# Patient Record
Sex: FEMALE | Race: WHITE | NOT HISPANIC OR LATINO | URBAN - METROPOLITAN AREA
[De-identification: names, ages, dates, MRNs, and addresses within clinical notes are randomized per-mention and may not be internally consistent; named-entity substitution may affect disease eponyms.]

---

## 2024-09-04 ENCOUNTER — INPATIENT (INPATIENT)
Facility: HOSPITAL | Age: 79
LOS: 13 days | Discharge: HOME | DRG: 511 | End: 2024-09-18
Attending: OBSTETRICS & GYNECOLOGY
Payer: MEDICAID

## 2024-09-04 VITALS
TEMPERATURE: 97 F | SYSTOLIC BLOOD PRESSURE: 174 MMHG | RESPIRATION RATE: 16 BRPM | OXYGEN SATURATION: 99 % | DIASTOLIC BLOOD PRESSURE: 83 MMHG | HEART RATE: 68 BPM | WEIGHT: 138.89 LBS

## 2024-09-04 DIAGNOSIS — R09.89 OTHER SPECIFIED SYMPTOMS AND SIGNS INVOLVING THE CIRCULATORY AND RESPIRATORY SYSTEMS: ICD-10-CM

## 2024-09-04 DIAGNOSIS — R19.00 INTRA-ABDOMINAL AND PELVIC SWELLING, MASS AND LUMP, UNSPECIFIED SITE: ICD-10-CM

## 2024-09-04 LAB
ALBUMIN SERPL ELPH-MCNC: 3.3 G/DL — LOW (ref 3.5–5.2)
ALP SERPL-CCNC: 71 U/L — SIGNIFICANT CHANGE UP (ref 30–115)
ALT FLD-CCNC: 7 U/L — SIGNIFICANT CHANGE UP (ref 0–41)
ANION GAP SERPL CALC-SCNC: 12 MMOL/L — SIGNIFICANT CHANGE UP (ref 7–14)
ANISOCYTOSIS BLD QL: SLIGHT — SIGNIFICANT CHANGE UP
APTT BLD: 33.1 SEC — SIGNIFICANT CHANGE UP (ref 27–39.2)
AST SERPL-CCNC: 23 U/L — SIGNIFICANT CHANGE UP (ref 0–41)
BASOPHILS # BLD AUTO: 0 K/UL — SIGNIFICANT CHANGE UP (ref 0–0.2)
BASOPHILS NFR BLD AUTO: 0 % — SIGNIFICANT CHANGE UP (ref 0–1)
BILIRUB SERPL-MCNC: 0.4 MG/DL — SIGNIFICANT CHANGE UP (ref 0.2–1.2)
BLD GP AB SCN SERPL QL: SIGNIFICANT CHANGE UP
BUN SERPL-MCNC: 15 MG/DL — SIGNIFICANT CHANGE UP (ref 10–20)
CALCIUM SERPL-MCNC: 8.8 MG/DL — SIGNIFICANT CHANGE UP (ref 8.4–10.5)
CHLORIDE SERPL-SCNC: 92 MMOL/L — LOW (ref 98–110)
CO2 SERPL-SCNC: 26 MMOL/L — SIGNIFICANT CHANGE UP (ref 17–32)
CREAT SERPL-MCNC: 1.1 MG/DL — SIGNIFICANT CHANGE UP (ref 0.7–1.5)
EGFR: 52 ML/MIN/1.73M2 — LOW
EOSINOPHIL # BLD AUTO: 0 K/UL — SIGNIFICANT CHANGE UP (ref 0–0.7)
EOSINOPHIL NFR BLD AUTO: 0 % — SIGNIFICANT CHANGE UP (ref 0–8)
GLUCOSE SERPL-MCNC: 127 MG/DL — HIGH (ref 70–99)
HCT VFR BLD CALC: 31.2 % — LOW (ref 37–47)
HGB BLD-MCNC: 10.2 G/DL — LOW (ref 12–16)
INR BLD: 1.13 RATIO — SIGNIFICANT CHANGE UP (ref 0.65–1.3)
LYMPHOCYTES # BLD AUTO: 0.09 K/UL — LOW (ref 1.2–3.4)
LYMPHOCYTES # BLD AUTO: 1.9 % — LOW (ref 20.5–51.1)
MANUAL SMEAR VERIFICATION: SIGNIFICANT CHANGE UP
MCHC RBC-ENTMCNC: 28.1 PG — SIGNIFICANT CHANGE UP (ref 27–31)
MCHC RBC-ENTMCNC: 32.7 G/DL — SIGNIFICANT CHANGE UP (ref 32–37)
MCV RBC AUTO: 86 FL — SIGNIFICANT CHANGE UP (ref 81–99)
MONOCYTES # BLD AUTO: 0.31 K/UL — SIGNIFICANT CHANGE UP (ref 0.1–0.6)
MONOCYTES NFR BLD AUTO: 6.8 % — SIGNIFICANT CHANGE UP (ref 1.7–9.3)
MYELOCYTES NFR BLD: 1 % — HIGH (ref 0–0)
NEUTROPHILS # BLD AUTO: 4.13 K/UL — SIGNIFICANT CHANGE UP (ref 1.4–6.5)
NEUTROPHILS NFR BLD AUTO: 90.3 % — HIGH (ref 42.2–75.2)
OVALOCYTES BLD QL SMEAR: SLIGHT — SIGNIFICANT CHANGE UP
PLAT MORPH BLD: NORMAL — SIGNIFICANT CHANGE UP
PLATELET # BLD AUTO: 241 K/UL — SIGNIFICANT CHANGE UP (ref 130–400)
PMV BLD: 10.4 FL — SIGNIFICANT CHANGE UP (ref 7.4–10.4)
POIKILOCYTOSIS BLD QL AUTO: SLIGHT — SIGNIFICANT CHANGE UP
POLYCHROMASIA BLD QL SMEAR: SIGNIFICANT CHANGE UP
POTASSIUM SERPL-MCNC: 5.2 MMOL/L — HIGH (ref 3.5–5)
POTASSIUM SERPL-SCNC: 5.2 MMOL/L — HIGH (ref 3.5–5)
PROT SERPL-MCNC: 6.5 G/DL — SIGNIFICANT CHANGE UP (ref 6–8)
PROTHROM AB SERPL-ACNC: 12.9 SEC — HIGH (ref 9.95–12.87)
RBC # BLD: 3.63 M/UL — LOW (ref 4.2–5.4)
RBC # FLD: 17.8 % — HIGH (ref 11.5–14.5)
RBC BLD AUTO: ABNORMAL
SMUDGE CELLS # BLD: PRESENT — SIGNIFICANT CHANGE UP
SODIUM SERPL-SCNC: 130 MMOL/L — LOW (ref 135–146)
SPHEROCYTES BLD QL SMEAR: SLIGHT — SIGNIFICANT CHANGE UP
WBC # BLD: 4.57 K/UL — LOW (ref 4.8–10.8)
WBC # FLD AUTO: 4.57 K/UL — LOW (ref 4.8–10.8)

## 2024-09-04 PROCEDURE — 82670 ASSAY OF TOTAL ESTRADIOL: CPT

## 2024-09-04 PROCEDURE — P9047: CPT

## 2024-09-04 PROCEDURE — C1889: CPT

## 2024-09-04 PROCEDURE — P9016: CPT

## 2024-09-04 PROCEDURE — 74018 RADEX ABDOMEN 1 VIEW: CPT

## 2024-09-04 PROCEDURE — 85379 FIBRIN DEGRADATION QUANT: CPT

## 2024-09-04 PROCEDURE — 97110 THERAPEUTIC EXERCISES: CPT | Mod: GP

## 2024-09-04 PROCEDURE — 85014 HEMATOCRIT: CPT

## 2024-09-04 PROCEDURE — 80053 COMPREHEN METABOLIC PANEL: CPT

## 2024-09-04 PROCEDURE — 83735 ASSAY OF MAGNESIUM: CPT

## 2024-09-04 PROCEDURE — 82746 ASSAY OF FOLIC ACID SERUM: CPT

## 2024-09-04 PROCEDURE — 86901 BLOOD TYPING SEROLOGIC RH(D): CPT

## 2024-09-04 PROCEDURE — 80048 BASIC METABOLIC PNL TOTAL CA: CPT

## 2024-09-04 PROCEDURE — 82330 ASSAY OF CALCIUM: CPT

## 2024-09-04 PROCEDURE — 82728 ASSAY OF FERRITIN: CPT

## 2024-09-04 PROCEDURE — 85018 HEMOGLOBIN: CPT

## 2024-09-04 PROCEDURE — 94003 VENT MGMT INPAT SUBQ DAY: CPT

## 2024-09-04 PROCEDURE — 94760 N-INVAS EAR/PLS OXIMETRY 1: CPT

## 2024-09-04 PROCEDURE — 93970 EXTREMITY STUDY: CPT

## 2024-09-04 PROCEDURE — 83036 HEMOGLOBIN GLYCOSYLATED A1C: CPT

## 2024-09-04 PROCEDURE — 76830 TRANSVAGINAL US NON-OB: CPT

## 2024-09-04 PROCEDURE — 85025 COMPLETE CBC W/AUTO DIFF WBC: CPT

## 2024-09-04 PROCEDURE — 88360 TUMOR IMMUNOHISTOCHEM/MANUAL: CPT

## 2024-09-04 PROCEDURE — 84295 ASSAY OF SERUM SODIUM: CPT

## 2024-09-04 PROCEDURE — 86336 INHIBIN A: CPT

## 2024-09-04 PROCEDURE — 85730 THROMBOPLASTIN TIME PARTIAL: CPT

## 2024-09-04 PROCEDURE — 97162 PT EVAL MOD COMPLEX 30 MIN: CPT | Mod: GP

## 2024-09-04 PROCEDURE — 97116 GAIT TRAINING THERAPY: CPT | Mod: GP

## 2024-09-04 PROCEDURE — C1769: CPT

## 2024-09-04 PROCEDURE — 83615 LACTATE (LD) (LDH) ENZYME: CPT

## 2024-09-04 PROCEDURE — 83540 ASSAY OF IRON: CPT

## 2024-09-04 PROCEDURE — 36415 COLL VENOUS BLD VENIPUNCTURE: CPT

## 2024-09-04 PROCEDURE — 71045 X-RAY EXAM CHEST 1 VIEW: CPT

## 2024-09-04 PROCEDURE — 86923 COMPATIBILITY TEST ELECTRIC: CPT

## 2024-09-04 PROCEDURE — 83010 ASSAY OF HAPTOGLOBIN QUANT: CPT

## 2024-09-04 PROCEDURE — 86301 IMMUNOASSAY TUMOR CA 19-9: CPT

## 2024-09-04 PROCEDURE — 74177 CT ABD & PELVIS W/CONTRAST: CPT | Mod: 26,MC

## 2024-09-04 PROCEDURE — P9045: CPT

## 2024-09-04 PROCEDURE — 83605 ASSAY OF LACTIC ACID: CPT

## 2024-09-04 PROCEDURE — 93005 ELECTROCARDIOGRAM TRACING: CPT

## 2024-09-04 PROCEDURE — 36430 TRANSFUSION BLD/BLD COMPNT: CPT

## 2024-09-04 PROCEDURE — 83520 IMMUNOASSAY QUANT NOS NONAB: CPT

## 2024-09-04 PROCEDURE — 83550 IRON BINDING TEST: CPT

## 2024-09-04 PROCEDURE — 85045 AUTOMATED RETICULOCYTE COUNT: CPT

## 2024-09-04 PROCEDURE — 82607 VITAMIN B-12: CPT

## 2024-09-04 PROCEDURE — C1880: CPT

## 2024-09-04 PROCEDURE — 84132 ASSAY OF SERUM POTASSIUM: CPT

## 2024-09-04 PROCEDURE — 82962 GLUCOSE BLOOD TEST: CPT

## 2024-09-04 PROCEDURE — 94002 VENT MGMT INPAT INIT DAY: CPT

## 2024-09-04 PROCEDURE — 82378 CARCINOEMBRYONIC ANTIGEN: CPT

## 2024-09-04 PROCEDURE — 37191 INS ENDOVAS VENA CAVA FILTR: CPT

## 2024-09-04 PROCEDURE — 93306 TTE W/DOPPLER COMPLETE: CPT

## 2024-09-04 PROCEDURE — 88305 TISSUE EXAM BY PATHOLOGIST: CPT

## 2024-09-04 PROCEDURE — 86304 IMMUNOASSAY TUMOR CA 125: CPT

## 2024-09-04 PROCEDURE — 86900 BLOOD TYPING SEROLOGIC ABO: CPT

## 2024-09-04 PROCEDURE — 88341 IMHCHEM/IMCYTCHM EA ADD ANTB: CPT

## 2024-09-04 PROCEDURE — 99285 EMERGENCY DEPT VISIT HI MDM: CPT

## 2024-09-04 PROCEDURE — 88307 TISSUE EXAM BY PATHOLOGIST: CPT

## 2024-09-04 PROCEDURE — 82803 BLOOD GASES ANY COMBINATION: CPT

## 2024-09-04 PROCEDURE — 71260 CT THORAX DX C+: CPT | Mod: 26,MC

## 2024-09-04 PROCEDURE — C1751: CPT

## 2024-09-04 PROCEDURE — 97530 THERAPEUTIC ACTIVITIES: CPT | Mod: GP

## 2024-09-04 PROCEDURE — 85027 COMPLETE CBC AUTOMATED: CPT

## 2024-09-04 PROCEDURE — 85610 PROTHROMBIN TIME: CPT

## 2024-09-04 PROCEDURE — 88304 TISSUE EXAM BY PATHOLOGIST: CPT

## 2024-09-04 PROCEDURE — 88342 IMHCHEM/IMCYTCHM 1ST ANTB: CPT

## 2024-09-04 PROCEDURE — 71045 X-RAY EXAM CHEST 1 VIEW: CPT | Mod: 26

## 2024-09-04 PROCEDURE — 86850 RBC ANTIBODY SCREEN: CPT

## 2024-09-04 PROCEDURE — 84100 ASSAY OF PHOSPHORUS: CPT

## 2024-09-04 PROCEDURE — 93970 EXTREMITY STUDY: CPT | Mod: 26

## 2024-09-04 RX ORDER — HEPARIN SODIUM,BOVINE 1000/ML
5000 VIAL (ML) INJECTION EVERY 6 HOURS
Refills: 0 | Status: DISCONTINUED | OUTPATIENT
Start: 2024-09-04 | End: 2024-09-11

## 2024-09-04 RX ORDER — SENNA 187 MG
2 TABLET ORAL AT BEDTIME
Refills: 0 | Status: DISCONTINUED | OUTPATIENT
Start: 2024-09-04 | End: 2024-09-11

## 2024-09-04 RX ORDER — HEPARIN SODIUM,BOVINE 1000/ML
2500 VIAL (ML) INJECTION EVERY 6 HOURS
Refills: 0 | Status: DISCONTINUED | OUTPATIENT
Start: 2024-09-04 | End: 2024-09-11

## 2024-09-04 RX ORDER — OXYCODONE AND ACETAMINOPHEN 7.5; 325 MG/1; MG/1
1 TABLET ORAL EVERY 6 HOURS
Refills: 0 | Status: DISCONTINUED | OUTPATIENT
Start: 2024-09-04 | End: 2024-09-11

## 2024-09-04 RX ORDER — ACETAMINOPHEN 325 MG/1
650 TABLET ORAL EVERY 8 HOURS
Refills: 0 | Status: DISCONTINUED | OUTPATIENT
Start: 2024-09-04 | End: 2024-09-12

## 2024-09-04 RX ORDER — POLYETHYLENE GLYCOL 3350 17 G/17G
17 POWDER, FOR SOLUTION ORAL
Refills: 0 | Status: DISCONTINUED | OUTPATIENT
Start: 2024-09-04 | End: 2024-09-11

## 2024-09-04 RX ORDER — AMLODIPINE BESYLATE 10 MG/1
5 TABLET ORAL ONCE
Refills: 0 | Status: COMPLETED | OUTPATIENT
Start: 2024-09-04 | End: 2024-09-04

## 2024-09-04 RX ORDER — IOHEXOL 350 MG/ML
30 INJECTION, SOLUTION INTRAVENOUS ONCE
Refills: 0 | Status: COMPLETED | OUTPATIENT
Start: 2024-09-04 | End: 2024-09-04

## 2024-09-04 RX ORDER — HEPARIN SODIUM,BOVINE 1000/ML
VIAL (ML) INJECTION
Qty: 25000 | Refills: 0 | Status: DISCONTINUED | OUTPATIENT
Start: 2024-09-04 | End: 2024-09-10

## 2024-09-04 RX ORDER — HEPARIN SODIUM,BOVINE 1000/ML
5000 VIAL (ML) INJECTION ONCE
Refills: 0 | Status: COMPLETED | OUTPATIENT
Start: 2024-09-04 | End: 2024-09-04

## 2024-09-04 RX ADMIN — AMLODIPINE BESYLATE 5 MILLIGRAM(S): 10 TABLET ORAL at 22:59

## 2024-09-04 RX ADMIN — ACETAMINOPHEN 650 MILLIGRAM(S): 325 TABLET ORAL at 22:59

## 2024-09-04 RX ADMIN — ACETAMINOPHEN 650 MILLIGRAM(S): 325 TABLET ORAL at 23:45

## 2024-09-04 RX ADMIN — Medication 1100 UNIT(S)/HR: at 22:58

## 2024-09-04 RX ADMIN — IOHEXOL 30 MILLILITER(S): 350 INJECTION, SOLUTION INTRAVENOUS at 12:32

## 2024-09-04 RX ADMIN — Medication 2 TABLET(S): at 22:59

## 2024-09-04 RX ADMIN — Medication 5000 UNIT(S): at 22:50

## 2024-09-04 NOTE — H&P ADULT - NSHPPHYSICALEXAM_GEN_ALL_CORE
CONST: Well appearing in NAD  	EYES: PERRL, EOMI, Sclera and conjunctiva clear.   	ENT: Oropharynx normal appearing, no erythema or exudates. Uvula midline.  	CARD: Normal S1 S2; Normal rate and rhythm  	RESP: Equal BS B/L, No wheezes, rhonchi or rales. No distress  	GI: Soft, non-tender, moderately protuberant abdomen.   	MS: Normal ROM in all extremities. No midline spinal tenderness. FROM R hip passive, pain elicited upon R SLR. Mild swelling bilateral lower extremities 1+  	SKIN: Warm, dry, no acute rashes. Good turgor  NEURO: A&Ox3, No focal deficits. Strength 5/5 with no sensory deficits. CONST: lying comfortably   CARD: Normal S1 S2; Normal rate and rhythm  RESP: clear b/l   GI:distented hard with a central surgical scar faizan   MS: No swelling   NEURO: A&Ox3, No focal deficits. Strength 5/5 with no sensory deficits.

## 2024-09-04 NOTE — ED PROVIDER NOTE - FAMILY DETAILS FREE TEXT FOR MDM ADDL HISTORY OBTAINED FROM QUESTION
Chief Complaint   Patient presents with     Follow Up For     Return CORE; 74 year old with chronic diastolic heart failure and recent hospitalization presents for follow-up with labs prior.      Medications reviewed and vitals performed.  Ai Norton CMA    -son at bedside.

## 2024-09-04 NOTE — ED PROVIDER NOTE - CLINICAL SUMMARY MEDICAL DECISION MAKING FREE TEXT BOX
Patient signed out to me from Dr. Ellis -79-year-old female recently diagnosed with metastatic cancer in [facility], unable to get chemo there.  Sent to the ER from [facility]D, Dr. Jefferson for further workup and admission for likely biopsy.  Labs/CT reviewed.  Prelim LE duplex: R DVT in peroneal and gastrocnemius veins, L DVT gastrocnemius, posterior tibial, peroneal veins.  MAR aware, vascular to see patient, anticoagulation to be ordered by inpatient team.

## 2024-09-04 NOTE — H&P ADULT - ASSESSMENT
79-year-old female with past medical history of prediabetes presents for right groin pain with radiation down anterior leg and constipation x 2 days.  Patient has arrived  from [facility] 1 week ago and has been staying with family who have noticed decreased appetite and complaints of right groin pain worsening when walking and decreased bowel movements despite utilizing prune juice and increasing fluids. Tylenol occasionally improves pain.   Patient was seen by her primary Dr Jefferson  last night and directed to the emergency department.  Patient's daughter Queta notes patient underwent a recent surgery to her right colon and [facility], unsure what was done or what was removed but is concerned that her constipation and new pain could be related to the surgery   Denies any  fevers chills nausea vomiting diarrhea no numbness/weakness      #bilateral DVTs :   -As per Ed the prelim Duplex BLE report showed  DVTs from gastroc/popliteal/peroneal.   Not including femoral  PLAN   -f/u duplex BLE official read   -f/u vascular c/s  -started on heparin full AC       #METASTATIC CANCER :   #Large multiloculated cystic mass with areas of soft tissue:  #Constipation   #Dec Apetite   -In Ed systolic  ,afebrile on RA    -neutrophil % 90.3 ,potassium 5.2 hemo lysed   - Dr Jefferson, reviewed records from [facility], pt was found to have metastatic cancer,   possibly colon as primary. Was informed needed to start chemo, however there was no chemo available there at the time.  -On  CT A/P :   Large multiloculated cystic mass with areas of soft tissue, measuring   24.7 x 12.5 x 22.3 cm, extending from the pelvis into the upper abdomen.    Questionable filling defects in both the right and left common femoral   vein and deep venous thrombosis is not excluded.      Indeterminate multiple fluid-filled/cystic lesions within the abdomen   measuring up to 6.5 cm, felt to likely be related to cystic metastatic   disease    Soft tissue density within the transverse colon adjacent to the splenic   flexure on image 52 series 2. Findings may be related to focal stool.       Subcutaneous nodule measuring 1.8 cm and subcentimeter sclerotic focus in   T12, metastatic disease is not excluded  PLAN :   -pain control  -EKG for QTc   -Zofran PRN for vomiting   - Calorie count   - Bowel regimen   -monitor bowel movement   - f/u labs in am   - can follow up Oncologist outpt with plan of chemo   -f/u PT eval     #HYPONATREMIA :   -most likely due to dec PO intake   -Na 130  PLAN   -monitor BMP for sodium         #Mild Hydronephrosis   -creat 1.1 bsl unknown , eGFR 52 %   -seen on CTA/P   PLAN   -f/u BMP to monitor Cr   -bladder scan for rentention   -can consider urology c/s if condition worsen     #Small Pleural effusion  -seen on CT A/P  -CXray no significant finding   -on RA   PLAN   -f/u repeat Cxray in am     #AORTIC ANEURYSM  4.5 cm   -systolic blood pressure was 170 in Ed   -monitor Blood pressure , c/w home meds    #Prediabetics    MISC:  GI ppx: not indicated   DVT ppx: on heparin   Diet: DASH   Activity:ATT   code status : full             79-year-old female with past medical history of prediabetes presents for right groin pain with radiation down anterior leg and constipation x 2 days.  Patient has arrived  from [facility] 1 week ago and has been staying with family who have noticed decreased appetite and complaints of right groin pain worsening when walking and decreased bowel movements despite utilizing prune juice and increasing fluids. Tylenol occasionally improves pain.   Patient was seen by her primary Dr Jefferson  last night and directed to the emergency department.  Patient's daughter Queta notes patient underwent a recent surgery to her right colon and [facility], unsure what was done or what was removed but is concerned that her constipation and new pain could be related to the surgery   Denies any  fevers chills nausea vomiting diarrhea no numbness/weakness      #bilateral DVTs :   -As per Ed the prelim Duplex BLE report showed  DVTs from gastroc/popliteal/peroneal.   Not including femoral  PLAN   -f/u duplex BLE official read   -f/u vascular c/s  -started on heparin full AC       #METASTATIC CANCER :   #Large multiloculated cystic mass with areas of soft tissue:  #Constipation   #Dec Apetite   -In Ed systolic  ,afebrile on RA    -neutrophil % 90.3 ,potassium 5.2 hemo lysed   - Dr Jefferson, reviewed records from [facility], pt was found to have metastatic cancer,   possibly colon as primary. Was informed needed to start chemo, however there was no chemo available there at the time.  -On  CT A/P :   Large multiloculated cystic mass with areas of soft tissue, measuring   24.7 x 12.5 x 22.3 cm, extending from the pelvis into the upper abdomen.    Questionable filling defects in both the right and left common femoral   vein and deep venous thrombosis is not excluded.      Indeterminate multiple fluid-filled/cystic lesions within the abdomen   measuring up to 6.5 cm, felt to likely be related to cystic metastatic   disease    Soft tissue density within the transverse colon adjacent to the splenic   flexure on image 52 series 2. Findings may be related to focal stool.       Subcutaneous nodule measuring 1.8 cm and subcentimeter sclerotic focus in   T12, metastatic disease is not excluded  PLAN :   -pain control  -EKG for QTc   -Zofran PRN for vomiting   - Calorie count   - Bowel regimen   -monitor bowel movement   - f/u labs in am   - can follow up Oncologist outpt with plan of chemo   -f/u PT eval     #HYPONATREMIA :   -most likely due to dec PO intake   -Na 130  PLAN   -monitor BMP for sodium         #Mild Hydronephrosis   -creat 1.1 bsl unknown , eGFR 52 %   -seen on CTA/P   PLAN   -f/u BMP to monitor Cr   -bladder scan for rentention   -can consider urology c/s if condition worsen     #Small Pleural effusion  -seen on CT A/P  -CXray no significant finding   -on RA   PLAN   -f/u repeat Cxray in am     #AORTIC ANEURYSM  4.5 cm   -systolic blood pressure was 170 in Ed   -monitor Blood pressure , c/w home meds after confirming med rec    #Prediabetics    MISC:  GI ppx: not indicated   DVT ppx: on heparin   Diet: DASH   Activity:ATT       called family twice no one picked , please try calling in am to confirm the med rec   pt doesn't knw the list            79-year-old female with past medical history of prediabetes presents for right groin pain with radiation down anterior leg and constipation x 2 days.  Patient has arrived  from [facility] [place] 1 week ago and has been staying with family who have noticed decreased appetite and complaints of right groin pain worsening when walking and decreased bowel movements despite utilizing prune juice and increasing fluids. Tylenol occasionally improves pain.   Patient was seen by her primary Dr Jefferson  last night and directed to the emergency department.  Patient's daughter Queta notes patient underwent a recent surgery to her right colon and [facility] [place], unsure what was done or what was removed but is concerned that her constipation and new pain could be related to the surgery   Denies any  fevers chills nausea vomiting diarrhea no numbness/weakness      #bilateral DVTs :   -As per Ed the prelim Duplex BLE report showed  DVTs from gastroc/popliteal/peroneal.   Not including femoral  PLAN   -f/u duplex BLE official read   -f/u vascular c/s  -started on heparin full AC   -monitor CBC for Hb   -active TnS   -target Hb >7 , transfuse if les than 7       #METASTATIC CANCER :   #Large multiloculated cystic mass with areas of soft tissue:  #Constipation   #Dec Apetite   -In Ed systolic  ,afebrile on RA    -neutrophil % 90.3 ,potassium 5.2 hemo lysed   - Dr Jefferson, reviewed records from [facility] [place], pt was found to have metastatic cancer,   possibly colon as primary. Was informed needed to start chemo, however there was no chemo available there at the time.  -On  CT A/P :   Large multiloculated cystic mass with areas of soft tissue, measuring   24.7 x 12.5 x 22.3 cm, extending from the pelvis into the upper abdomen.    Questionable filling defects in both the right and left common femoral   vein and deep venous thrombosis is not excluded.      Indeterminate multiple fluid-filled/cystic lesions within the abdomen   measuring up to 6.5 cm, felt to likely be related to cystic metastatic   disease    Soft tissue density within the transverse colon adjacent to the splenic   flexure on image 52 series 2. Findings may be related to focal stool.       Subcutaneous nodule measuring 1.8 cm and subcentimeter sclerotic focus in   T12, metastatic disease is not excluded  PLAN :   -pain control  -EKG for QTc   -Zofran PRN for vomiting   - Calorie count   - Bowel regimen   -monitor bowel movement   - f/u labs in am   - can follow up Oncologist outpt with plan of chemo   -f/u PT eval     #HYPONATREMIA :   -most likely due to dec PO intake   -Na 130  PLAN   -monitor BMP for sodium         #Mild Hydronephrosis   -creat 1.1 bsl unknown , eGFR 52 %   -seen on CTA/P   PLAN   -f/u BMP to monitor Cr   -bladder scan for rentention   -can consider urology c/s if condition worsen     #Small Pleural effusion  -seen on CT A/P  -CXray no significant finding   -on RA   PLAN   -f/u repeat Cxray in am     #AORTIC ANEURYSM  4.5 cm   -systolic blood pressure was 170 in Ed   -monitor Blood pressure , c/w home meds after confirming med rec    #Prediabetics    MISC:  GI ppx: not indicated   DVT ppx: on heparin   Diet: DASH   Activity:ATT       called family twice no one picked , please try calling in am to confirm the med rec   pt doesn't knw the list

## 2024-09-04 NOTE — ED PROVIDER NOTE - ATTENDING APP SHARED VISIT CONTRIBUTION OF CARE
79-year-old female with past medical history of prediabetes  pt presents for eval of R groin pain and constipation. pt recently arrived from [facility] 1 wk ago. family notices decreased in appetite and BMs. pt tolerating PO, no nausea/vomiting.  no fevers/chills/urinary complaints. pt saw pcp who advised coming to ED for workup.  no numbness/weakness    vss  gen- NAD, aaox3  card-rrr  lungs-ctab, no wheezing or rhonchi  abd-sntnd, no guarding or rebound  neuro- full str/sensation, cn ii-xii grossly intact, normal coordination and gait

## 2024-09-04 NOTE — ED PROVIDER NOTE - PROGRESS NOTE DETAILS
Discussed with Dr Jefferson, reviewed records from [facility], pt was found to have metastatic cancer, possibly colon as primary. Was informed needed to start chemo, however there was no chemo available there at the time. Pt arrived from [facility] 1 week ago. Concern for possible obstruction. CO- pt endorsed to Dr. Davis- f/u, ct, reassess, dispo Discussed with Dr Remy, large abdominal mass seemingly arising from gyn pathology but cannot rule out colonic pathology. Unsure if compressive on femoral veins, recommends duplex. Ordered lower extremity duplex and endorsed to admitting team for follow up.     Discussed with Dr Jefferson, admit to his service, likely will need biopsy and colonoscopy. Discussed mass with son at bedside. d/w vascular, bilateral DVTs from gastroc/popliteal/peroneal. Not including femoral veins. Discussed this result with admitting resident Dr Ga. d/w vascular, bilateral DVTs from gastroc/popliteal/peroneal. Not including femoral veins Discussed this result with admitting resident Dr Ga, +/- pending anticoagulation/vascular consult.

## 2024-09-04 NOTE — H&P ADULT - HISTORY OF PRESENT ILLNESS
79-year-old female with past medical history of prediabetes presents for right groin pain with radiation down anterior leg and constipation x 2 days.  Patient has arrived  from [facility] 1 week ago and has been staying with family who have noticed decreased appetite and complaints of right groin pain worsening when walking and decreased bowel movements despite utilizing prune juice and increasing fluids. Tylenol occasionally improves pain.   Patient was seen by her primary Dr Jefferson  last night and directed to the emergency department.  Patient's daughter Queta notes patient underwent a recent surgery to her right colon and [facility], unsure what was done or what was removed but is concerned that her constipation and new pain could be related to the surgery   Denies any  fevers chills nausea vomiting diarrhea no numbness/weakness    off note:As per primary Dr Jefferson, reviewed records from [facility], pt was found to have metastatic cancer, possibly colon as primary. Was informed needed to start chemo, however there was no chemo available there at the time.      In Ed on vitals   On labs Hb 10.2 , neutrophill % 90.3 , Na 130 , potassium 5.2   On imaging       rcvd     admitted for further workup  79-year-old female with past medical history of prediabetes presents for right groin pain with radiation down anterior leg and constipation x 2 days.  Patient has arrived  from [facility] 1 week ago and has been staying with family who have noticed decreased appetite and complaints of right groin pain worsening when walking and decreased bowel movements despite utilizing prune juice and increasing fluids. Tylenol occasionally improves pain.   Patient was seen by her primary Dr Jefferson  last night and directed to the emergency department.  Patient's daughter Queta notes patient underwent a recent surgery to her right colon and [facility], unsure what was done or what was removed but is concerned that her constipation and new pain could be related to the surgery   Denies any  fevers chills nausea vomiting diarrhea no numbness/weakness    off note:As per primary Dr Jefferson, reviewed records from [facility], pt was found to have metastatic cancer, possibly colon as primary. Was informed needed to start chemo, however there was no chemo available there at the time.      In Ed on vitals   · BP Systolic	 174 mm Hg  · BP Diastolic	83 mm Hg  · Heart Rate	68 /min  afebrile on RA     On labs Hb 10.2 , neutrophil % 90.3 , Na 130 , potassium 5.2 hemolysed   On imaging  On  CT A/P :   Large multiloculated cystic mass with areas of soft tissue, measuring   24.7 x 12.5 x 22.3 cm, extending from the pelvis into the upper abdomen.    Findings are concerning for malignancy; felt to likely be gynecologic in   etiology, but this mass remains indeterminate.    Questionable filling defects in both the right and left common femoral   vein and deep venous thrombosis is not excluded.    Further evaluation with ultrasound is recommended.    Mild bilateral hydronephrosis    Indeterminate multiple fluid-filled/cystic lesions within the abdomen   measuring up to 6.5 cm, felt to likely be related to cystic metastatic   disease    Soft tissue density within the transverse colon adjacent to the splenic   flexure on image 52 series 2. Findings may be related to focal stool.   Underlying neoplasm cannot be excluded.    Subcutaneous nodule measuring 1.8 cm and subcentimeter sclerotic focus in   T12, metastatic disease is not excluded    Small right pleural effusion.    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    As per Ed the prelim Duplex BLE report showed  DVTs from gastroc/popliteal/peroneal. Not including femoral        admitted for further workup  79-year-old female with past medical history of prediabetes presents for right groin pain with radiation down anterior leg and constipation x 2 days.  Patient has arrived  from [facility] 1 week ago and has been staying with family who have noticed decreased appetite and complaints of right groin pain worsening when walking and decreased bowel movements despite utilizing prune juice and increasing fluids. Tylenol occasionally improves pain.   Patient was seen by her primary Dr Jefferson  last night and directed to the emergency department.  Patient's daughter Queta notes patient underwent a recent surgery to her right colon and [facility], unsure what was done or what was removed but is concerned that her constipation and new pain could be related to the surgery   Denies any  fevers chills nausea vomiting diarrhea no numbness/weakness    off note:As per primary Dr Jefferson, reviewed records from [facility], pt was found to have metastatic cancer, possibly colon as primary. Was informed needed to start chemo, however there was no chemo available there at the time.      In Ed on vitals   · BP Systolic	 174 mm Hg  · BP Diastolic	83 mm Hg  · Heart Rate	68 /min  afebrile on RA     On labs Hb 10.2 , neutrophil % 90.3 , Na 130 , potassium 5.2 hemolysed   On imaging  On  CT A/P :   Large multiloculated cystic mass with areas of soft tissue, measuring   24.7 x 12.5 x 22.3 cm, extending from the pelvis into the upper abdomen.    Findings are concerning for malignancy; felt to likely be gynecologic in   etiology, but this mass remains indeterminate.    Questionable filling defects in both the right and left common femoral   vein and deep venous thrombosis is not excluded.      Mild bilateral hydronephrosis    Indeterminate multiple fluid-filled/cystic lesions within the abdomen   measuring up to 6.5 cm, felt to likely be related to cystic metastatic   disease    Soft tissue density within the transverse colon adjacent to the splenic   flexure on image 52 series 2. Findings may be related to focal stool.   Underlying neoplasm cannot be excluded.    Subcutaneous nodule measuring 1.8 cm and subcentimeter sclerotic focus in   T12, metastatic disease is not excluded    Small right pleural effusion.    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    As per Ed the prelim Duplex BLE report showed  DVTs from gastroc/popliteal/peroneal. Not including femoral        admitted for further workup

## 2024-09-04 NOTE — ED ADULT NURSE NOTE - NSFALLUNIVINTERV_ED_ALL_ED
Bed/Stretcher in lowest position, wheels locked, appropriate side rails in place/Call bell, personal items and telephone in reach/Instruct patient to call for assistance before getting out of bed/chair/stretcher/Non-slip footwear applied when patient is off stretcher/[place] to call system/Physically safe environment - no spills, clutter or unnecessary equipment/Purposeful proactive rounding/Room/bathroom lighting operational, light cord in reach

## 2024-09-04 NOTE — ED PROVIDER NOTE - CONSIDERATION OF ADMISSION OBSERVATION
Consideration of Admission/Observation Patient requires admission to hospital for work of abdominal mass.

## 2024-09-04 NOTE — ED PROVIDER NOTE - PHYSICAL EXAMINATION
CONST: Well appearing in NAD  EYES: PERRL, EOMI, Sclera and conjunctiva clear.   ENT: Oropharynx normal appearing, no erythema or exudates. Uvula midline.  CARD: Normal S1 S2; Normal rate and rhythm  RESP: Equal BS B/L, No wheezes, rhonchi or rales. No distress  GI: Soft, non-tender, moderately protuberant abdomen.   MS: Normal ROM in all extremities. No midline spinal tenderness. FROM R hip passive, pain elicited upon R SLR. Mild swelling bilateral lower extremities 1+  SKIN: Warm, dry, no acute rashes. Good turgor  NEURO: A&Ox3, No focal deficits. Strength 5/5 with no sensory deficits.

## 2024-09-04 NOTE — ED ADULT NURSE NOTE - OBJECTIVE STATEMENT
pt is a 79 yr old M c/o abdominal pain x 3 days as per daughter. Pt stated she has problems having a bowel movement. Pt denies any N/V/d or fevers.

## 2024-09-04 NOTE — ED PROVIDER NOTE - OBJECTIVE STATEMENT
79-year-old female with past medical history of prediabetes presents for right groin pain with radiation down anterior leg and constipation x 2 days.  Patient has arrived  from [facility] 1 week ago and has been staying with family who have noticed decreased appetite and complaints of right groin pain worsening when walking and decreased bowel movements despite utilizing prune juice and increasing fluids. Tylenol occasionally improves pain.   Patient was seen by her primary doctor last night and directed to the emergency department.  Patient's daughter Queta notes patient underwent a recent surgery to her right colon and [facility], unsure what was done or what was removed but is concerned that her constipation and new pain could be related to the surgery patient has been without fevers chills nausea vomiting diarrhea 79-year-old female with past medical history of prediabetes presents for right groin pain with radiation down anterior leg and constipation x 2 days.  Patient has arrived  from [facility] 1 week ago and has been staying with family who have noticed decreased appetite and complaints of right groin pain worsening when walking and decreased bowel movements despite utilizing prune juice and increasing fluids. Tylenol occasionally improves pain.   Patient was seen by her primary doctor last night and directed to the emergency department.  Patient's daughter-in-law notes patient underwent a recent surgery to her right colon and [facility], unsure what was done or what was removed but is concerned that her constipation and new pain could be related to the surgery patient has been without fevers chills nausea vomiting diarrhea

## 2024-09-04 NOTE — ED PROVIDER NOTE - HIV OFFER
Quick Intake Entered On:  2/15/2022 2:35 PM CST    Performed On:  2/15/2022 2:35 PM CST by Dexter Silva MD               Summary   Advance Directive :   Yes   Menstrual Status :   N/A   Height/Length Estimated :   67 in(Converted to: 5 ft 7 in, 170.18 cm)    Systolic Blood Pressure :   128 mmHg   Diastolic Blood Pressure :   60 mmHg   Mean Arterial Pressure :   83 mmHg   Peripheral Pulse Rate :   82 bpm   Dexter Silva MD - 2/15/2022 2:35 PM CST   Yes, get tested

## 2024-09-05 LAB
ALBUMIN SERPL ELPH-MCNC: 3 G/DL — LOW (ref 3.5–5.2)
ALP SERPL-CCNC: 55 U/L — SIGNIFICANT CHANGE UP (ref 30–115)
ALT FLD-CCNC: <5 U/L — SIGNIFICANT CHANGE UP (ref 0–41)
ANION GAP SERPL CALC-SCNC: 8 MMOL/L — SIGNIFICANT CHANGE UP (ref 7–14)
APTT BLD: 130.6 SEC — CRITICAL HIGH (ref 27–39.2)
APTT BLD: 51.5 SEC — HIGH (ref 27–39.2)
APTT BLD: 90.6 SEC — CRITICAL HIGH (ref 27–39.2)
AST SERPL-CCNC: 11 U/L — SIGNIFICANT CHANGE UP (ref 0–41)
BASOPHILS # BLD AUTO: 0 K/UL — SIGNIFICANT CHANGE UP (ref 0–0.2)
BASOPHILS NFR BLD AUTO: 0 % — SIGNIFICANT CHANGE UP (ref 0–1)
BILIRUB SERPL-MCNC: 0.4 MG/DL — SIGNIFICANT CHANGE UP (ref 0.2–1.2)
BUN SERPL-MCNC: 15 MG/DL — SIGNIFICANT CHANGE UP (ref 10–20)
CALCIUM SERPL-MCNC: 8.3 MG/DL — LOW (ref 8.4–10.5)
CHLORIDE SERPL-SCNC: 96 MMOL/L — LOW (ref 98–110)
CO2 SERPL-SCNC: 26 MMOL/L — SIGNIFICANT CHANGE UP (ref 17–32)
CREAT SERPL-MCNC: 1 MG/DL — SIGNIFICANT CHANGE UP (ref 0.7–1.5)
D DIMER BLD IA.RAPID-MCNC: 1392 NG/ML DDU — HIGH
EGFR: 58 ML/MIN/1.73M2 — LOW
EOSINOPHIL # BLD AUTO: 0 K/UL — SIGNIFICANT CHANGE UP (ref 0–0.7)
EOSINOPHIL NFR BLD AUTO: 0 % — SIGNIFICANT CHANGE UP (ref 0–8)
GLUCOSE BLDC GLUCOMTR-MCNC: 140 MG/DL — HIGH (ref 70–99)
GLUCOSE BLDC GLUCOMTR-MCNC: 217 MG/DL — HIGH (ref 70–99)
GLUCOSE SERPL-MCNC: 103 MG/DL — HIGH (ref 70–99)
HCT VFR BLD CALC: 27.2 % — LOW (ref 37–47)
HGB BLD-MCNC: 9.1 G/DL — LOW (ref 12–16)
IMM GRANULOCYTES NFR BLD AUTO: 0.5 % — HIGH (ref 0.1–0.3)
LYMPHOCYTES # BLD AUTO: 0.97 K/UL — LOW (ref 1.2–3.4)
LYMPHOCYTES # BLD AUTO: 24.9 % — SIGNIFICANT CHANGE UP (ref 20.5–51.1)
MAGNESIUM SERPL-MCNC: 1.9 MG/DL — SIGNIFICANT CHANGE UP (ref 1.8–2.4)
MCHC RBC-ENTMCNC: 27.9 PG — SIGNIFICANT CHANGE UP (ref 27–31)
MCHC RBC-ENTMCNC: 33.5 G/DL — SIGNIFICANT CHANGE UP (ref 32–37)
MCV RBC AUTO: 83.4 FL — SIGNIFICANT CHANGE UP (ref 81–99)
MONOCYTES # BLD AUTO: 0.5 K/UL — SIGNIFICANT CHANGE UP (ref 0.1–0.6)
MONOCYTES NFR BLD AUTO: 12.8 % — HIGH (ref 1.7–9.3)
NEUTROPHILS # BLD AUTO: 2.41 K/UL — SIGNIFICANT CHANGE UP (ref 1.4–6.5)
NEUTROPHILS NFR BLD AUTO: 61.8 % — SIGNIFICANT CHANGE UP (ref 42.2–75.2)
NRBC # BLD: 0 /100 WBCS — SIGNIFICANT CHANGE UP (ref 0–0)
PLATELET # BLD AUTO: 209 K/UL — SIGNIFICANT CHANGE UP (ref 130–400)
PMV BLD: 9.3 FL — SIGNIFICANT CHANGE UP (ref 7.4–10.4)
POTASSIUM SERPL-MCNC: 4.2 MMOL/L — SIGNIFICANT CHANGE UP (ref 3.5–5)
POTASSIUM SERPL-SCNC: 4.2 MMOL/L — SIGNIFICANT CHANGE UP (ref 3.5–5)
PROT SERPL-MCNC: 5.3 G/DL — LOW (ref 6–8)
RBC # BLD: 3.26 M/UL — LOW (ref 4.2–5.4)
RBC # FLD: 17.2 % — HIGH (ref 11.5–14.5)
SODIUM SERPL-SCNC: 130 MMOL/L — LOW (ref 135–146)
WBC # BLD: 3.9 K/UL — LOW (ref 4.8–10.8)
WBC # FLD AUTO: 3.9 K/UL — LOW (ref 4.8–10.8)

## 2024-09-05 PROCEDURE — 99223 1ST HOSP IP/OBS HIGH 75: CPT

## 2024-09-05 PROCEDURE — 71045 X-RAY EXAM CHEST 1 VIEW: CPT | Mod: 26

## 2024-09-05 PROCEDURE — 93010 ELECTROCARDIOGRAM REPORT: CPT

## 2024-09-05 PROCEDURE — 99253 IP/OBS CNSLTJ NEW/EST LOW 45: CPT

## 2024-09-05 PROCEDURE — 99448 NTRPROF PH1/NTRNET/EHR 21-30: CPT

## 2024-09-05 RX ORDER — DEXTROSE 15 G/33 G
12.5 GEL IN PACKET (GRAM) ORAL ONCE
Refills: 0 | Status: DISCONTINUED | OUTPATIENT
Start: 2024-09-05 | End: 2024-09-12

## 2024-09-05 RX ORDER — DEXTROSE 15 G/33 G
25 GEL IN PACKET (GRAM) ORAL ONCE
Refills: 0 | Status: DISCONTINUED | OUTPATIENT
Start: 2024-09-05 | End: 2024-09-12

## 2024-09-05 RX ORDER — GLUCAGON INJECTION, SOLUTION 1 MG/.2ML
1 INJECTION, SOLUTION SUBCUTANEOUS ONCE
Refills: 0 | Status: DISCONTINUED | OUTPATIENT
Start: 2024-09-05 | End: 2024-09-12

## 2024-09-05 RX ORDER — DEXTROSE 15 G/33 G
15 GEL IN PACKET (GRAM) ORAL ONCE
Refills: 0 | Status: DISCONTINUED | OUTPATIENT
Start: 2024-09-05 | End: 2024-09-12

## 2024-09-05 RX ORDER — METOPROLOL TARTRATE 100 MG/1
50 TABLET ORAL DAILY
Refills: 0 | Status: DISCONTINUED | OUTPATIENT
Start: 2024-09-05 | End: 2024-09-12

## 2024-09-05 RX ADMIN — ACETAMINOPHEN 650 MILLIGRAM(S): 325 TABLET ORAL at 13:51

## 2024-09-05 RX ADMIN — Medication 1100 UNIT(S)/HR: at 06:38

## 2024-09-05 RX ADMIN — ACETAMINOPHEN 650 MILLIGRAM(S): 325 TABLET ORAL at 23:24

## 2024-09-05 RX ADMIN — ACETAMINOPHEN 650 MILLIGRAM(S): 325 TABLET ORAL at 22:24

## 2024-09-05 RX ADMIN — POLYETHYLENE GLYCOL 3350 17 GRAM(S): 17 POWDER, FOR SOLUTION ORAL at 18:24

## 2024-09-05 RX ADMIN — ACETAMINOPHEN 650 MILLIGRAM(S): 325 TABLET ORAL at 05:13

## 2024-09-05 RX ADMIN — Medication 1000 UNIT(S)/HR: at 23:00

## 2024-09-05 RX ADMIN — ACETAMINOPHEN 650 MILLIGRAM(S): 325 TABLET ORAL at 13:01

## 2024-09-05 RX ADMIN — Medication 2: at 18:21

## 2024-09-05 RX ADMIN — Medication 1200 UNIT(S)/HR: at 13:35

## 2024-09-05 RX ADMIN — Medication 1200 UNIT(S)/HR: at 20:47

## 2024-09-05 RX ADMIN — POLYETHYLENE GLYCOL 3350 17 GRAM(S): 17 POWDER, FOR SOLUTION ORAL at 05:13

## 2024-09-05 RX ADMIN — Medication 2 TABLET(S): at 22:24

## 2024-09-05 RX ADMIN — Medication 0 UNIT(S)/HR: at 21:54

## 2024-09-05 RX ADMIN — Medication 20 MILLIGRAM(S): at 22:24

## 2024-09-05 RX ADMIN — Medication 2500 UNIT(S): at 13:40

## 2024-09-05 NOTE — CONSULT NOTE ADULT - ASSESSMENT
79-year-old female with past medical history of prediabetes presents for right groin pain with radiation down anterior leg and constipation x 2 days.  Patient has arrived  from [facility] 1 week ago and has been staying with family who have noticed decreased appetite and complaints of right groin pain worsening when walking and decreased bowel movements despite utilizing prune juice and increasing fluids. Tylenol occasionally improves pain.   Patient was seen by her primary Dr Jefferson  last night and directed to the emergency department.  Patient's daughter Queta notes patient underwent a recent surgery to her right colon in [facility], unsure what was done or what was removed but is concerned that her constipation and new pain could be related to the surgery     Vascular surgery called after venous dplx showed bilateral gastroc, peroneal and left PTV DVTs.   No edema on exam  Also noted CT scan findings for splenic artery aneurysm measuring 1.6 cm    Plan:   - I reviewed today's labs  - I reviewed and personally visualized all the radiology imagings  - I discussed the plan with attending Dr. Casey:  - AC for at least 3 months (discharge on Eliquis 10n mg po BID x 7 days then 5 mg po BID)  - follow up in the office with Dr. Casey in 1 months for DVTs and splenic artery aneurysm    Any questions, call [phone]

## 2024-09-05 NOTE — PROGRESS NOTE ADULT - ATTENDING COMMENTS
case familiar to me  pt had an X lap in [facility], NO resection of anything was done.  per pt son in [facility] "they opened  her up and closed w/o doing anything at all"  there are no diagnostic reports of any kind from there so pt needs a biopsy which likely is GYN primary tumor.  will d/c GYN team in am.

## 2024-09-05 NOTE — CHART NOTE - NSCHARTNOTEFT_GEN_A_CORE
Calorie Count initiated from 9/5 - 9/7, hanging in patient's room. RN and PCA aware. RD covering unit to collect and post results on 9/8.     RD: Ebony Kendall x [phone] or via TEAMS

## 2024-09-05 NOTE — PHYSICAL THERAPY INITIAL EVALUATION ADULT - GENERAL OBSERVATIONS, REHAB EVAL
10:04-10:29am Pt encountered supine in bed, A & O x 3 in NAD, +IV/hep drip, Indonesian speaking(ID# [id number]), no c/o pain and agreeable to PT. Pt required Mod A in bed mobility and Min A sit/stand transfer. Pt is non-amb at this time secondary to weakness, decreased balance and +IV cont. Pt left in bed as found after PT session in [facility], RN aware. Pt will benefit from skilled PT 3-5x/wk for thera ex, functional mobility training, balance act and gait training to improve mobility status to facilitate return to previous level of function.

## 2024-09-05 NOTE — CONSULT NOTE ADULT - SUBJECTIVE AND OBJECTIVE BOX
Limited history due to patient being hard of hearing, unable to hear Maori , patient asking resident to come back when English speaking sister-in-law is at bedside, EC number incorrect. History by chart review.     Chief Complaint: Groin pain    HPI: 79y year old s/p right colon resection surgery in [facility], likely due to metastatic cancer, possibly primarily from colon. Patient came back from [facility] last week and presented with constipation, groin pain radiating towards the leg, found to have a large multiloculated cystic measuring 24.7 x 12.5 x 22.3 cm, extending from the pelvis into the upper abdomen. Patient unable to verbalize when menopause was but denies current vaginal bleeding. Per chart review, patient has denies diarrhea, constipation, N/V as well.       Ob/Gyn History: Unable to asses at this time      Allergies  No Known Allergies    PAST MEDICAL & SURGICAL HISTORY: Unable to assess at this time       FAMILY HISTORY:Unable to assess at this time       SOCIAL HISTORY: Unable to assess at this time     Vital Signs Last 24 Hrs  T(F): 98.1 (05 Sep 2024 13:38), Max: 98.1 (05 Sep 2024 13:38)  HR: 61 (05 Sep 2024 13:38) (54 - 90)  BP: 133/76 (05 Sep 2024 13:38) (130/74 - 161/74)  RR: 18 (05 Sep 2024 13:38) (18 - 18)    Unable to perform physical exam, however on general appearance, patient is sitting up, eating a meal, in no acute distress.     Meds:   (ADM OVERRIDE) 1 each &lt;see task&gt; GiveOnce  amLODIPine   Tablet 5 milliGRAM(s) Oral once  heparin   Injectable 5000 Unit(s) IV Push once  iohexol 300 mG (iodine)/mL Oral Solution 30 milliLiter(s) Oral once        LABS:                        9.1    3.90  )-----------( 209      ( 05 Sep 2024 04:35 )             27.2       ABO RH Interpretation: A POS (09-05-24 @ 04:35)  Antibody Screen: NEG (09-05-24 @ 04:35)    09-05    130<L>  |  96<L>  |  15  ----------------------------<  103<H>  4.2   |  26  |  1.0    Ca    8.3<L>      05 Sep 2024 04:35  Mg     1.9     09-05    TPro  5.3<L>  /  Alb  3.0<L>  /  TBili  0.4  /  DBili  x   /  AST  11  /  ALT  <5  /  AlkPhos  55  09-05    PT/INR - ( 04 Sep 2024 12:59 )   PT: 12.90 sec;   INR: 1.13 ratio         PTT - ( 05 Sep 2024 12:20 )  PTT:51.5 sec  Urinalysis Basic - ( 05 Sep 2024 04:35 )    Color: x / Appearance: x / SG: x / pH: x  Gluc: 103 mg/dL / Ketone: x  / Bili: x / Urobili: x   Blood: x / Protein: x / Nitrite: x   Leuk Esterase: x / RBC: x / WBC x   Sq Epi: x / Non Sq Epi: x / Bacteria: x          RADIOLOGY & ADDITIONAL STUDIES:  INTERPRETATION: CLINICAL STATEMENT: History of metastatic cancer, rule out small bowel obstruction    TECHNIQUE: Contiguous axial CT images were obtained from the thoracic inlet to the pubic symphysis . 100 cc administered of IV contrast documented in unlinked concurrent exam (accession [id number]), Omnipaque 350 (accession [id number]) (0 cc discarded). Oral contrast was given. Reformatted images in the coronal and sagittal planes, as well as MIP reconstructed images, were acquired.      COMPARISON CT: None.      FINDINGS:    CHEST:    LUNGS/PLEURA/AIRWAYS: The central tracheobronchial tree is patent. There is a small right pleural effusion with atelectasis..    MEDIASTINUM/THORACIC NODES: The thyroid gland is present. There is no evidence of axillary, mediastinal or hilar lymphadenopathy..    HEART/GREAT VESSELS: There are vascular calcifications. The ascending aorta is aneurysmal measuring 4.5 cm. There is no pericardial effusion..      ABDOMEN/PELVIS:    HEPATOBILIARY: Unremarkable...    SPLEEN: There is a calcified splenic artery aneurysm adjacent to the left adrenal gland measuring 1.6 cm.    PANCREAS: Atrophy.    ADRENAL GLANDS: Unremarkable..    KIDNEYS: There is mild bilateral hydroureteronephrosis..    ABDOMINOPELVIC NODES: Unremarkable...    PELVIC ORGANS: There is a large multiloculated cystic mass with areas of soft tissue, measuring 24.7 x 12.5 x 22.3 cm, extending into the upper abdomen. Findings are concerning for malignancy; felt to likely be gynecologic in etiology, but this mass remains indeterminate. Within the upper abdomen and left upper quadrant, there are multiple fluid-filled/cystic lesions measuring up to 6.5 cm, felt to likely be related to cystic metastatic disease    PERITONEUM/MESENTERY/BOWEL: There is no free air or obstruction. There is an area of soft tissue density within the transverse colon adjacent to the splenic flexure on image 52 series 2. Findings may be related to focal stool. However, underlying neoplasm cannot be excluded..    BONES/SOFT TISSUES: Within the subcutaneous tissues of the anterior left pelvic wall, there is a 1.8 x 1.0 cm nodule in image 109 of series 2 and a metastatic focus is not excluded. Degenerative change. There is a T12 sclerotic focus measuring 0.8 cm, possibly representing a bone island. A blastic metastasis is not excluded...    OTHER: Vascular calcifications. There are questionable filling defects in both the right and left common femoral vein and deep venous thrombosis is not excluded. Further evaluation with ultrasound is recommended...      IMPRESSION:    Large multiloculated cystic mass with areas of soft tissue, measuring 24.7 x 12.5 x 22.3 cm, extending from the pelvis into the upper abdomen.    Findings are concerning for malignancy; felt to likely be gynecologic in etiology, but this mass remains indeterminate.    Questionable filling defects in both the right and left common femoral vein and deep venous thrombosis is not excluded.    Further evaluation with ultrasound is recommended.    Mild bilateral hydronephrosis    Indeterminate multiple fluid-filled/cystic lesions within the abdomen measuring up to 6.5 cm, felt to likely be related to cystic metastatic disease    Soft tissue density within the transverse colon adjacent to the splenic flexure on image 52 series 2. Findings may be related to focal stool. Underlying neoplasm cannot be excluded.    Subcutaneous nodule measuring 1.8 cm and subcentimeter sclerotic focus in T12, metastatic disease is not excluded    Small right pleural effusion.    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    Findings discussed with PRAMOD Nunes on 9/4/2024 at 5:59 PM with read back Limited history due to patient being hard of hearing, unable to hear Frisian , patient asking resident to come back when English speaking sister-in-law is at bedside, EC number incorrect. History by chart review.     Chief Complaint: Groin pain    HPI: 79y year old s/p right colon resection surgery in [facility], likely due to metastatic cancer, possibly primarily from colon. Patient came back from [facility] last week and presented with constipation, groin pain radiating towards the leg, found to have a large multiloculated cystic measuring 24.7 x 12.5 x 22.3 cm, extending from the pelvis into the upper abdomen. Patient unable to verbalize when menopause was but denies current vaginal bleeding. Per chart review, patient has denies diarrhea, constipation, N/V as well.     Ob/Gyn History: Unable to asses at this time    Allergies  No Known Allergies    PAST MEDICAL & SURGICAL HISTORY: Unable to assess at this time     FAMILY HISTORY: Unable to assess at this time     SOCIAL HISTORY: Unable to assess at this time     Vital Signs Last 24 Hrs  T(F): 98.1 (05 Sep 2024 13:38), Max: 98.1 (05 Sep 2024 13:38)  HR: 61 (05 Sep 2024 13:38) (54 - 90)  BP: 133/76 (05 Sep 2024 13:38) (130/74 - 161/74)  RR: 18 (05 Sep 2024 13:38) (18 - 18)    Unable to perform physical exam, however on general appearance, patient is sitting up, eating a meal, in no acute distress.     Meds:   (ADM OVERRIDE) 1 each &lt;see task&gt; GiveOnce  amLODIPine   Tablet 5 milliGRAM(s) Oral once  heparin   Injectable 5000 Unit(s) IV Push once  iohexol 300 mG (iodine)/mL Oral Solution 30 milliLiter(s) Oral once    LABS:                        9.1    3.90  )-----------( 209      ( 05 Sep 2024 04:35 )             27.2       ABO RH Interpretation: A POS (09-05-24 @ 04:35)  Antibody Screen: NEG (09-05-24 @ 04:35)    09-05    130<L>  |  96<L>  |  15  ----------------------------<  103<H>  4.2   |  26  |  1.0    Ca    8.3<L>      05 Sep 2024 04:35  Mg     1.9     09-05    TPro  5.3<L>  /  Alb  3.0<L>  /  TBili  0.4  /  DBili  x   /  AST  11  /  ALT  <5  /  AlkPhos  55  09-05    PT/INR - ( 04 Sep 2024 12:59 )   PT: 12.90 sec;   INR: 1.13 ratio         PTT - ( 05 Sep 2024 12:20 )  PTT:51.5 sec  Urinalysis Basic - ( 05 Sep 2024 04:35 )    Color: x / Appearance: x / SG: x / pH: x  Gluc: 103 mg/dL / Ketone: x  / Bili: x / Urobili: x   Blood: x / Protein: x / Nitrite: x   Leuk Esterase: x / RBC: x / WBC x   Sq Epi: x / Non Sq Epi: x / Bacteria: x    RADIOLOGY & ADDITIONAL STUDIES:  INTERPRETATION: CLINICAL STATEMENT: History of metastatic cancer, rule out small bowel obstruction    TECHNIQUE: Contiguous axial CT images were obtained from the thoracic inlet to the pubic symphysis . 100 cc administered of IV contrast documented in unlinked concurrent exam (accession [id number]), Omnipaque 350 (accession [id number]) (0 cc discarded). Oral contrast was given. Reformatted images in the coronal and sagittal planes, as well as MIP reconstructed images, were acquired.    COMPARISON CT: None.    FINDINGS:    CHEST:    LUNGS/PLEURA/AIRWAYS: The central tracheobronchial tree is patent. There is a small right pleural effusion with atelectasis..    MEDIASTINUM/THORACIC NODES: The thyroid gland is present. There is no evidence of axillary, mediastinal or hilar lymphadenopathy..    HEART/GREAT VESSELS: There are vascular calcifications. The ascending aorta is aneurysmal measuring 4.5 cm. There is no pericardial effusion..      ABDOMEN/PELVIS:    HEPATOBILIARY: Unremarkable...    SPLEEN: There is a calcified splenic artery aneurysm adjacent to the left adrenal gland measuring 1.6 cm.    PANCREAS: Atrophy.    ADRENAL GLANDS: Unremarkable..    KIDNEYS: There is mild bilateral hydroureteronephrosis..    ABDOMINOPELVIC NODES: Unremarkable...    PELVIC ORGANS: There is a large multiloculated cystic mass with areas of soft tissue, measuring 24.7 x 12.5 x 22.3 cm, extending into the upper abdomen. Findings are concerning for malignancy; felt to likely be gynecologic in etiology, but this mass remains indeterminate. Within the upper abdomen and left upper quadrant, there are multiple fluid-filled/cystic lesions measuring up to 6.5 cm, felt to likely be related to cystic metastatic disease    PERITONEUM/MESENTERY/BOWEL: There is no free air or obstruction. There is an area of soft tissue density within the transverse colon adjacent to the splenic flexure on image 52 series 2. Findings may be related to focal stool. However, underlying neoplasm cannot be excluded..    BONES/SOFT TISSUES: Within the subcutaneous tissues of the anterior left pelvic wall, there is a 1.8 x 1.0 cm nodule in image 109 of series 2 and a metastatic focus is not excluded. Degenerative change. There is a T12 sclerotic focus measuring 0.8 cm, possibly representing a bone island. A blastic metastasis is not excluded...    OTHER: Vascular calcifications. There are questionable filling defects in both the right and left common femoral vein and deep venous thrombosis is not excluded. Further evaluation with ultrasound is recommended...    IMPRESSION:    Large multiloculated cystic mass with areas of soft tissue, measuring 24.7 x 12.5 x 22.3 cm, extending from the pelvis into the upper abdomen.    Findings are concerning for malignancy; felt to likely be gynecologic in etiology, but this mass remains indeterminate.    Questionable filling defects in both the right and left common femoral vein and deep venous thrombosis is not excluded.    Further evaluation with ultrasound is recommended.    Mild bilateral hydronephrosis    Indeterminate multiple fluid-filled/cystic lesions within the abdomen measuring up to 6.5 cm, felt to likely be related to cystic metastatic disease    Soft tissue density within the transverse colon adjacent to the splenic flexure on image 52 series 2. Findings may be related to focal stool. Underlying neoplasm cannot be excluded.    Subcutaneous nodule measuring 1.8 cm and subcentimeter sclerotic focus in T12, metastatic disease is not excluded    Small right pleural effusion.    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    Findings discussed with PRAMOD Nunes on 9/4/2024 at 5:59 PM with read back

## 2024-09-05 NOTE — PHYSICAL THERAPY INITIAL EVALUATION ADULT - PERTINENT HX OF CURRENT PROBLEM, REHAB EVAL
79-year-old female with past medical history of prediabetes presents for right groin pain with radiation down anterior leg and constipation x 2 days.  Patient has arrived  from [facility] 1 week ago and has been staying with family who have noticed decreased appetite and complaints of right groin pain worsening when walking and decreased bowel movements despite utilizing prune juice and increasing fluids. Tylenol occasionally improves pain.   Patient was seen by her primary Dr Jefferson  last night and directed to the emergency department.  Patient's daughter Queta notes patient underwent a recent surgery to her right colon and [facility], unsure what was done or what was removed but is concerned that her constipation and new pain could be related to the surgery. Denies any  fevers chills nausea vomiting diarrhea no numbness/weakness.

## 2024-09-05 NOTE — POST DISCHARGE NOTE - NOTIFICATION:
Notified Dr. Rubén Jefferson of patient's CT findings.  Please contact [email or web address] or [phone] or select “Cancer Care Direct” on the discharge disposition sheet when the patient is close to discharge for assistance in outpatient care.

## 2024-09-05 NOTE — PROGRESS NOTE ADULT - ASSESSMENT
79-year-old female with past medical history of prediabetes presents for right groin pain with radiation down anterior leg and constipation x 2 days.  Patient has arrived  from [facility] 1 week ago and has been staying with family who have noticed decreased appetite and complaints of right groin pain worsening when walking and decreased bowel movements despite utilizing prune juice and increasing fluids. Tylenol occasionally improves pain. Patient's daughter Queta notes patient underwent a recent surgery to her right colon in [facility], unsure what was done or what was removed but is concerned that her constipation and new pain could be related to the surgery. CTAP showed large multiloculated cystic mass extending from the pelvis into the abdomen as well as multiple fluid-filled cystic lesions within the abdomen concerning for malignancy with metastasis. Patient admitted for further work up as well as treatment for bilateral LE DVT.      #bilateral DVTs  VA Duplex LE bilateral: Right lower extremity DVT in peroneal vein and gastrocnemius veins. Left lower extremity DVT in gastrocnemius, posterior tibial, and peroneal veins.  Patient is high risk given likely malignancy.  -started on heparin full AC  -monitor CBC for Hb   -active T&S  -target Hb >7 , transfuse if less than 7       #METASTATIC CANCER :   #Large multiloculated cystic mass with areas of soft tissue:  #Constipation   #Decreased Appetite  -In ED vitals:  ,afebrile on RA; labs notable for neutrophil 90.3%, potassium 5.2 hemolyzed   CTAP : Large multiloculated cystic mass with areas of soft tissue, measuring 24.7 x 12.5 x 22.3 cm, extending from the pelvis into the upper abdomen. Questionable filling defects in both the right and left common femoral vein and deep venous thrombosis is not excluded. Indeterminate multiple fluid-filled/cystic lesions within the abdomen measuring up to 6.5 cm, felt to likely be related to cystic metastatic disease. Soft tissue density within the transverse colon adjacent to the splenic flexure on image 52 series 2. Findings may be related to focal stool. Subcutaneous nodule measuring 1.8 cm and subcentimeter sclerotic focus in T12, metastatic disease is not excluded  - Dr Jefferson, reviewed records from [facility], pt was found to have metastatic cancer, possibly colon as primary. Was informed needed to start chemo, however there was no chemo available there at the time.  -pain control  -EKG for QTc, f/u   -Zofran PRN for vomiting   - Calorie count   - Bowel regimen   -monitor bowel movement   - f/u labs in am   - can follow up Oncologist outpt with plan of chemo   -f/u PT eval     #HYPONATREMIA :   -most likely due to dec PO intake   -Na 130  PLAN   -monitor BMP for sodium         #Mild Hydronephrosis   -creat 1.1 bsl unknown , eGFR 52 %   -seen on CTA/P   PLAN   -f/u BMP to monitor Cr   -bladder scan for rentention   -can consider urology c/s if condition worsen     #Small Pleural effusion  -seen on CT A/P  -CXray no significant finding   -on RA   PLAN   -f/u repeat Cxray in am     #AORTIC ANEURYSM  4.5 cm   -systolic blood pressure was 170 in Ed   -monitor Blood pressure , c/w home meds after confirming med rec    #Prediabetics    MISC:  GI ppx: not indicated   DVT ppx: on heparin   Diet: DASH   Activity:ATT   79-year-old female with past medical history of prediabetes presents for right groin pain with radiation down anterior leg and constipation x 2 days.  Patient has arrived  from [facility] 1 week ago and has been staying with family who have noticed decreased appetite and complaints of right groin pain worsening when walking and decreased bowel movements despite utilizing prune juice and increasing fluids. Tylenol occasionally improves pain. Patient's daughter Queta notes patient underwent a recent surgery to her right colon in [facility], unsure what was done or what was removed but is concerned that her constipation and new pain could be related to the surgery. CTAP showed large multiloculated cystic mass extending from the pelvis into the abdomen as well as multiple fluid-filled cystic lesions within the abdomen concerning for malignancy with metastasis. Patient admitted for further work up as well as treatment for bilateral LE DVT.    #bilateral DVTs  VA Duplex LE bilateral: Right lower extremity DVT in peroneal vein and gastrocnemius veins. Left lower extremity DVT in gastrocnemius, posterior tibial, and peroneal veins.  Patient is high risk given likely malignancy.  - c/w heparin full AC  - daily cbc  - transfuse for Hgb < 7, keep active T&S    #METASTATIC CANCER :   #Large multiloculated cystic mass with areas of soft tissue:  #Constipation   #Decreased Appetite  - ED vitals notable for  ,afebrile, on RA; ED labs notable for neutrophil 90.3%, potassium 5.2 hemolyzed   CTAP : Large multiloculated cystic mass with areas of soft tissue, measuring 24.7 x 12.5 x 22.3 cm, extending from the pelvis into the upper abdomen. Questionable filling defects in both the right and left common femoral vein and deep venous thrombosis is not excluded. Indeterminate multiple fluid-filled/cystic lesions within the abdomen measuring up to 6.5 cm, felt to likely be related to cystic metastatic disease. Soft tissue density within the transverse colon adjacent to the splenic flexure on image 52 series 2. Findings may be related to focal stool. Subcutaneous nodule measuring 1.8 cm and subcentimeter sclerotic focus in T12, metastatic disease is not excluded  - Dr Jefferson, reviewed records from [facility], pt was found to have metastatic cancer, possibly colon as primary. Was informed needed to start chemo, however there was no chemo available there at the time.  -pain control  -EKG for QTc, f/u   -Zofran PRN for vomiting   - Calorie count   - Bowel regimen   - monitor bowel movement   - IR following  - GI following  - f/u recs from Gyn Onc  -f/u PT eval     #Hyponatremia  - most likely due to decreased PO intake   - Na 130  - daily CMP  - calorie count, encourage po intake    #Mild Hydronephrosis  - seen on CTA/P    - creat 1.1 unknown baseline   -bladder scan for rentention   -can consider urology c/s if condition worsen     #Small Pleural effusion - resolved  - seen on CT A/P  - on RA   - CXR this AM no cardiopulmonary disease  - monitor fluid balance and pulse ox    #AORTIC ANEURYSM  4.5 cm   -systolic blood pressure was 170 in Ed   -monitor Blood pressure , c/w home meds after confirming med rec    #Prediabetics    MISC:  DVT ppx: heparin drip  Diet: DASH   Activity: ATT   79-year-old female with past medical history of prediabetes presents for right groin pain with radiation down anterior leg and constipation x 2 days.  Patient has arrived  from [facility] 1 week ago and has been staying with family who have noticed decreased appetite and complaints of right groin pain worsening when walking and decreased bowel movements despite utilizing prune juice and increasing fluids. Tylenol occasionally improves pain. Patient's daughter Queta notes patient underwent a recent surgery to her right colon in [facility], unsure what was done or what was removed but is concerned that her constipation and new pain could be related to the surgery. CTAP showed large multiloculated cystic mass extending from the pelvis into the abdomen as well as multiple fluid-filled cystic lesions within the abdomen concerning for malignancy with metastasis. Patient admitted for further work up as well as treatment for bilateral LE DVT.    #bilateral DVTs  VA Duplex LE bilateral: Right lower extremity DVT in peroneal vein and gastrocnemius veins. Left lower extremity DVT in gastrocnemius, posterior tibial, and peroneal veins.  Patient is high risk given likely malignancy.  - c/w heparin full AC  - daily cbc  - transfuse for Hgb < 7, keep active T&S    #METASTATIC CANCER :   #Large multiloculated cystic mass with areas of soft tissue:  #Constipation   #Decreased Appetite  - ED vitals notable for  ,afebrile, on RA; ED labs notable for neutrophil 90.3%, potassium 5.2 hemolyzed   CTAP : Large multiloculated cystic mass with areas of soft tissue, measuring 24.7 x 12.5 x 22.3 cm, extending from the pelvis into the upper abdomen. Questionable filling defects in both the right and left common femoral vein and deep venous thrombosis is not excluded. Indeterminate multiple fluid-filled/cystic lesions within the abdomen measuring up to 6.5 cm, felt to likely be related to cystic metastatic disease. Soft tissue density within the transverse colon adjacent to the splenic flexure on image 52 series 2. Findings may be related to focal stool. Subcutaneous nodule measuring 1.8 cm and subcentimeter sclerotic focus in T12, metastatic disease is not excluded  - Dr Jefferson, reviewed records from [facility], pt was found to have metastatic cancer, possibly colon as primary. Was informed needed to start chemo, however there was no chemo available there at the time.  -pain control  -EKG for QTc, f/u   -Zofran PRN for vomiting   - Calorie count   - Bowel regimen   - monitor bowel movement   - IR following  - GI following  - f/u recs from Gyn Onc  -f/u PT eval     #Hyponatremia  - most likely due to decreased PO intake   - Na 130  - daily CMP  - calorie count, encourage po intake    #Mild Hydronephrosis  - seen on CTA/P    - creat 1.0, unknown baseline   -bladder scan q6 for rentention   -can consider urology c/s if condition worsen     #Small Pleural effusion - resolved  - seen on CT A/P  - on RA   - CXR this AM no cardiopulmonary disease  - monitor fluid balance and pulse ox    #AORTIC ANEURYSM  4.5 cm   - systolic blood pressure was 170 in ED  - monitor Blood pressure, has been wnl   - c/w home meds after confirming med rec    #Prediabetic (?)  - blood glucose wnl      MISC:  DVT ppx: heparin drip  Diet: DASH   Activity: ATT   79-year-old female with past medical history of prediabetes presents for right groin pain with radiation down anterior leg and constipation x 2 days.  Patient has arrived  from [facility] 1 week ago and has been staying with family who have noticed decreased appetite and complaints of right groin pain worsening when walking and decreased bowel movements despite utilizing prune juice and increasing fluids. Tylenol occasionally improves pain. Patient's daughter Queta notes patient underwent a recent surgery to her right colon in [facility], unsure what was done or what was removed but is concerned that her constipation and new pain could be related to the surgery. CTAP showed large multiloculated cystic mass extending from the pelvis into the abdomen as well as multiple fluid-filled cystic lesions within the abdomen concerning for malignancy with metastasis. Patient admitted for further work up as well as treatment for bilateral LE DVT.    #bilateral DVTs  VA Duplex LE bilateral: Right lower extremity DVT in peroneal vein and gastrocnemius veins. Left lower extremity DVT in gastrocnemius, posterior tibial, and peroneal veins.  Patient is high risk given likely malignancy.  - c/w heparin full AC  - daily cbc  - transfuse for Hgb < 7, keep active T&S    #METASTATIC CANCER :   #Large multiloculated cystic mass with areas of soft tissue:  #Constipation   #Decreased Appetite  - ED vitals notable for  ,afebrile, on RA; ED labs notable for neutrophil 90.3%, potassium 5.2 hemolyzed   CTAP : Large multiloculated cystic mass with areas of soft tissue, measuring 24.7 x 12.5 x 22.3 cm, extending from the pelvis into the upper abdomen. Questionable filling defects in both the right and left common femoral vein and deep venous thrombosis is not excluded. Indeterminate multiple fluid-filled/cystic lesions within the abdomen measuring up to 6.5 cm, felt to likely be related to cystic metastatic disease. Soft tissue density within the transverse colon adjacent to the splenic flexure on image 52 series 2. Findings may be related to focal stool. Subcutaneous nodule measuring 1.8 cm and subcentimeter sclerotic focus in T12, metastatic disease is not excluded  - Dr Jefferson, reviewed records from [facility], pt was found to have metastatic cancer, possibly colon as primary. Was informed needed to start chemo, however there was no chemo available there at the time.  -pain control  -EKG for QTc, f/u   -Zofran PRN for vomiting   - Calorie count   - Bowel regimen   - monitor bowel movement   - IR following  - GI following  - f/u recs from Gyn Onc  -f/u PT eval     #Hyponatremia  - most likely due to decreased PO intake   - Na 130  - daily CMP  - calorie count, encourage po intake    #Mild Hydronephrosis  - seen on CTA/P    - creat 1.0, unknown baseline   -bladder scan q6 for rentention   -can consider urology c/s if condition worsen     #Small Pleural effusion - resolved  - seen on CT A/P  - on RA   - CXR this AM no cardiopulmonary disease  - monitor fluid balance and pulse ox    #AORTIC ANEURYSM  4.5 cm   - systolic blood pressure was 170 in ED  - monitor Blood pressure, has been wnl   - unable to confirm med rec  - will start with toprol xl 50mg daily  - atorvastatin 20mg daily    #Prediabetes  - blood glucose wnl  - ISS for now      MISC:  DVT ppx: heparin drip  Diet: DASH   Activity: ATT

## 2024-09-05 NOTE — CONSULT NOTE ADULT - ASSESSMENT
79y year old s/p right colon resection surgery, history per chart review, right groin pain, constipation, 22cm pelvic to upper abdomen mass, GI vs GYN etiology    - Recommend TVUS, tumor markers: , , inhibin, estradiol, CEA, and attempting to obtain medical records  - Consult heme-onc  - Assessment incomplete due to inability to perform physical exam, please reconsult when English speaking family member is at bedside  - Made nurse aware to call [phone] when family member is at bedside 79y year old s/p right colon resection surgery, history per chart review, right groin pain, constipation, 22cm pelvic to upper abdomen mass, GI vs GYN etiology    - Recommend TVUS  - Obtain tumor markers: , , inhibin a/b, estradiol, CEA  - Please try to obtain prior medical records from [facility]   - Consult heme-onc  - Assessment incomplete due to inability to perform physical exam or speak with patient. Patient unable to understand  and is also hard of hearing. Attempted to call emergency contact, but wrong number in the profile. Please reconsult when English speaking family member is at bedside  - Made nurse aware to call [phone] when family member is at bedside

## 2024-09-05 NOTE — CONSULT NOTE ADULT - SUBJECTIVE AND OBJECTIVE BOX
Gastroenterology Consultation:    Patient is a 79y old  Female who presents with a chief complaint of groin pain (05 Sep 2024 13:22)        Admitted on: 09-04-24      HPI:   79-year-old female with past medical history of prediabetes presents for right groin pain with radiation down anterior leg and constipation x 2 days.  Patient has arrived  from [facility] 1 week ago and has been staying with family who have noticed decreased appetite and complaints of right groin pain worsening when walking and decreased bowel movements despite utilizing prune juice and increasing fluids. Tylenol occasionally improves pain.   Patient was seen by her primary Dr Jefferson  last night and directed to the emergency department.  Patient's daughter Queta notes patient underwent a recent surgery to her right colon and [facility], unsure what was done or what was removed but is concerned that her constipation and new pain could be related to the surgery   Denies any  fevers chills nausea vomiting diarrhea no numbness/weakness    off note:As per primary Dr Jefferson, reviewed records from [facility], pt was found to have metastatic cancer, possibly colon as primary. Was informed needed to start chemo, however there was no chemo available there at the time.      In Ed on vitals   · BP Systolic	 174 mm Hg  · BP Diastolic	83 mm Hg  · Heart Rate	68 /min  afebrile on RA     On labs Hb 10.2 , neutrophil % 90.3 , Na 130 , potassium 5.2 hemolysed   On imaging  On  CT A/P :   Large multiloculated cystic mass with areas of soft tissue, measuring   24.7 x 12.5 x 22.3 cm, extending from the pelvis into the upper abdomen.    Findings are concerning for malignancy; felt to likely be gynecologic in   etiology, but this mass remains indeterminate.    Questionable filling defects in both the right and left common femoral   vein and deep venous thrombosis is not excluded.      Mild bilateral hydronephrosis    Indeterminate multiple fluid-filled/cystic lesions within the abdomen   measuring up to 6.5 cm, felt to likely be related to cystic metastatic   disease    Soft tissue density within the transverse colon adjacent to the splenic   flexure on image 52 series 2. Findings may be related to focal stool.   Underlying neoplasm cannot be excluded.    Subcutaneous nodule measuring 1.8 cm and subcentimeter sclerotic focus in   T12, metastatic disease is not excluded    Small right pleural effusion.    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    As per Ed the prelim Duplex BLE report showed  DVTs from gastroc/popliteal/peroneal. Not including femoral        admitted for further workup (04 Sep 2024 20:27)        Prior EGD: never    Prior Colonoscopy: never       PAST MEDICAL & SURGICAL HISTORY:        FAMILY HISTORY: not pertinent for GI malignancy       Social History:  Tobacco: denies   Alcohol: denies   Drugs: denies     Home Medications:        MEDICATIONS  (STANDING):  acetaminophen     Tablet .. 650 milliGRAM(s) Oral every 8 hours  atorvastatin 20 milliGRAM(s) Oral at bedtime  dextrose 5%. 1000 milliLiter(s) (50 mL/Hr) IV Continuous <Continuous>  dextrose 5%. 1000 milliLiter(s) (100 mL/Hr) IV Continuous <Continuous>  dextrose 50% Injectable 12.5 Gram(s) IV Push once  dextrose 50% Injectable 25 Gram(s) IV Push once  dextrose 50% Injectable 25 Gram(s) IV Push once  glucagon  Injectable 1 milliGRAM(s) IntraMuscular once  heparin  Infusion.  Unit(s)/Hr (11 mL/Hr) IV Continuous <Continuous>  insulin lispro (ADMELOG) corrective regimen sliding scale   SubCutaneous three times a day before meals  metoprolol succinate ER 50 milliGRAM(s) Oral daily  polyethylene glycol 3350 17 Gram(s) Oral two times a day  senna 2 Tablet(s) Oral at bedtime    MEDICATIONS  (PRN):  dextrose Oral Gel 15 Gram(s) Oral once PRN Blood Glucose LESS THAN 70 milliGRAM(s)/deciliter  heparin   Injectable 5000 Unit(s) IV Push every 6 hours PRN For aPTT less than 40  heparin   Injectable 2500 Unit(s) IV Push every 6 hours PRN For aPTT between 40 - 57  oxycodone    5 mG/acetaminophen 325 mG 1 Tablet(s) Oral every 6 hours PRN Severe Pain (7 - 10)      Allergies  No Known Allergies      Review of Systems:   Constitutional:  No Fever, No Chills, +ve weight loss   ENT/Mouth:  No Hearing Changes,  No Difficulty Swallowing  Eyes:  No Eye Pain, No Vision Changes  Cardiovascular:  No Chest Pain, No Palpitations  Respiratory:  No Cough, No Dyspnea  Gastrointestinal:  As described in HPI  Neuro:  No Syncope, No Dizziness  Heme/Lymph:  No Bruising, No Bleeding.          Physical Examination:  T(C): 36.7 (09-05-24 @ 13:38), Max: 36.7 (09-05-24 @ 13:38)  HR: 61 (09-05-24 @ 13:38) (54 - 90)  BP: 133/76 (09-05-24 @ 13:38) (130/74 - 182/84)  RR: 18 (09-05-24 @ 13:38) (17 - 18)  SpO2: 96% (09-04-24 @ 22:50) (96% - 99%)      09-04-24 @ 07:01  -  09-05-24 @ 07:00  --------------------------------------------------------  IN: 0 mL / OUT: 1 mL / NET: -1 mL          GENERAL: AAOx3, no acute distress.  HEAD:  Atraumatic, Normocephalic  EYES: conjunctiva and sclera clear  NECK: Supple, no JVD or thyromegaly  CHEST/LUNG: Clear to auscultation bilaterally; No wheeze, rhonchi, or rales  HEART: Regular rate and rhythm; normal S1, S2, No murmurs.  ABDOMEN: Soft, distended; Bowel sounds present  NEUROLOGY: No asterixis or tremor.           Data:                        9.1    3.90  )-----------( 209      ( 05 Sep 2024 04:35 )             27.2     Hgb Trend:  9.1  09-05-24 @ 04:35  10.2  09-04-24 @ 12:59        09-05    130<L>  |  96<L>  |  15  ----------------------------<  103<H>  4.2   |  26  |  1.0    Ca    8.3<L>      05 Sep 2024 04:35  Mg     1.9     09-05    TPro  5.3<L>  /  Alb  3.0<L>  /  TBili  0.4  /  DBili  x   /  AST  11  /  ALT  <5  /  AlkPhos  55  09-05    Liver panel trend:  TBili 0.4   /   AST 11   /   ALT <5   /   AlkP 55   /   Tptn 5.3   /   Alb 3.0    /   DBili --      09-05  TBili 0.4   /   AST 23   /   ALT 7   /   AlkP 71   /   Tptn 6.5   /   Alb 3.3    /   DBili --      09-04      PT/INR - ( 04 Sep 2024 12:59 )   PT: 12.90 sec;   INR: 1.13 ratio         PTT - ( 05 Sep 2024 12:20 )  PTT:51.5 sec        Radiology:  CT Abdomen and Pelvis w/ Oral Cont and w/ IV Cont:   ACC: [id number] EXAM:  CT CHEST IC   ORDERED BY: ANTON NUNES     ACC: [id number] EXAM:  CT ABDOMEN AND PELVIS OC IC   ORDERED BY: ANTON NUNES     PROCEDURE DATE:  09/04/2024          INTERPRETATION:  CLINICAL STATEMENT: History of metastatic cancer, rule   out small bowel obstruction    TECHNIQUE: Contiguous axial CT images were obtained from the thoracic   inlet to the pubic symphysis .  100 cc administered of IV contrast   documented in unlinked concurrent exam (accession [id number]), Omnipaque   350 (accession [id number]) (0 cc discarded).  Oral contrast was given.    Reformatted images in the coronal and sagittal planes, as well as MIP   reconstructed images, were acquired.      COMPARISON CT: None.      FINDINGS:    CHEST:    LUNGS/PLEURA/AIRWAYS: The central tracheobronchial tree is patent. There   is a small right pleural effusion with atelectasis..    MEDIASTINUM/THORACIC NODES: The thyroid gland is present. There is no   evidence of axillary, mediastinal or hilar lymphadenopathy..    HEART/GREAT VESSELS: There are vascular calcifications. The ascending   aorta is aneurysmal measuring 4.5 cm. There is no pericardial effusion..      ABDOMEN/PELVIS:    HEPATOBILIARY: Unremarkable...    SPLEEN: There is a calcified splenic artery aneurysm adjacent to the left   adrenal gland measuring 1.6 cm.    PANCREAS: Atrophy.    ADRENAL GLANDS: Unremarkable..    KIDNEYS: There is mild bilateral hydroureteronephrosis..    ABDOMINOPELVIC NODES: Unremarkable...    PELVIC ORGANS: There is a large multiloculated cystic mass with areas of   soft tissue, measuring 24.7 x 12.5 x 22.3 cm, extending into the upper   abdomen. Findings are concerning for malignancy; felt to likely be   gynecologic in etiology, but this mass remains indeterminate. Within the   upper abdomen and left upper quadrant, there are multiple   fluid-filled/cystic lesions measuring up to 6.5 cm, felt to likely be   related to cystic metastatic disease    PERITONEUM/MESENTERY/BOWEL: There is no free air or obstruction. There is   an area of soft tissue density within the transverse colon adjacent to   the splenic flexure on image 52 series 2. Findings may be related to   focal stool. However, underlying neoplasm cannot be excluded..    BONES/SOFT TISSUES: Within the subcutaneous tissues of the anterior left   pelvic wall, there is a 1.8 x 1.0 cm nodule in image 109 of series 2 and   a metastatic focus is not excluded. Degenerative change. There is a T12   sclerotic focus measuring 0.8 cm, possibly representing a bone island. A   blastic metastasis is not excluded...    OTHER: Vascular calcifications. There are questionable filling defects in   both the right and left common femoral vein and deep venous thrombosis is   not excluded. Further evaluation with ultrasound is recommended...      IMPRESSION:    Large multiloculated cystic mass with areas of soft tissue, measuring   24.7 x 12.5 x 22.3 cm, extending from the pelvis into the upper abdomen.    Findings are concerning for malignancy; felt to likely be gynecologic in   etiology, but this mass remains indeterminate.    Questionable filling defects in both the right and left common femoral   vein and deep venous thrombosis is not excluded.    Further evaluation with ultrasound is recommended.    Mild bilateral hydronephrosis    Indeterminate multiple fluid-filled/cystic lesions within the abdomen   measuring up to 6.5 cm, felt to likely be related to cystic metastatic   disease    Soft tissue density within the transverse colon adjacent to the splenic   flexure on image 52 series 2. Findings may be related to focal stool.   Underlying neoplasm cannot be excluded.    Subcutaneous nodule measuring 1.8 cm and subcentimeter sclerotic focus in   T12, metastatic disease is not excluded    Small rightpleural effusion.    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    Findings discussed with PRAMOD Nunes on 9/4/2024 at 5:59 PM with read back    --- End of Report ---            DOUGLAS KIM MD; Attending Radiologist  This document has been electronically signed. Sep  4 2024  6:05PM (09-04-24 @ 15:17)

## 2024-09-05 NOTE — CONSULT NOTE ADULT - SUBJECTIVE AND OBJECTIVE BOX
VASCULAR SURGERY CONSULT NOTE      HPI:   79-year-old female with past medical history of prediabetes presents for right groin pain with radiation down anterior leg and constipation x 2 days.  Patient has arrived  from [facility] 1 week ago and has been staying with family who have noticed decreased appetite and complaints of right groin pain worsening when walking and decreased bowel movements despite utilizing prune juice and increasing fluids. Tylenol occasionally improves pain.   Patient was seen by her primary Dr Jefferson  last night and directed to the emergency department.  Patient's daughter Queta notes patient underwent a recent surgery to her right colon in [facility], unsure what was done or what was removed but is concerned that her constipation and new pain could be related to the surgery   Denies any  fevers chills nausea vomiting diarrhea no numbness/weakness    off note:As per primary Dr Jefferson, reviewed records from [facility], pt was found to have metastatic cancer, possibly colon as primary. Was informed needed to start chemo, however there was no chemo available there at the time.      In Ed on vitals   · BP Systolic	 174 mm Hg  · BP Diastolic	83 mm Hg  · Heart Rate	68 /min  afebrile on RA     On labs Hb 10.2 , neutrophil % 90.3 , Na 130 , potassium 5.2 hemolysed   On imaging  On  CT A/P :   Large multiloculated cystic mass with areas of soft tissue, measuring   24.7 x 12.5 x 22.3 cm, extending from the pelvis into the upper abdomen.    Findings are concerning for malignancy; felt to likely be gynecologic in   etiology, but this mass remains indeterminate.    Questionable filling defects in both the right and left common femoral   vein and deep venous thrombosis is not excluded.      Mild bilateral hydronephrosis    Indeterminate multiple fluid-filled/cystic lesions within the abdomen   measuring up to 6.5 cm, felt to likely be related to cystic metastatic   disease    Soft tissue density within the transverse colon adjacent to the splenic   flexure on image 52 series 2. Findings may be related to focal stool.   Underlying neoplasm cannot be excluded.    Subcutaneous nodule measuring 1.8 cm and subcentimeter sclerotic focus in   T12, metastatic disease is not excluded    Small right pleural effusion.    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    As per Ed the prelim Duplex BLE report showed  DVTs from gastroc/popliteal/peroneal. Not including femoral        admitted for further workup (04 Sep 2024 20:27)        PAST MEDICAL & SURGICAL HISTORY:    No Known Allergies    Home Medications:    No permtinent family history of PVD    REVIEW OF SYSTEMS:  GENERAL:                                         negative  SKIN:                                                 negative  OPTHALMOLOGIC:                          negative  ENMT:                                               negative  RESPIRATORY AND THORAX:        negative  CARDIOVASCULAR:                        see HPI  GASTROINTESTINAL:                    see HPI  NEPHROLOGY:                                  negative  MUSCULOSKELETAL:                       negative  NEUROLOGIC:                                   negative  PSYCHIATRIC:                                    negative  HEMATOLOGY/LYMPHATICS:         negative  ENDOCRINE:                                     negative  ALLERGIC/IMMUNOLOGIC:            negative    12 point ROS otherwise normal except as stated in HPI  FHx: none  SHX:  [ ]  smoking     [ ] alcohol use    PHYSICAL EXAM  Vital Signs Last 24 Hrs  T(C): 36.3 (05 Sep 2024 05:06), Max: 36.5 (05 Sep 2024 00:28)  T(F): 97.4 (05 Sep 2024 05:06), Max: 97.7 (05 Sep 2024 00:28)  HR: 54 (05 Sep 2024 05:06) (54 - 90)  BP: 130/74 (05 Sep 2024 05:06) (130/74 - 182/84)  BP(mean): --  RR: 18 (05 Sep 2024 05:06) (16 - 18)  SpO2: 96% (04 Sep 2024 22:50) (96% - 99%)    Parameters below as of 04 Sep 2024 22:50  Patient On (Oxygen Delivery Method): room air        Appearance: Normal	  HEENT:   Normal oral mucosa, PERRL, EOMI	  Neck: Supple, - JVD  Cardiovascular: Normal S1 S2, No JVD, No murmurs,   Respiratory: Lungs clear to auscultation, No Rales, Rhonchi, Wheezing	  Gastrointestinal:  Soft, Non-tender, positive BS	  Skin: No rashes, No ecchymoses, No cyanosis  Extremities: Normal range of motion, No clubbing, cyanosis or edema  Neurologic: Non-focal  Psychiatry: A & O x 3, Mood & affect appropriate        MEDICATIONS:   MEDICATIONS  (STANDING):  acetaminophen     Tablet .. 650 milliGRAM(s) Oral every 8 hours  heparin  Infusion.  Unit(s)/Hr (11 mL/Hr) IV Continuous <Continuous>  polyethylene glycol 3350 17 Gram(s) Oral two times a day  senna 2 Tablet(s) Oral at bedtime    MEDICATIONS  (PRN):  heparin   Injectable 5000 Unit(s) IV Push every 6 hours PRN For aPTT less than 40  heparin   Injectable 2500 Unit(s) IV Push every 6 hours PRN For aPTT between 40 - 57  oxycodone    5 mG/acetaminophen 325 mG 1 Tablet(s) Oral every 6 hours PRN Severe Pain (7 - 10)      LAB/STUDIES:                        9.1    3.90  )-----------( 209      ( 05 Sep 2024 04:35 )             27.2     09-05    130<L>  |  96<L>  |  15  ----------------------------<  103<H>  4.2   |  26  |  1.0    Ca    8.3<L>      05 Sep 2024 04:35  Mg     1.9     09-05    TPro  5.3<L>  /  Alb  3.0<L>  /  TBili  0.4  /  DBili  x   /  AST  11  /  ALT  <5  /  AlkPhos  55  09-05    PT/INR - ( 04 Sep 2024 12:59 )   PT: 12.90 sec;   INR: 1.13 ratio         PTT - ( 05 Sep 2024 04:35 )  PTT:90.6 sec  LIVER FUNCTIONS - ( 05 Sep 2024 04:35 )  Alb: 3.0 g/dL / Pro: 5.3 g/dL / ALK PHOS: 55 U/L / ALT: <5 U/L / AST: 11 U/L / GGT: x             Urinalysis Basic - ( 05 Sep 2024 04:35 )    Color: x / Appearance: x / SG: x / pH: x  Gluc: 103 mg/dL / Ketone: x  / Bili: x / Urobili: x   Blood: x / Protein: x / Nitrite: x   Leuk Esterase: x / RBC: x / WBC x   Sq Epi: x / Non Sq Epi: x / Bacteria: x      IMAGING:     [facility] Duplex Lower Ext Vein Scan, Bilat (09.04.24 @ 19:09) >  Right lower extremity DVT in peroneal vein and gastrocnemius veins.    Left lower extremity DVT in gastrocnemius, posterior tibial, and peroneal   veins.

## 2024-09-05 NOTE — CONSULT NOTE ADULT - SUBJECTIVE AND OBJECTIVE BOX
INTERVENTIONAL RADIOLOGY CONSULT:     Procedure Requested: pelvic mass bx     HPI:   79-year-old female with past medical history of prediabetes presents for right groin pain with radiation down anterior leg and constipation x 2 days.  Patient has arrived  from [facility] 1 week ago and has been staying with family who have noticed decreased appetite and complaints of right groin pain worsening when walking and decreased bowel movements despite utilizing prune juice and increasing fluids. Tylenol occasionally improves pain.   Patient was seen by her primary Dr Jefferson  last night and directed to the emergency department.  Patient's daughter Queta notes patient underwent a recent surgery to her right colon and [facility], unsure what was done or what was removed but is concerned that her constipation and new pain could be related to the surgery   Denies any  fevers chills nausea vomiting diarrhea no numbness/weakness    off note:As per primary Dr Jefferson, reviewed records from [facility], pt was found to have metastatic cancer, possibly colon as primary. Was informed needed to start chemo, however there was no chemo available there at the time.      In Ed on vitals   · BP Systolic	 174 mm Hg  · BP Diastolic	83 mm Hg  · Heart Rate	68 /min  afebrile on RA     On labs Hb 10.2 , neutrophil % 90.3 , Na 130 , potassium 5.2 hemolysed   On imaging  On  CT A/P :   Large multiloculated cystic mass with areas of soft tissue, measuring   24.7 x 12.5 x 22.3 cm, extending from the pelvis into the upper abdomen.    Findings are concerning for malignancy; felt to likely be gynecologic in   etiology, but this mass remains indeterminate.    Questionable filling defects in both the right and left common femoral   vein and deep venous thrombosis is not excluded.      Mild bilateral hydronephrosis    Indeterminate multiple fluid-filled/cystic lesions within the abdomen   measuring up to 6.5 cm, felt to likely be related to cystic metastatic   disease    Soft tissue density within the transverse colon adjacent to the splenic   flexure on image 52 series 2. Findings may be related to focal stool.   Underlying neoplasm cannot be excluded.    Subcutaneous nodule measuring 1.8 cm and subcentimeter sclerotic focus in   T12, metastatic disease is not excluded    Small right pleural effusion.    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    As per Ed the prelim Duplex BLE report showed  DVTs from gastroc/popliteal/peroneal. Not including femoral        admitted for further workup (04 Sep 2024 20:27)      PAST MEDICAL & SURGICAL HISTORY:      MEDICATIONS  (STANDING):  acetaminophen     Tablet .. 650 milliGRAM(s) Oral every 8 hours  heparin  Infusion.  Unit(s)/Hr (11 mL/Hr) IV Continuous <Continuous>  polyethylene glycol 3350 17 Gram(s) Oral two times a day  senna 2 Tablet(s) Oral at bedtime    MEDICATIONS  (PRN):  heparin   Injectable 5000 Unit(s) IV Push every 6 hours PRN For aPTT less than 40  heparin   Injectable 2500 Unit(s) IV Push every 6 hours PRN For aPTT between 40 - 57  oxycodone    5 mG/acetaminophen 325 mG 1 Tablet(s) Oral every 6 hours PRN Severe Pain (7 - 10)      Allergies    No Known Allergies    Intolerances    Physical Exam:   Vital Signs Last 24 Hrs  T(C): 36.3 (05 Sep 2024 05:06), Max: 36.5 (05 Sep 2024 00:28)  T(F): 97.4 (05 Sep 2024 05:06), Max: 97.7 (05 Sep 2024 00:28)  HR: 54 (05 Sep 2024 05:06) (54 - 90)  BP: 130/74 (05 Sep 2024 05:06) (130/74 - 182/84)  BP(mean): --  RR: 18 (05 Sep 2024 05:06) (17 - 18)  SpO2: 96% (04 Sep 2024 22:50) (96% - 99%)    Parameters below as of 04 Sep 2024 22:50  Patient On (Oxygen Delivery Method): room air      Labs:                         9.1    3.90  )-----------( 209      ( 05 Sep 2024 04:35 )             27.2     09-05    130<L>  |  96<L>  |  15  ----------------------------<  103<H>  4.2   |  26  |  1.0    Ca    8.3<L>      05 Sep 2024 04:35  Mg     1.9     09-05    TPro  5.3<L>  /  Alb  3.0<L>  /  TBili  0.4  /  DBili  x   /  AST  11  /  ALT  <5  /  AlkPhos  55  09-05    PT/INR - ( 04 Sep 2024 12:59 )   PT: 12.90 sec;   INR: 1.13 ratio         PTT - ( 05 Sep 2024 04:35 )  PTT:90.6 sec    Pertinent labs:                      9.1    3.90  )-----------( 209      ( 05 Sep 2024 04:35 )             27.2       09-05    130<L>  |  96<L>  |  15  ----------------------------<  103<H>  4.2   |  26  |  1.0    Ca    8.3<L>      05 Sep 2024 04:35  Mg     1.9     09-05    TPro  5.3<L>  /  Alb  3.0<L>  /  TBili  0.4  /  DBili  x   /  AST  11  /  ALT  <5  /  AlkPhos  55  09-05      PT/INR - ( 04 Sep 2024 12:59 )   PT: 12.90 sec;   INR: 1.13 ratio         PTT - ( 05 Sep 2024 04:35 )  PTT:90.6 sec    Radiology & Additional Studies:     IMPRESSION:    Large multiloculated cystic mass with areas of soft tissue, measuring   24.7 x 12.5 x 22.3 cm, extending from the pelvis into the upper abdomen.    Findings are concerning for malignancy; felt to likely be gynecologic in   etiology, but this mass remains indeterminate.    Questionable filling defects in both the right and left common femoral   vein and deep venous thrombosis is not excluded.    Further evaluation with ultrasound is recommended.    Mild bilateral hydronephrosis    Indeterminate multiple fluid-filled/cystic lesions within the abdomen   measuring up to 6.5 cm, felt to likely be related to cystic metastatic   disease    Soft tissue density within the transverse colon adjacent to the splenic   flexure on image 52 series 2. Findings may be related to focal stool.   Underlying neoplasm cannot be excluded.    Subcutaneous nodule measuring 1.8 cm and subcentimeter sclerotic focus in   T12, metastatic disease is not excluded    Small rightpleural effusion.    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    Findings discussed with PRAMOD Nunes on 9/4/2024 at 5:59 PM with read back    --- End of Report ---      Radiology imaging reviewed.       ASSESSMENT/ PLAN:  79-year-old female with past medical history of prediabetes presents for right groin pain with radiation down anterior leg and constipation x 2 days.  Patient has arrived  from [facility] 1 week ago and has been staying with family who have noticed decreased appetite and complaints of right groin pain worsening when walking and decreased bowel movements. Patient was seen by her primary Dr Jefferson  last night and directed to the emergency department.  Patient's daughter Queta notes patient underwent a recent surgery to her right colon and [facility], unsure what was done or what was removed but is concerned that her constipation and new pain could be related to the surgery. Imaging findings show large multiloculated cystic mass with areas of soft tissue, extending from the pelvis into the upper abdomen. IR consulted for biopsy.  - origin of mass unclear, possible primary ovarian ca  - perc bx high risk due to possible seeding/spilling of neoplastic fluid into abdomen   - recommend GYN ONC consult   - will follow up once GYN ONC recommendations noted       To contact Interventional Radiology with any questions, concerns or issues please utilize the following:  M-F 7am-5pm: [facility]_ir on teams,  consult spectra: [phone]  After hours/weekends/holidays: [phone]

## 2024-09-05 NOTE — CONSULT NOTE ADULT - ASSESSMENT
78 yo female with PMHx of prediabetes presents for right groin pain with radiation down anterior leg and constipation x 2 days.  Patient has arrived  from [facility] 1 week ago and has been staying with family who have noticed decreased appetite and complaints of right groin pain worsening when walking and decreased bowel movements. Patient was seen by her primary Dr Jefferson  last night and directed to the emergency department.  Patient's daughter Queta notes patient underwent a recent surgery to her right colon and [facility], unsure what was done or what was removed but is concerned that her constipation and new pain could be related to the surgery. Imaging findings show large multiloculated cystic mass with areas of soft tissue, extending from the pelvis into the upper abdomen. GI consulted for further eval.       # 24.7 x 12.5 x 22.3 cm large multiloculated cystic mass extending from the pelvis into the upper abdomen  # Constipation 2/2 extrinsic luminal compression  # S/p recent sx in [facility] unsure what was done   - CT AP Soft tissue density within the transverse colon adjacent to the splenic flexure.  - Hgb 9.1, MCV 83.4   - passing flatus and small BMs   - origin of mass unclear, possible primary ovarian ca  - as per IR: perc bx high risk due to possible seeding/spilling of neoplastic fluid into abdomen  - no prior scopes in the past  - FHx neg for GI malignancy    Plan   - please consult OBGYN for pelvic mass  - pt will benefit from outpatient colonoscopy  - bowel regimen  - serial abdominal physical exams   - avoid opiates  - monitor BMs  - encourage ambulation   - Discussed with pt's cousin Donna at bedside

## 2024-09-05 NOTE — PROGRESS NOTE ADULT - SUBJECTIVE AND OBJECTIVE BOX
SUBJECTIVE/OVERNIGHT EVENTS  Today is hospital day 1d. This morning patient was seen and examined at bedside, resting comfortably in bed. No acute or major events overnight.    MEDICATIONS  STANDING MEDICATIONS  acetaminophen     Tablet .. 650 milliGRAM(s) Oral every 8 hours  heparin  Infusion.  Unit(s)/Hr IV Continuous <Continuous>  polyethylene glycol 3350 17 Gram(s) Oral two times a day  senna 2 Tablet(s) Oral at bedtime    PRN MEDICATIONS  heparin   Injectable 5000 Unit(s) IV Push every 6 hours PRN  heparin   Injectable 2500 Unit(s) IV Push every 6 hours PRN  oxycodone    5 mG/acetaminophen 325 mG 1 Tablet(s) Oral every 6 hours PRN    VITALS  T(F): 97.4 (09-05-24 @ 05:06), Max: 97.7 (09-05-24 @ 00:28)  HR: 54 (09-05-24 @ 05:06) (54 - 90)  BP: 130/74 (09-05-24 @ 05:06) (130/74 - 182/84)  RR: 18 (09-05-24 @ 05:06) (17 - 18)  SpO2: 96% (09-04-24 @ 22:50) (96% - 99%)    PHYSICAL EXAM  GENERAL  (x  ) NAD, lying in bed comfortably     (  ) obtunded     (  ) lethargic     (  ) somnolent    HEAD  ( x ) Atraumatic     (  ) hematoma     (  ) laceration (specify location:       )     NECK  (  x) Supple     (  ) neck stiffness     (  ) nuchal rigidity     (  )  no JVD     (  ) JVD present ( -- cm)    HEART  Rate -->  ( x ) normal rate    (  ) bradycardic    (  ) tachycardic  Rhythm -->  (  ) regular    (  ) regularly irregular    (  ) irregularly irregular  Murmurs -->  (  ) normal s1/s2    (  ) systolic murmur    (  ) diastolic murmur    (  ) continuous murmur     (  ) S3 present    (  ) S4 present    LUNGS  (x  )Unlabored respirations     (  ) tachypnea  (  ) B/L air entry     (  ) decreased breath sounds in:  (location     )    (  ) no adventitious sound     (  ) crackles     (  ) wheezing      (  ) rhonchi      (specify location:       )  (  ) chest wall tenderness (specify location:       )    ABDOMEN  (  ) Soft     (  ) tense   |   (  ) nondistended     ( x ) distended   |   (  ) +BS     (  ) hypoactive bowel sounds     (  ) hyperactive bowel sounds  (x  ) nontender     (  ) RUQ tenderness     (  ) RLQ tenderness     (  ) LLQ tenderness     (  ) epigastric tenderness     (  ) diffuse tenderness  (  ) Splenomegaly      (  ) Hepatomegaly      (  ) Jaundice     (  ) ecchymosis     EXTREMITIES  (  ) Normal     (  ) Rash     (  ) ecchymosis     (  ) varicose veins      (  ) pitting edema     (  ) non-pitting edema   (  ) ulceration     (  ) gangrene:     (location:     )    NERVOUS SYSTEM  ( x ) A&Ox3     (  ) confused     (  ) lethargic  CN II-XII:     (  ) Intact     (  ) focal deficits  (Specify:     )   Upper extremities:     (  ) strength X/5     (  ) focal deficit (specify:    )  Lower extremities:     (  ) strength  X/5    (  ) focal deficit (specify:    )    SKIN  (  ) No rashes or lesions     (  ) maculopapular rash     (  ) pustules     (  ) vesicles     (  ) ulcer     (  ) ecchymosis     (specify location:     )    (  ) Indwelling Fischer Catheter   Date insterted:    Reason (  ) Critical illness     (  ) urinary retention    (  ) Accurate Ins/Outs Monitoring     (  ) CMO patient    (  ) Central Line  Date inserted:  Location: (  ) Right IJ   (  ) Left IJ   (  ) Right Fem   (  ) Left Fem    (  ) SPC  (  ) pigtail  (  ) PEG tube  (  ) colostomy  (  ) jejunostomy  (  ) U-Dall    LABS             9.1    3.90  )-----------( 209      ( 09-05-24 @ 04:35 )             27.2     130  |  96  |  15  -------------------------<  103   09-05-24 @ 04:35  4.2  |  26  |  1.0    Ca      8.3     09-05-24 @ 04:35  Mg     1.9     09-05-24 @ 04:35    TPro  5.3  /  Alb  3.0  /  TBili  0.4  /  DBili  x   /  AST  11  /  ALT  <5  /  AlkPhos  55  /  GGT  x     09-05-24 @ 04:35    PT/INR - ( 09-04-24 @ 12:59 )   PT: 12.90 sec<H>;   INR: 1.13 ratio  PTT - ( 09-05-24 @ 12:20 )  PTT:51.5 sec      Urinalysis Basic - ( 05 Sep 2024 04:35 )    Color: x / Appearance: x / SG: x / pH: x  Gluc: 103 mg/dL / Ketone: x  / Bili: x / Urobili: x   Blood: x / Protein: x / Nitrite: x   Leuk Esterase: x / RBC: x / WBC x   Sq Epi: x / Non Sq Epi: x / Bacteria: x          IMAGING   VA Duplex Lower Ext Vein Scan, Bilat (09.04.24 @ 19:09)   IMPRESSION:  Right lower extremity DVT in peroneal vein and gastrocnemius veins.    Left lower extremity DVT in gastrocnemius, posterior tibial, and peroneal   veins.      CT Chest w/ IV Cont (09.04.24 @ 15:30) >  IMPRESSION:    Large multiloculated cystic mass with areas of soft tissue, measuring   24.7 x 12.5 x 22.3 cm, extending from the pelvis into the upper abdomen.    Findings are concerning for malignancy; felt to likely be gynecologic in   etiology, but this mass remains indeterminate.    Questionable filling defects in both the right and left common femoral   vein and deep venous thrombosis is not excluded.    Further evaluation with ultrasound is recommended.    Mild bilateral hydronephrosis    Indeterminate multiple fluid-filled/cystic lesions within the abdomen   measuring up to 6.5 cm, felt to likely be related to cystic metastatic   disease    Soft tissue density within the transverse colon adjacent to the splenic   flexure on image 52 series 2. Findings may be related to focal stool.   Underlying neoplasm cannot be excluded.    Subcutaneous nodule measuring 1.8 cm and subcentimeter sclerotic focus in   T12, metastatic disease is not excluded    Small rightpleural effusion.    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    Findings discussed with PRAMOD Nunes on 9/4/2024 at 5:59 PM with read back SUBJECTIVE/OVERNIGHT EVENTS  Today is hospital day 1d. This morning patient was seen and examined at bedside, resting comfortably in bed. No acute or major events overnight. Patient is alert and oriented, Ethiopian speaking. Cachetic and ill appearing. Denies nausea, vomiting, and pain at this time    MEDICATIONS  STANDING MEDICATIONS  acetaminophen     Tablet .. 650 milliGRAM(s) Oral every 8 hours  heparin  Infusion.  Unit(s)/Hr IV Continuous <Continuous>  polyethylene glycol 3350 17 Gram(s) Oral two times a day  senna 2 Tablet(s) Oral at bedtime    PRN MEDICATIONS  heparin   Injectable 5000 Unit(s) IV Push every 6 hours PRN  heparin   Injectable 2500 Unit(s) IV Push every 6 hours PRN  oxycodone    5 mG/acetaminophen 325 mG 1 Tablet(s) Oral every 6 hours PRN    VITALS  T(F): 97.4 (09-05-24 @ 05:06), Max: 97.7 (09-05-24 @ 00:28)  HR: 54 (09-05-24 @ 05:06) (54 - 90)  BP: 130/74 (09-05-24 @ 05:06) (130/74 - 182/84)  RR: 18 (09-05-24 @ 05:06) (17 - 18)  SpO2: 96% (09-04-24 @ 22:50) (96% - 99%)    PHYSICAL EXAM  GENERAL  (x  ) NAD, lying in bed comfortably     (  ) obtunded     (  ) lethargic     (  ) somnolent    HEAD  ( x ) Atraumatic     (  ) hematoma     (  ) laceration (specify location:       )     NECK  (  x) Supple     (  ) neck stiffness     (  ) nuchal rigidity     (  )  no JVD     (  ) JVD present ( -- cm)    HEART  Rate -->  ( x ) normal rate    (  ) bradycardic    (  ) tachycardic  Rhythm -->  (  ) regular    (  ) regularly irregular    (  ) irregularly irregular  Murmurs -->  (  ) normal s1/s2    (  ) systolic murmur    (  ) diastolic murmur    (  ) continuous murmur     (  ) S3 present    (  ) S4 present    LUNGS  (x  )Unlabored respirations     (  ) tachypnea  (  ) B/L air entry     (  ) decreased breath sounds in:  (location     )    (  ) no adventitious sound     (  ) crackles     (  ) wheezing      (  ) rhonchi      (specify location:       )  (  ) chest wall tenderness (specify location:       )    ABDOMEN  (  ) Soft     (  ) tense   |   (  ) nondistended     ( x ) distended   |   (  ) +BS     (  ) hypoactive bowel sounds     (  ) hyperactive bowel sounds  (x  ) nontender     (  ) RUQ tenderness     (  ) RLQ tenderness     (  ) LLQ tenderness     (  ) epigastric tenderness     (  ) diffuse tenderness  (  ) Splenomegaly      (  ) Hepatomegaly      (  ) Jaundice     (  ) ecchymosis     EXTREMITIES  (  ) Normal     (  ) Rash     (  ) ecchymosis     (  ) varicose veins      (  ) pitting edema     (  ) non-pitting edema   (  ) ulceration     (  ) gangrene:     (location:     )    NERVOUS SYSTEM  ( x ) A&Ox3     (  ) confused     (  ) lethargic  CN II-XII:     (  ) Intact     (  ) focal deficits  (Specify:     )   Upper extremities:     (  ) strength X/5     (  ) focal deficit (specify:    )  Lower extremities:     (  ) strength  X/5    (  ) focal deficit (specify:    )    SKIN  (  ) No rashes or lesions     (  ) maculopapular rash     (  ) pustules     (  ) vesicles     (  ) ulcer     (  ) ecchymosis     (specify location:     )    (  ) Indwelling Fischer Catheter   Date insterted:    Reason (  ) Critical illness     (  ) urinary retention    (  ) Accurate Ins/Outs Monitoring     (  ) CMO patient    (  ) Central Line  Date inserted:  Location: (  ) Right IJ   (  ) Left IJ   (  ) Right Fem   (  ) Left Fem    (  ) SPC  (  ) pigtail  (  ) PEG tube  (  ) colostomy  (  ) jejunostomy  (  ) U-Dall    LABS             9.1    3.90  )-----------( 209      ( 09-05-24 @ 04:35 )             27.2     130  |  96  |  15  -------------------------<  103   09-05-24 @ 04:35  4.2  |  26  |  1.0    Ca      8.3     09-05-24 @ 04:35  Mg     1.9     09-05-24 @ 04:35    TPro  5.3  /  Alb  3.0  /  TBili  0.4  /  DBili  x   /  AST  11  /  ALT  <5  /  AlkPhos  55  /  GGT  x     09-05-24 @ 04:35    PT/INR - ( 09-04-24 @ 12:59 )   PT: 12.90 sec<H>;   INR: 1.13 ratio  PTT - ( 09-05-24 @ 12:20 )  PTT:51.5 sec      Urinalysis Basic - ( 05 Sep 2024 04:35 )    Color: x / Appearance: x / SG: x / pH: x  Gluc: 103 mg/dL / Ketone: x  / Bili: x / Urobili: x   Blood: x / Protein: x / Nitrite: x   Leuk Esterase: x / RBC: x / WBC x   Sq Epi: x / Non Sq Epi: x / Bacteria: x          IMAGING   VA Duplex Lower Ext Vein Scan, Bilat (09.04.24 @ 19:09)   IMPRESSION:  Right lower extremity DVT in peroneal vein and gastrocnemius veins.    Left lower extremity DVT in gastrocnemius, posterior tibial, and peroneal   veins.      CT Chest w/ IV Cont (09.04.24 @ 15:30) >  IMPRESSION:    Large multiloculated cystic mass with areas of soft tissue, measuring   24.7 x 12.5 x 22.3 cm, extending from the pelvis into the upper abdomen.    Findings are concerning for malignancy; felt to likely be gynecologic in   etiology, but this mass remains indeterminate.    Questionable filling defects in both the right and left common femoral   vein and deep venous thrombosis is not excluded.    Further evaluation with ultrasound is recommended.    Mild bilateral hydronephrosis    Indeterminate multiple fluid-filled/cystic lesions within the abdomen   measuring up to 6.5 cm, felt to likely be related to cystic metastatic   disease    Soft tissue density within the transverse colon adjacent to the splenic   flexure on image 52 series 2. Findings may be related to focal stool.   Underlying neoplasm cannot be excluded.    Subcutaneous nodule measuring 1.8 cm and subcentimeter sclerotic focus in   T12, metastatic disease is not excluded    Small rightpleural effusion.    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    Findings discussed with PRAMOD Nunes on 9/4/2024 at 5:59 PM with read back

## 2024-09-06 LAB
A1C WITH ESTIMATED AVERAGE GLUCOSE RESULT: 5.4 % — SIGNIFICANT CHANGE UP (ref 4–5.6)
ALBUMIN SERPL ELPH-MCNC: 3.2 G/DL — LOW (ref 3.5–5.2)
ALP SERPL-CCNC: 61 U/L — SIGNIFICANT CHANGE UP (ref 30–115)
ALT FLD-CCNC: 6 U/L — SIGNIFICANT CHANGE UP (ref 0–41)
ANION GAP SERPL CALC-SCNC: 10 MMOL/L — SIGNIFICANT CHANGE UP (ref 7–14)
APTT BLD: 36.1 SEC — SIGNIFICANT CHANGE UP (ref 27–39.2)
APTT BLD: 87.2 SEC — CRITICAL HIGH (ref 27–39.2)
APTT BLD: 92.4 SEC — CRITICAL HIGH (ref 27–39.2)
AST SERPL-CCNC: 12 U/L — SIGNIFICANT CHANGE UP (ref 0–41)
BASOPHILS # BLD AUTO: 0.01 K/UL — SIGNIFICANT CHANGE UP (ref 0–0.2)
BASOPHILS NFR BLD AUTO: 0.2 % — SIGNIFICANT CHANGE UP (ref 0–1)
BILIRUB SERPL-MCNC: 0.4 MG/DL — SIGNIFICANT CHANGE UP (ref 0.2–1.2)
BUN SERPL-MCNC: 14 MG/DL — SIGNIFICANT CHANGE UP (ref 10–20)
CALCIUM SERPL-MCNC: 8.5 MG/DL — SIGNIFICANT CHANGE UP (ref 8.4–10.5)
CHLORIDE SERPL-SCNC: 94 MMOL/L — LOW (ref 98–110)
CO2 SERPL-SCNC: 28 MMOL/L — SIGNIFICANT CHANGE UP (ref 17–32)
CREAT SERPL-MCNC: 1.1 MG/DL — SIGNIFICANT CHANGE UP (ref 0.7–1.5)
EGFR: 52 ML/MIN/1.73M2 — LOW
EOSINOPHIL # BLD AUTO: 0 K/UL — SIGNIFICANT CHANGE UP (ref 0–0.7)
EOSINOPHIL NFR BLD AUTO: 0 % — SIGNIFICANT CHANGE UP (ref 0–8)
ESTIMATED AVERAGE GLUCOSE: 108 MG/DL — SIGNIFICANT CHANGE UP (ref 68–114)
ESTRADIOL FREE SERPL-MCNC: 15 PG/ML — SIGNIFICANT CHANGE UP
GLUCOSE BLDC GLUCOMTR-MCNC: 117 MG/DL — HIGH (ref 70–99)
GLUCOSE BLDC GLUCOMTR-MCNC: 127 MG/DL — HIGH (ref 70–99)
GLUCOSE BLDC GLUCOMTR-MCNC: 131 MG/DL — HIGH (ref 70–99)
GLUCOSE BLDC GLUCOMTR-MCNC: 143 MG/DL — HIGH (ref 70–99)
GLUCOSE SERPL-MCNC: 94 MG/DL — SIGNIFICANT CHANGE UP (ref 70–99)
HCT VFR BLD CALC: 30.3 % — LOW (ref 37–47)
HGB BLD-MCNC: 9.7 G/DL — LOW (ref 12–16)
IMM GRANULOCYTES NFR BLD AUTO: 0.7 % — HIGH (ref 0.1–0.3)
LYMPHOCYTES # BLD AUTO: 1.03 K/UL — LOW (ref 1.2–3.4)
LYMPHOCYTES # BLD AUTO: 23 % — SIGNIFICANT CHANGE UP (ref 20.5–51.1)
MAGNESIUM SERPL-MCNC: 2.1 MG/DL — SIGNIFICANT CHANGE UP (ref 1.8–2.4)
MCHC RBC-ENTMCNC: 28 PG — SIGNIFICANT CHANGE UP (ref 27–31)
MCHC RBC-ENTMCNC: 32 G/DL — SIGNIFICANT CHANGE UP (ref 32–37)
MCV RBC AUTO: 87.3 FL — SIGNIFICANT CHANGE UP (ref 81–99)
MONOCYTES # BLD AUTO: 0.44 K/UL — SIGNIFICANT CHANGE UP (ref 0.1–0.6)
MONOCYTES NFR BLD AUTO: 9.8 % — HIGH (ref 1.7–9.3)
NEUTROPHILS # BLD AUTO: 2.97 K/UL — SIGNIFICANT CHANGE UP (ref 1.4–6.5)
NEUTROPHILS NFR BLD AUTO: 66.3 % — SIGNIFICANT CHANGE UP (ref 42.2–75.2)
NRBC # BLD: 0 /100 WBCS — SIGNIFICANT CHANGE UP (ref 0–0)
PHOSPHATE SERPL-MCNC: 3.9 MG/DL — SIGNIFICANT CHANGE UP (ref 2.1–4.9)
PLATELET # BLD AUTO: 257 K/UL — SIGNIFICANT CHANGE UP (ref 130–400)
PMV BLD: 11 FL — HIGH (ref 7.4–10.4)
POTASSIUM SERPL-MCNC: 4.4 MMOL/L — SIGNIFICANT CHANGE UP (ref 3.5–5)
POTASSIUM SERPL-SCNC: 4.4 MMOL/L — SIGNIFICANT CHANGE UP (ref 3.5–5)
PROT SERPL-MCNC: 6 G/DL — SIGNIFICANT CHANGE UP (ref 6–8)
RBC # BLD: 3.47 M/UL — LOW (ref 4.2–5.4)
RBC # FLD: 17.5 % — HIGH (ref 11.5–14.5)
SODIUM SERPL-SCNC: 132 MMOL/L — LOW (ref 135–146)
WBC # BLD: 4.48 K/UL — LOW (ref 4.8–10.8)
WBC # FLD AUTO: 4.48 K/UL — LOW (ref 4.8–10.8)

## 2024-09-06 PROCEDURE — 76830 TRANSVAGINAL US NON-OB: CPT | Mod: 26

## 2024-09-06 PROCEDURE — 99254 IP/OBS CNSLTJ NEW/EST MOD 60: CPT

## 2024-09-06 RX ORDER — CHLORHEXIDINE GLUCONATE 40 MG/ML
1 SOLUTION TOPICAL DAILY
Refills: 0 | Status: DISCONTINUED | OUTPATIENT
Start: 2024-09-06 | End: 2024-09-12

## 2024-09-06 RX ADMIN — POLYETHYLENE GLYCOL 3350 17 GRAM(S): 17 POWDER, FOR SOLUTION ORAL at 06:28

## 2024-09-06 RX ADMIN — Medication 1300 UNIT(S)/HR: at 19:23

## 2024-09-06 RX ADMIN — ACETAMINOPHEN 650 MILLIGRAM(S): 325 TABLET ORAL at 22:04

## 2024-09-06 RX ADMIN — ACETAMINOPHEN 650 MILLIGRAM(S): 325 TABLET ORAL at 12:59

## 2024-09-06 RX ADMIN — Medication 1000 UNIT(S)/HR: at 01:01

## 2024-09-06 RX ADMIN — Medication 2 TABLET(S): at 22:03

## 2024-09-06 RX ADMIN — METOPROLOL TARTRATE 50 MILLIGRAM(S): 100 TABLET ORAL at 06:28

## 2024-09-06 RX ADMIN — Medication 1000 UNIT(S)/HR: at 06:16

## 2024-09-06 RX ADMIN — ACETAMINOPHEN 650 MILLIGRAM(S): 325 TABLET ORAL at 23:09

## 2024-09-06 RX ADMIN — Medication 20 MILLIGRAM(S): at 22:04

## 2024-09-06 RX ADMIN — CHLORHEXIDINE GLUCONATE 1 APPLICATION(S): 40 SOLUTION TOPICAL at 13:01

## 2024-09-06 RX ADMIN — Medication 1300 UNIT(S)/HR: at 15:20

## 2024-09-06 RX ADMIN — Medication 1300 UNIT(S)/HR: at 22:44

## 2024-09-06 RX ADMIN — ACETAMINOPHEN 650 MILLIGRAM(S): 325 TABLET ORAL at 06:28

## 2024-09-06 RX ADMIN — ACETAMINOPHEN 650 MILLIGRAM(S): 325 TABLET ORAL at 13:27

## 2024-09-06 RX ADMIN — Medication 1000 UNIT(S)/HR: at 07:43

## 2024-09-06 NOTE — PROGRESS NOTE ADULT - ASSESSMENT
79-year-old female with past medical history of prediabetes presents for right groin pain with radiation down anterior leg and constipation x 2 days.  Patient has arrived  from [facility] 1 week ago and has been staying with family who have noticed decreased appetite and complaints of right groin pain worsening when walking and decreased bowel movements despite utilizing prune juice and increasing fluids. Tylenol occasionally improves pain. Patient's daughter Queta notes patient underwent a recent surgery to her right colon in [facility], unsure what was done or what was removed but is concerned that her constipation and new pain could be related to the surgery. CTAP showed large multiloculated cystic mass extending from the pelvis into the abdomen as well as multiple fluid-filled cystic lesions within the abdomen concerning for malignancy with metastasis. Patient admitted for further work up as well as treatment for bilateral LE DVT.    #bilateral DVTs  VA Duplex LE bilateral: Right lower extremity DVT in peroneal vein and gastrocnemius veins. Left lower extremity DVT in gastrocnemius, posterior tibial, and peroneal veins.  Patient is high risk given likely malignancy.  - c/w heparin full AC  - daily cbc  - transfuse for Hgb < 7, keep active T&S    #METASTATIC CANCER :   #Large multiloculated cystic mass with areas of soft tissue:  #Constipation   #Decreased Appetite  - ED vitals notable for  ,afebrile, on RA; ED labs notable for neutrophil 90.3%, potassium 5.2 hemolyzed   CTAP : Large multiloculated cystic mass with areas of soft tissue, measuring 24.7 x 12.5 x 22.3 cm, extending from the pelvis into the upper abdomen. Questionable filling defects in both the right and left common femoral vein and deep venous thrombosis is not excluded. Indeterminate multiple fluid-filled/cystic lesions within the abdomen measuring up to 6.5 cm, felt to likely be related to cystic metastatic disease. Soft tissue density within the transverse colon adjacent to the splenic flexure on image 52 series 2. Findings may be related to focal stool. Subcutaneous nodule measuring 1.8 cm and subcentimeter sclerotic focus in T12, metastatic disease is not excluded  - Dr Jefferson, reviewed records from [facility], pt was found to have metastatic cancer, possibly colon as primary. Was informed needed to start chemo, however there was no chemo available there at the time.  -pain control  -EKG for QTc, f/u   -Zofran PRN for vomiting   - Calorie count   - Bowel regimen   - monitor bowel movement   - IR following  - GI following  - f/u recs from Gyn Onc  -f/u PT eval     #Hyponatremia  - most likely due to decreased PO intake   - Na 132, improving  - daily CMP  - calorie count, encourage po intake    #Mild Hydronephrosis  - seen on CTA/P    - creat 1.0, unknown baseline   -bladder scan q6 for rentention   -can consider urology c/s if condition worsen     #Small Pleural effusion - resolved  - seen on CT A/P  - on RA   - CXR this AM no cardiopulmonary disease  - monitor fluid balance and pulse ox    #AORTIC ANEURYSM  4.5 cm   - systolic blood pressure was 170 in ED  - monitor Blood pressure, has been wnl   - unable to confirm med rec  - will start with toprol xl 50mg daily  - atorvastatin 20mg daily    #Prediabetes  - FS  - ISS    DVT ppx: heparin drip  Diet: DASH   Activity: ATT       F/u: iron panel, vitamin b12, folate, LDH, Haptoglobin, Retics, , , inhibin a/b, estradiol, CEA, TVUS  Consults: gyn onc, heme onc, GI, IR for potential biopsy   18

## 2024-09-06 NOTE — CONSULT NOTE ADULT - ASSESSMENT
79-year-old female with past medical history of prediabetes presents for right groin pain with radiation down anterior leg and constipation x 2 days.  Patient has arrived  from [facility] 1 week ago and has been staying with family who have noticed decreased appetite and complaints of right groin pain worsening when walking and decreased bowel movements despite utilizing prune juice and increasing fluids. Tylenol occasionally improves pain.   Patient was seen by her primary Dr Jefferson  last night and directed to the emergency department.  Patient's daughter Queta notes patient underwent a recent surgery to her right colon and [facility], unsure what was done or what was removed but is concerned that her constipation and new pain could be related to the surgery   Denies any  fevers chills nausea vomiting diarrhea no numbness/weakness    #      79-year-old female with past medical history of prediabetes presents for right groin pain with radiation down anterior leg and constipation x 2 days.  Patient has arrived  from [facility] 1 week ago and has been staying with family who have noticed decreased appetite and complaints of right groin pain worsening when walking and decreased bowel movements despite utilizing prune juice and increasing fluids. Tylenol occasionally improves pain.   Patient was seen by her primary Dr Jefferson  last night and directed to the emergency department.  Patient's daughter Queta notes patient underwent a recent surgery to her right colon and [facility], unsure what was done or what was removed but is concerned that her constipation and new pain could be related to the surgery   Denies any  fevers chills nausea vomiting diarrhea no numbness/weakness    # Pelvic mass  Interminate multiple fluid filled cystic lesions within abdomen measuring upto 6.5 cm  Subcutaneous nodule measuring 1.8 cm and subcentimeter sclerotic focus T12    Will need to obtain records from [facility], as per Dr. Montana's note, patient had Ex lap, no resection was done  Gyn on board, planning for endometrial biopsy  Will need tissue diagnosis for starting further treatment.    # Mild leukopenia  # Normocytic anemia:  Check iron panel, vitamin b12, folate, LDH, Haptoglobin, Retics   79-year-old female with past medical history of prediabetes presents for right groin pain with radiation down anterior leg and constipation x 2 days.  Patient has arrived  from [facility] 1 week ago and has been staying with family who have noticed decreased appetite and complaints of right groin pain worsening when walking and decreased bowel movements despite utilizing prune juice and increasing fluids. Tylenol occasionally improves pain.   Patient was seen by her primary Dr Jefferson  last night and directed to the emergency department.  Patient's daughter Queta notes patient underwent a recent surgery to her right colon and [facility], unsure what was done or what was removed but is concerned that her constipation and new pain could be related to the surgery   Denies any  fevers chills nausea vomiting diarrhea no numbness/weakness    # Pelvic mass  Interminate multiple fluid filled cystic lesions within abdomen measuring upto 6.5 cm  Subcutaneous nodule measuring 1.8 cm and subcentimeter sclerotic focus T12    Will need to obtain records from [facility], as per Dr. Montana's note, patient had Ex lap, no resection was done  Gyn on board, planning for endometrial biopsy.  If endometrial biopsy not conclusive, will need biopsy from either subcutaneous lesion/ pelvic mass  Will need tissue diagnosis for starting further treatment.    # Mild leukopenia  # Normocytic anemia:  Check iron panel, vitamin b12, folate, LDH, Haptoglobin, Retics   We tried Turkmen interpretor, however patient couldnt understand the dialect.  We spoke to the patient's cousin, she speaks "Serbiocroatian" dialect, which interpretor was not available.  we discussed with cousin about our plan.    79-year-old female with past medical history of prediabetes presents for right groin pain with radiation down anterior leg and constipation x 2 days.  Patient has arrived  from [facility] 1 week ago and has been staying with family who have noticed decreased appetite and complaints of right groin pain worsening when walking and decreased bowel movements despite utilizing prune juice and increasing fluids. Tylenol occasionally improves pain.   Patient was seen by her primary Dr Jefferson  last night and directed to the emergency department.  Patient's daughter Queta notes patient underwent a recent surgery to her right colon and [facility], unsure what was done or what was removed but is concerned that her constipation and new pain could be related to the surgery   Denies any  fevers chills nausea vomiting diarrhea no numbness/weakness    # Pelvic mass  Interminate multiple fluid filled cystic lesions within abdomen measuring upto 6.5 cm  Subcutaneous nodule measuring 1.8 cm and subcentimeter sclerotic focus T12    Will need to obtain records from [facility], as per Dr. Montana's note, patient had Ex lap, no resection was done  Gyn on board, planning for endometrial biopsy.  If endometrial biopsy not conclusive, will need biopsy from either subcutaneous lesion/ pelvic mass  Will need tissue diagnosis for starting further treatment.    # Mild leukopenia  # Normocytic anemia:  Check iron panel, vitamin b12, folate, LDH, Haptoglobin, Retics   We tried Estonian interpretor, however patient couldnt understand the dialect.  We spoke to the patient's cousin, she speaks "Serbiocroatian" dialect, which interpretor was not available.  we discussed with cousin about our plan.    79-year-old female with past medical history of prediabetes presents for right groin pain with radiation down anterior leg and constipation x 2 days.  Patient has arrived  from [facility] 1 week ago and has been staying with family who have noticed decreased appetite and complaints of right groin pain worsening when walking and decreased bowel movements despite utilizing prune juice and increasing fluids. Tylenol occasionally improves pain.   Patient was seen by her primary Dr Jefferson  last night and directed to the emergency department.  Patient's daughter Queta notes patient underwent a recent surgery to her right colon and [facility], unsure what was done or what was removed but is concerned that her constipation and new pain could be related to the surgery   Denies any  fevers chills nausea vomiting diarrhea no numbness/weakness    # Pelvic mass  Interminate multiple fluid filled cystic lesions within abdomen measuring upto 6.5 cm  Subcutaneous nodule measuring 1.8 cm and subcentimeter sclerotic focus T12    Will need to obtain records from [facility], as per Dr. Montana's note, patient had Ex lap, no resection was done  Gyn on board, planning for endometrial biopsy.  If endometrial biopsy not conclusive, will need biopsy from either subcutaneous lesion/ pelvic mass , colonoscopy ?  Will need tissue diagnosis for starting further treatment.    # Mild leukopenia  # Normocytic anemia:  Check iron panel, vitamin b12, folate, LDH, Haptoglobin, Retics

## 2024-09-06 NOTE — PROGRESS NOTE ADULT - SUBJECTIVE AND OBJECTIVE BOX
SUBJECTIVE/OVERNIGHT EVENTS  Today is hospital day 2d. This morning patient was seen and examined at bedside, resting comfortably in bed. No acute or major events overnight. Poor po intake. Bowel movement this AM.    MEDICATIONS  STANDING MEDICATIONS  acetaminophen     Tablet .. 650 milliGRAM(s) Oral every 8 hours  atorvastatin 20 milliGRAM(s) Oral at bedtime  chlorhexidine 2% Cloths 1 Application(s) Topical daily  dextrose 5%. 1000 milliLiter(s) IV Continuous <Continuous>  dextrose 5%. 1000 milliLiter(s) IV Continuous <Continuous>  dextrose 50% Injectable 12.5 Gram(s) IV Push once  dextrose 50% Injectable 25 Gram(s) IV Push once  dextrose 50% Injectable 25 Gram(s) IV Push once  glucagon  Injectable 1 milliGRAM(s) IntraMuscular once  heparin  Infusion.  Unit(s)/Hr IV Continuous <Continuous>  insulin lispro (ADMELOG) corrective regimen sliding scale   SubCutaneous three times a day before meals  metoprolol succinate ER 50 milliGRAM(s) Oral daily  polyethylene glycol 3350 17 Gram(s) Oral two times a day  senna 2 Tablet(s) Oral at bedtime    PRN MEDICATIONS  dextrose Oral Gel 15 Gram(s) Oral once PRN  heparin   Injectable 5000 Unit(s) IV Push every 6 hours PRN  heparin   Injectable 2500 Unit(s) IV Push every 6 hours PRN  oxycodone    5 mG/acetaminophen 325 mG 1 Tablet(s) Oral every 6 hours PRN    VITALS  T(F): 97.9 (09-06-24 @ 13:22), Max: 98.1 (09-05-24 @ 13:38)  HR: 59 (09-06-24 @ 13:22) (59 - 62)  BP: 119/67 (09-06-24 @ 13:22) (119/67 - 165/80)  RR: 18 (09-06-24 @ 13:22) (18 - 18)  SpO2: --  POCT Blood Glucose.: 131 mg/dL (09-06-24 @ 11:55)  POCT Blood Glucose.: 117 mg/dL (09-06-24 @ 08:10)  POCT Blood Glucose.: 140 mg/dL (09-05-24 @ 21:06)  POCT Blood Glucose.: 217 mg/dL (09-05-24 @ 18:18)    PHYSICAL EXAM  GENERAL  (  x) NAD, lying in bed comfortably     (  ) obtunded     (  ) lethargic     (  ) somnolent    HEAD  (  x) Atraumatic     (  ) hematoma     (  ) laceration (specify location:       )     NECK  ( x ) Supple     (  ) neck stiffness     (  ) nuchal rigidity     (  )  no JVD     (  ) JVD present ( -- cm)    HEART  Rate -->  (x  ) normal rate    (  ) bradycardic    (  ) tachycardic  Rhythm -->  (  ) regular    (  ) regularly irregular    (  ) irregularly irregular  Murmurs -->  (  ) normal s1/s2    (  ) systolic murmur    (  ) diastolic murmur    (  ) continuous murmur     (  ) S3 present    (  ) S4 present    LUNGS  (x  )Unlabored respirations     (  ) tachypnea  (  ) B/L air entry     (  ) decreased breath sounds in:  (location     )    (  ) no adventitious sound     (  ) crackles     (  ) wheezing      (  ) rhonchi      (specify location:       )  (  ) chest wall tenderness (specify location:       )    ABDOMEN  (  ) Soft     (  ) tense   |   (  ) nondistended     (x  ) distended   |   (  ) +BS     (  ) hypoactive bowel sounds     (  ) hyperactive bowel sounds  (  ) nontender     (  ) RUQ tenderness     (  ) RLQ tenderness     (  ) LLQ tenderness     (  ) epigastric tenderness     (  ) diffuse tenderness  (  ) Splenomegaly      (  ) Hepatomegaly      (  ) Jaundice     (  ) ecchymosis     EXTREMITIES  (  ) Normal     (  ) Rash     (  ) ecchymosis     (  ) varicose veins      (  ) pitting edema     (  ) non-pitting edema   (  ) ulceration     (  ) gangrene:     (location:     )    NERVOUS SYSTEM  ( x ) A&Ox3     (  ) confused     (  ) lethargic  CN II-XII:     (  ) Intact     (  ) focal deficits  (Specify:     )   Upper extremities:     (  ) strength X/5     (  ) focal deficit (specify:    )  Lower extremities:     (  ) strength  X/5    (  ) focal deficit (specify:    )    SKIN  (  ) No rashes or lesions     (  ) maculopapular rash     (  ) pustules     (  ) vesicles     (  ) ulcer     (  ) ecchymosis     (specify location:     )    (  ) Indwelling Fischer Catheter   Date insterted:    Reason (  ) Critical illness     (  ) urinary retention    (  ) Accurate Ins/Outs Monitoring     (  ) CMO patient    (  ) Central Line  Date inserted:  Location: (  ) Right IJ   (  ) Left IJ   (  ) Right Fem   (  ) Left Fem    (  ) SPC  (  ) pigtail  (  ) PEG tube  (  ) colostomy  (  ) jejunostomy  (  ) U-Dall    LABS             9.7    4.48  )-----------( 257      ( 09-06-24 @ 08:03 )             30.3     132  |  94  |  14  -------------------------<  94   09-06-24 @ 08:03  4.4  |  28  |  1.1    Ca      8.5     09-06-24 @ 08:03  Phos   3.9     09-06-24 @ 08:03  Mg     2.1     09-06-24 @ 08:03    TPro  6.0  /  Alb  3.2  /  TBili  0.4  /  DBili  x   /  AST  12  /  ALT  6   /  AlkPhos  61  /  GGT  x     09-06-24 @ 08:03    PTT - ( 09-06-24 @ 05:00 )  PTT:92.4 sec      Urinalysis Basic - ( 06 Sep 2024 08:03 )    Color: x / Appearance: x / SG: x / pH: x  Gluc: 94 mg/dL / Ketone: x  / Bili: x / Urobili: x   Blood: x / Protein: x / Nitrite: x   Leuk Esterase: x / RBC: x / WBC x   Sq Epi: x / Non Sq Epi: x / Bacteria: x

## 2024-09-06 NOTE — PROGRESS NOTE ADULT - ATTENDING COMMENTS
appreciate Heme Onc consult   it is correct that NO SURGERY of any kind was performed in [facility], simply an X lap   will need tissue bx I agree

## 2024-09-06 NOTE — CHART NOTE - NSCHARTNOTEFT_GEN_A_CORE
PGY2     Patient seen and evaluated at bedside with cousin Donna, who was unsure of type of prior abdominal surgery. States patient has been in town from [facility] for past two weeks. No recent unintended weight loss, change in appetite. Came to hospital for RLQ/right groin pain. Has had straining and constipation for past week only passing small amounts of BM. Denies hematochezia. Patient's family member unsure of what type of abdominal surgery she had in [facility], per Dr. Jefferson patient had an exploratory laparotomy for metastatic cancer (suspected colon in origin), however no final diagnosis available. Patient's family member unsure of the hospital records and will attempt to reach out to her  to obtain operative report. Upon admission found to have BLE DVTS. Per family member, no other personal h/o cancer or known family h/o cancer. Had not visited GYN in decades. No known abnormal GYN history. Denies any postmenopausal bleeding.     Vital Signs Last 24 Hrs  T(C): 36.6 (06 Sep 2024 13:22), Max: 36.7 (05 Sep 2024 19:50)  T(F): 97.9 (06 Sep 2024 13:22), Max: 98.1 (05 Sep 2024 19:50)  HR: 59 (06 Sep 2024 13:22) (59 - 62)  BP: 119/67 (06 Sep 2024 13:22) (119/67 - 165/80)  RR: 18 (06 Sep 2024 13:22) (18 - 18)  SpO2: --    General: AAOx3 (per patient's family member in [facility])   Abdomen: Soft, nontender, large ex-lap scar noted along abdomen, large 25cm, firm, nontender mass noted in lower abdomen.   Pelvic:  External genitalia - Normal  Vagina - No active bleeding, small amount of discharge noted, no masses  Cervix - visualized flush with vaginal wall  Recto-vaginal exam - no abnormal masses palpated (exam per Dr. Aguirre)   Uterus - Non-palpable  Adnexa - Non-palpable    Radiology  Recommended TVUS prior to endometrial biopsy. TVUS completed and read following above exam.     ACC: [id number] EXAM:  US TRANSVAGINAL   ORDERED BY: YESI FERNANDO     PROCEDURE DATE:  09/06/2024      INTERPRETATION:  CLINICAL INFORMATION: Assess pelvic mass seen on CT.    LMP: Postmenopausal.    COMPARISON: CT abdomen and pelvis 9/4/2024.    TECHNIQUE:  Endovaginal pelvic sonogram only. Color and Spectral Doppler was   performed.    FINDINGS/  IMPRESSION:    Large heterogeneous mass with measuring 22.6 x 16.9 x 20.3 cm within the   pelvic cavity. The mass obscures visualization of the uterus and ovaries.    --- End of Report ---    DENY SHARMA MD; Resident Radiologist  This document has been electronically signed.  BOB HATHAWAY MD; Attending Radiologist  This document has been electronically signed. Sep  6 2024 3:15PM    < end of copied text >    Dr. Aguirre at bedside for exam.  Will defer endometrial biopsy until tumor markers result. To be discussed with gyn-onc attending. PGY2     Patient seen and evaluated at bedside with cousin Donna, who was unsure of type of prior abdominal surgery. States patient has been in town from [facility] for past two weeks. No recent unintended weight loss, change in appetite. Came to hospital for RLQ/right groin pain. Has had straining and constipation for past week only passing small amounts of BM. Denies hematochezia. Patient's family member unsure of what type of abdominal surgery she had in [facility], per Dr. Jefferson patient had an exploratory laparotomy for metastatic cancer (suspected colon in origin), however no final diagnosis available. Patient's family member unsure of the hospital records and will attempt to reach out to her  to obtain operative report. Upon admission found to have BLE DVTS. Per family member, no other personal h/o cancer or known family h/o cancer. Had not visited GYN in decades. No known abnormal GYN history. Denies any postmenopausal bleeding.     Vital Signs Last 24 Hrs  T(C): 36.6 (06 Sep 2024 13:22), Max: 36.7 (05 Sep 2024 19:50)  T(F): 97.9 (06 Sep 2024 13:22), Max: 98.1 (05 Sep 2024 19:50)  HR: 59 (06 Sep 2024 13:22) (59 - 62)  BP: 119/67 (06 Sep 2024 13:22) (119/67 - 165/80)  RR: 18 (06 Sep 2024 13:22) (18 - 18)  SpO2: --    General: AAOx3 (per patient's family member in [facility])   Abdomen: Soft, nontender, large ex-lap scar noted along abdomen, large 25cm, firm, nontender mass noted in lower abdomen.   Pelvic:  External genitalia - Normal  Vagina - No active bleeding, small amount of discharge noted, no masses  Cervix - visualized flush with vaginal wall  Recto-vaginal exam - no abnormal masses palpated (exam per Dr. Aguirre)   Uterus - Non-palpable  Adnexa - Non-palpable    Radiology  Recommended TVUS prior to endometrial biopsy. TVUS completed and read following above exam.     ACC: [id number] EXAM:  US TRANSVAGINAL   ORDERED BY: YESI FERNANDO     PROCEDURE DATE:  09/06/2024      INTERPRETATION:  CLINICAL INFORMATION: Assess pelvic mass seen on CT.    LMP: Postmenopausal.    COMPARISON: CT abdomen and pelvis 9/4/2024.    TECHNIQUE:  Endovaginal pelvic sonogram only. Color and Spectral Doppler was   performed.    FINDINGS/  IMPRESSION:    Large heterogeneous mass with measuring 22.6 x 16.9 x 20.3 cm within the   pelvic cavity. The mass obscures visualization of the uterus and ovaries.    --- End of Report ---    DENY SHARMA MD; Resident Radiologist  This document has been electronically signed.  BOB HATHAWAY MD; Attending Radiologist  This document has been electronically signed. Sep  6 2024 3:15PM    < end of copied text >    Dr. Aguirre at bedside for exam.  Will defer endometrial biopsy until tumor markers result. To be discussed with gyn-onc attending.      ATTENDING ATTESTATION:  PT seen and exam performed with resident as above on 9/6/24 approximately 11AM.  Impression: Pelvic Mass  Plan for TV sono to evaluate endometrial thickness, and consult GYN onc.

## 2024-09-06 NOTE — CONSULT NOTE ADULT - SUBJECTIVE AND OBJECTIVE BOX
Hematology Consult Note    HPI:   79-year-old female with past medical history of prediabetes presents for right groin pain with radiation down anterior leg and constipation x 2 days.  Patient has arrived  from [facility] 1 week ago and has been staying with family who have noticed decreased appetite and complaints of right groin pain worsening when walking and decreased bowel movements despite utilizing prune juice and increasing fluids. Tylenol occasionally improves pain.   Patient was seen by her primary Dr Jefferson  last night and directed to the emergency department.  Patient's daughter Queta notes patient underwent a recent surgery to her right colon and [facility], unsure what was done or what was removed but is concerned that her constipation and new pain could be related to the surgery   Denies any  fevers chills nausea vomiting diarrhea no numbness/weakness    off note: As per primary Dr Jefferson, reviewed records from [facility], pt was found to have metastatic cancer, possibly colon as primary. Was informed needed to start chemo, however there was no chemo available there at the time.      In Ed on vitals   · BP Systolic	 174 mm Hg  · BP Diastolic	83 mm Hg  · Heart Rate	68 /min  afebrile on RA     On labs Hb 10.2 , neutrophil % 90.3 , Na 130 , potassium 5.2 hemolysed   On imaging  On  CT A/P :   Large multiloculated cystic mass with areas of soft tissue, measuring   24.7 x 12.5 x 22.3 cm, extending from the pelvis into the upper abdomen.    Findings are concerning for malignancy; felt to likely be gynecologic in   etiology, but this mass remains indeterminate.    Questionable filling defects in both the right and left common femoral   vein and deep venous thrombosis is not excluded.      Mild bilateral hydronephrosis    Indeterminate multiple fluid-filled/cystic lesions within the abdomen   measuring up to 6.5 cm, felt to likely be related to cystic metastatic   disease    Soft tissue density within the transverse colon adjacent to the splenic   flexure on image 52 series 2. Findings may be related to focal stool.   Underlying neoplasm cannot be excluded.    Subcutaneous nodule measuring 1.8 cm and subcentimeter sclerotic focus in   T12, metastatic disease is not excluded    Small right pleural effusion.    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    As per Ed the prelim Duplex BLE report showed  DVTs from gastroc/popliteal/peroneal. Not including femoral        admitted for further workup (04 Sep 2024 20:27)      Allergies    No Known Allergies    Intolerances        MEDICATIONS  (STANDING):  acetaminophen     Tablet .. 650 milliGRAM(s) Oral every 8 hours  atorvastatin 20 milliGRAM(s) Oral at bedtime  chlorhexidine 2% Cloths 1 Application(s) Topical daily  dextrose 5%. 1000 milliLiter(s) (50 mL/Hr) IV Continuous <Continuous>  dextrose 5%. 1000 milliLiter(s) (100 mL/Hr) IV Continuous <Continuous>  dextrose 50% Injectable 12.5 Gram(s) IV Push once  dextrose 50% Injectable 25 Gram(s) IV Push once  dextrose 50% Injectable 25 Gram(s) IV Push once  glucagon  Injectable 1 milliGRAM(s) IntraMuscular once  heparin  Infusion.  Unit(s)/Hr (11 mL/Hr) IV Continuous <Continuous>  insulin lispro (ADMELOG) corrective regimen sliding scale   SubCutaneous three times a day before meals  metoprolol succinate ER 50 milliGRAM(s) Oral daily  polyethylene glycol 3350 17 Gram(s) Oral two times a day  senna 2 Tablet(s) Oral at bedtime    MEDICATIONS  (PRN):  dextrose Oral Gel 15 Gram(s) Oral once PRN Blood Glucose LESS THAN 70 milliGRAM(s)/deciliter  heparin   Injectable 5000 Unit(s) IV Push every 6 hours PRN For aPTT less than 40  heparin   Injectable 2500 Unit(s) IV Push every 6 hours PRN For aPTT between 40 - 57  oxycodone    5 mG/acetaminophen 325 mG 1 Tablet(s) Oral every 6 hours PRN Severe Pain (7 - 10)      PAST MEDICAL & SURGICAL HISTORY:      FAMILY HISTORY:      SOCIAL HISTORY: No EtOH, no tobacco    REVIEW OF SYSTEMS:    CONSTITUTIONAL: No weakness, fevers or chills  EYES/ENT: No visual changes;  No vertigo or throat pain   NECK: No pain or stiffness  RESPIRATORY: No cough, wheezing, hemoptysis; No shortness of breath  CARDIOVASCULAR: No chest pain or palpitations  GASTROINTESTINAL: No abdominal or epigastric pain. No nausea, vomiting, or hematemesis; No diarrhea or constipation. No melena or hematochezia.  GENITOURINARY: No dysuria, frequency or hematuria  NEUROLOGICAL: No numbness or weakness  SKIN: No itching, burning, rashes, or lesions   All other review of systems is negative unless indicated above.        T(F): 97.3 (09-06-24 @ 06:09), Max: 98.1 (09-05-24 @ 13:38)  HR: 62 (09-06-24 @ 06:09)  BP: 165/80 (09-06-24 @ 06:09)  RR: 18 (09-06-24 @ 06:09)  SpO2: --  Wt(kg): --    GENERAL: NAD, well-developed  HEAD:  Atraumatic, Normocephalic  EYES: EOMI, PERRLA, conjunctiva and sclera clear  NECK: Supple, No JVD  CHEST/LUNG: Clear to auscultation bilaterally; No wheeze  HEART: Regular rate and rhythm; No murmurs, rubs, or gallops  ABDOMEN: Soft, Nontender, Nondistended; Bowel sounds present  EXTREMITIES:  2+ Peripheral Pulses, No clubbing, cyanosis, or edema  NEUROLOGY: non-focal  SKIN: No rashes or lesions                          9.7    4.48  )-----------( 257      ( 06 Sep 2024 08:03 )             30.3       09-06    132<L>  |  94<L>  |  14  ----------------------------<  94  4.4   |  28  |  1.1    Ca    8.5      06 Sep 2024 08:03  Phos  3.9     09-06  Mg     2.1     09-06    TPro  6.0  /  Alb  3.2<L>  /  TBili  0.4  /  DBili  x   /  AST  12  /  ALT  6   /  AlkPhos  61  09-06      Phosphorus: 3.9 mg/dL (09-06 @ 08:03)  Magnesium: 2.1 mg/dL (09-06 @ 08:03)       Hematology Consult Note    HPI:   79-year-old female with past medical history of prediabetes presents for right groin pain with radiation down anterior leg and constipation x 2 days.  Patient has arrived  from [facility] 1 week ago and has been staying with family who have noticed decreased appetite and complaints of right groin pain worsening when walking and decreased bowel movements despite utilizing prune juice and increasing fluids. Tylenol occasionally improves pain.   Patient was seen by her primary Dr Jefferson  last night and directed to the emergency department.  Patient's daughter Queta notes patient underwent a recent surgery to her right colon and [facility], unsure what was done or what was removed but is concerned that her constipation and new pain could be related to the surgery   Denies any  fevers chills nausea vomiting diarrhea no numbness/weakness    off note: As per primary Dr Jefferson, reviewed records from [facility], pt was found to have metastatic cancer, possibly colon as primary. Was informed needed to start chemo, however there was no chemo available there at the time.      In Ed on vitals   · BP Systolic	 174 mm Hg  · BP Diastolic	83 mm Hg  · Heart Rate	68 /min  afebrile on RA     On labs Hb 10.2 , neutrophil % 90.3 , Na 130 , potassium 5.2 hemolysed   On imaging  On  CT A/P :   Large multiloculated cystic mass with areas of soft tissue, measuring   24.7 x 12.5 x 22.3 cm, extending from the pelvis into the upper abdomen.    Findings are concerning for malignancy; felt to likely be gynecologic in   etiology, but this mass remains indeterminate.    Questionable filling defects in both the right and left common femoral   vein and deep venous thrombosis is not excluded.      Mild bilateral hydronephrosis    Indeterminate multiple fluid-filled/cystic lesions within the abdomen   measuring up to 6.5 cm, felt to likely be related to cystic metastatic   disease    Soft tissue density within the transverse colon adjacent to the splenic   flexure on image 52 series 2. Findings may be related to focal stool.   Underlying neoplasm cannot be excluded.    Subcutaneous nodule measuring 1.8 cm and subcentimeter sclerotic focus in   T12, metastatic disease is not excluded    Small right pleural effusion.    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    As per Ed the prelim Duplex BLE report showed  DVTs from gastroc/popliteal/peroneal. Not including femoral        admitted for further workup (04 Sep 2024 20:27)      Allergies    No Known Allergies    Intolerances        MEDICATIONS  (STANDING):  acetaminophen     Tablet .. 650 milliGRAM(s) Oral every 8 hours  atorvastatin 20 milliGRAM(s) Oral at bedtime  chlorhexidine 2% Cloths 1 Application(s) Topical daily  dextrose 5%. 1000 milliLiter(s) (50 mL/Hr) IV Continuous <Continuous>  dextrose 5%. 1000 milliLiter(s) (100 mL/Hr) IV Continuous <Continuous>  dextrose 50% Injectable 12.5 Gram(s) IV Push once  dextrose 50% Injectable 25 Gram(s) IV Push once  dextrose 50% Injectable 25 Gram(s) IV Push once  glucagon  Injectable 1 milliGRAM(s) IntraMuscular once  heparin  Infusion.  Unit(s)/Hr (11 mL/Hr) IV Continuous <Continuous>  insulin lispro (ADMELOG) corrective regimen sliding scale   SubCutaneous three times a day before meals  metoprolol succinate ER 50 milliGRAM(s) Oral daily  polyethylene glycol 3350 17 Gram(s) Oral two times a day  senna 2 Tablet(s) Oral at bedtime    MEDICATIONS  (PRN):  dextrose Oral Gel 15 Gram(s) Oral once PRN Blood Glucose LESS THAN 70 milliGRAM(s)/deciliter  heparin   Injectable 5000 Unit(s) IV Push every 6 hours PRN For aPTT less than 40  heparin   Injectable 2500 Unit(s) IV Push every 6 hours PRN For aPTT between 40 - 57  oxycodone    5 mG/acetaminophen 325 mG 1 Tablet(s) Oral every 6 hours PRN Severe Pain (7 - 10)      PAST MEDICAL & SURGICAL HISTORY:      FAMILY HISTORY:      SOCIAL HISTORY: No EtOH, no tobacco    REVIEW OF SYSTEMS:    CONSTITUTIONAL: No weakness, fevers or chills  EYES/ENT: No visual changes;  No vertigo or throat pain   NECK: No pain or stiffness  RESPIRATORY: No cough, wheezing, hemoptysis; No shortness of breath  CARDIOVASCULAR: No chest pain or palpitations  GASTROINTESTINAL: No abdominal or epigastric pain. No nausea, vomiting, or hematemesis; No diarrhea or constipation. No melena or hematochezia.  GENITOURINARY: No dysuria, frequency or hematuria  NEUROLOGICAL: No numbness or weakness  SKIN: No itching, burning, rashes, or lesions   All other review of systems is negative unless indicated above.        T(F): 97.3 (09-06-24 @ 06:09), Max: 98.1 (09-05-24 @ 13:38)  HR: 62 (09-06-24 @ 06:09)  BP: 165/80 (09-06-24 @ 06:09)  RR: 18 (09-06-24 @ 06:09)  SpO2: --  Wt(kg): --                            9.7    4.48  )-----------( 257      ( 06 Sep 2024 08:03 )             30.3       09-06    132<L>  |  94<L>  |  14  ----------------------------<  94  4.4   |  28  |  1.1    Ca    8.5      06 Sep 2024 08:03  Phos  3.9     09-06  Mg     2.1     09-06    TPro  6.0  /  Alb  3.2<L>  /  TBili  0.4  /  DBili  x   /  AST  12  /  ALT  6   /  AlkPhos  61  09-06      Phosphorus: 3.9 mg/dL (09-06 @ 08:03)  Magnesium: 2.1 mg/dL (09-06 @ 08:03)    < from: CT Abdomen and Pelvis w/ Oral Cont and w/ IV Cont (09.04.24 @ 15:17) >  Large multiloculated cystic mass with areas of soft tissue, measuring   24.7 x 12.5 x 22.3 cm, extending from the pelvis into the upper abdomen.    Findings are concerning for malignancy; felt to likely be gynecologic in   etiology, but this mass remains indeterminate.    Questionable filling defects in both the right and left common femoral   vein and deep venous thrombosis is not excluded.    Further evaluation with ultrasound is recommended.    Mild bilateral hydronephrosis    Indeterminate multiple fluid-filled/cystic lesions within the abdomen   measuring up to 6.5 cm, felt to likely be related to cystic metastatic   disease    Soft tissue density within the transverse colon adjacent to the splenic   flexure on image 52 series 2. Findings may be related to focal stool.   Underlying neoplasm cannot be excluded.    Subcutaneous nodule measuring 1.8 cm and subcentimeter sclerotic focus in   T12, metastatic disease is not excluded    Small rightpleural effusion.    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    Findings discussed with PRAMOD Nunes on 9/4/2024 at 5:59 PM with read back    < end of copied text >     Hematology Consult Note    HPI:   79-year-old female with past medical history of prediabetes presents for right groin pain with radiation down anterior leg and constipation x 2 days.  Patient has arrived  from [facility] 1 week ago and has been staying with family who have noticed decreased appetite and complaints of right groin pain worsening when walking and decreased bowel movements despite utilizing prune juice and increasing fluids. Tylenol occasionally improves pain.   Patient was seen by her primary Dr Jeffesron  last night and directed to the emergency department.  Patient's daughter Queta notes patient underwent a recent surgery to her right colon and [facility], unsure what was done or what was removed but is concerned that her constipation and new pain could be related to the surgery   Denies any  fevers chills nausea vomiting diarrhea no numbness/weakness    off note: As per primary Dr Jefferson, reviewed records from [facility], pt was found to have metastatic cancer, possibly colon as primary. Was informed needed to start chemo, however there was no chemo available there at the time.      In Ed on vitals   · BP Systolic	 174 mm Hg  · BP Diastolic	83 mm Hg  · Heart Rate	68 /min  afebrile on RA     On labs Hb 10.2 , neutrophil % 90.3 , Na 130 , potassium 5.2 hemolysed   On imaging  On  CT A/P :   Large multiloculated cystic mass with areas of soft tissue, measuring   24.7 x 12.5 x 22.3 cm, extending from the pelvis into the upper abdomen.    Findings are concerning for malignancy; felt to likely be gynecologic in   etiology, but this mass remains indeterminate.    Questionable filling defects in both the right and left common femoral   vein and deep venous thrombosis is not excluded.      Mild bilateral hydronephrosis    Indeterminate multiple fluid-filled/cystic lesions within the abdomen   measuring up to 6.5 cm, felt to likely be related to cystic metastatic   disease    Soft tissue density within the transverse colon adjacent to the splenic   flexure on image 52 series 2. Findings may be related to focal stool.   Underlying neoplasm cannot be excluded.    Subcutaneous nodule measuring 1.8 cm and subcentimeter sclerotic focus in   T12, metastatic disease is not excluded    Small right pleural effusion.    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    As per Ed the prelim Duplex BLE report showed  DVTs from gastroc/popliteal/peroneal. Not including femoral        admitted for further workup (04 Sep 2024 20:27)      Allergies    No Known Allergies    Intolerances        MEDICATIONS  (STANDING):  acetaminophen     Tablet .. 650 milliGRAM(s) Oral every 8 hours  atorvastatin 20 milliGRAM(s) Oral at bedtime  chlorhexidine 2% Cloths 1 Application(s) Topical daily  dextrose 5%. 1000 milliLiter(s) (50 mL/Hr) IV Continuous <Continuous>  dextrose 5%. 1000 milliLiter(s) (100 mL/Hr) IV Continuous <Continuous>  dextrose 50% Injectable 12.5 Gram(s) IV Push once  dextrose 50% Injectable 25 Gram(s) IV Push once  dextrose 50% Injectable 25 Gram(s) IV Push once  glucagon  Injectable 1 milliGRAM(s) IntraMuscular once  heparin  Infusion.  Unit(s)/Hr (11 mL/Hr) IV Continuous <Continuous>  insulin lispro (ADMELOG) corrective regimen sliding scale   SubCutaneous three times a day before meals  metoprolol succinate ER 50 milliGRAM(s) Oral daily  polyethylene glycol 3350 17 Gram(s) Oral two times a day  senna 2 Tablet(s) Oral at bedtime    MEDICATIONS  (PRN):  dextrose Oral Gel 15 Gram(s) Oral once PRN Blood Glucose LESS THAN 70 milliGRAM(s)/deciliter  heparin   Injectable 5000 Unit(s) IV Push every 6 hours PRN For aPTT less than 40  heparin   Injectable 2500 Unit(s) IV Push every 6 hours PRN For aPTT between 40 - 57  oxycodone    5 mG/acetaminophen 325 mG 1 Tablet(s) Oral every 6 hours PRN Severe Pain (7 - 10)      PAST MEDICAL & SURGICAL HISTORY:      FAMILY HISTORY:      SOCIAL HISTORY: No EtOH, no tobacco    REVIEW OF SYSTEMS:  No pain      T(F): 97.3 (09-06-24 @ 06:09), Max: 98.1 (09-05-24 @ 13:38)  HR: 62 (09-06-24 @ 06:09)  BP: 165/80 (09-06-24 @ 06:09)  RR: 18 (09-06-24 @ 06:09)  SpO2: --  Wt(kg): --                            9.7    4.48  )-----------( 257      ( 06 Sep 2024 08:03 )             30.3       09-06    132<L>  |  94<L>  |  14  ----------------------------<  94  4.4   |  28  |  1.1    Ca    8.5      06 Sep 2024 08:03  Phos  3.9     09-06  Mg     2.1     09-06    TPro  6.0  /  Alb  3.2<L>  /  TBili  0.4  /  DBili  x   /  AST  12  /  ALT  6   /  AlkPhos  61  09-06      Phosphorus: 3.9 mg/dL (09-06 @ 08:03)  Magnesium: 2.1 mg/dL (09-06 @ 08:03)    < from: CT Abdomen and Pelvis w/ Oral Cont and w/ IV Cont (09.04.24 @ 15:17) >  Large multiloculated cystic mass with areas of soft tissue, measuring   24.7 x 12.5 x 22.3 cm, extending from the pelvis into the upper abdomen.    Findings are concerning for malignancy; felt to likely be gynecologic in   etiology, but this mass remains indeterminate.    Questionable filling defects in both the right and left common femoral   vein and deep venous thrombosis is not excluded.    Further evaluation with ultrasound is recommended.    Mild bilateral hydronephrosis    Indeterminate multiple fluid-filled/cystic lesions within the abdomen   measuring up to 6.5 cm, felt to likely be related to cystic metastatic   disease    Soft tissue density within the transverse colon adjacent to the splenic   flexure on image 52 series 2. Findings may be related to focal stool.   Underlying neoplasm cannot be excluded.    Subcutaneous nodule measuring 1.8 cm and subcentimeter sclerotic focus in   T12, metastatic disease is not excluded    Small rightpleural effusion.    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    Findings discussed with PRAMOD Nunes on 9/4/2024 at 5:59 PM with read back    < end of copied text >     Hematology Consult Note    HPI:   79-year-old female with past medical history of prediabetes presents for right groin pain with radiation down anterior leg and constipation x 2 days.  Patient has arrived  from [facility] 1 week ago and has been staying with family who have noticed decreased appetite and complaints of right groin pain worsening when walking and decreased bowel movements despite utilizing prune juice and increasing fluids. Tylenol occasionally improves pain.   Patient was seen by her primary Dr Jefferson  last night and directed to the emergency department.  Patient's daughter Queta notes patient underwent a recent surgery to her right colon and [facility], unsure what was done or what was removed but is concerned that her constipation and new pain could be related to the surgery   Denies any  fevers chills nausea vomiting diarrhea no numbness/weakness    off note: As per primary Dr Jefferson, reviewed records from [facility], pt was found to have metastatic cancer, possibly colon as primary. Was informed needed to start chemo, however there was no chemo available there at the time.      In Ed on vitals   · BP Systolic	 174 mm Hg  · BP Diastolic	83 mm Hg  · Heart Rate	68 /min  afebrile on RA     On labs Hb 10.2 , neutrophil % 90.3 , Na 130 , potassium 5.2 hemolysed   On imaging  On  CT A/P :   Large multiloculated cystic mass with areas of soft tissue, measuring   24.7 x 12.5 x 22.3 cm, extending from the pelvis into the upper abdomen.    Findings are concerning for malignancy; felt to likely be gynecologic in   etiology, but this mass remains indeterminate.    Questionable filling defects in both the right and left common femoral   vein and deep venous thrombosis is not excluded.      Mild bilateral hydronephrosis    Indeterminate multiple fluid-filled/cystic lesions within the abdomen   measuring up to 6.5 cm, felt to likely be related to cystic metastatic   disease    Soft tissue density within the transverse colon adjacent to the splenic   flexure on image 52 series 2. Findings may be related to focal stool.   Underlying neoplasm cannot be excluded.    Subcutaneous nodule measuring 1.8 cm and subcentimeter sclerotic focus in   T12, metastatic disease is not excluded    Small right pleural effusion.    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    As per Ed the prelim Duplex BLE report showed  DVTs from gastroc/popliteal/peroneal. Not including femoral        admitted for further workup (04 Sep 2024 20:27)      Allergies    No Known Allergies    Intolerances        MEDICATIONS  (STANDING):  acetaminophen     Tablet .. 650 milliGRAM(s) Oral every 8 hours  atorvastatin 20 milliGRAM(s) Oral at bedtime  chlorhexidine 2% Cloths 1 Application(s) Topical daily  dextrose 5%. 1000 milliLiter(s) (50 mL/Hr) IV Continuous <Continuous>  dextrose 5%. 1000 milliLiter(s) (100 mL/Hr) IV Continuous <Continuous>  dextrose 50% Injectable 12.5 Gram(s) IV Push once  dextrose 50% Injectable 25 Gram(s) IV Push once  dextrose 50% Injectable 25 Gram(s) IV Push once  glucagon  Injectable 1 milliGRAM(s) IntraMuscular once  heparin  Infusion.  Unit(s)/Hr (11 mL/Hr) IV Continuous <Continuous>  insulin lispro (ADMELOG) corrective regimen sliding scale   SubCutaneous three times a day before meals  metoprolol succinate ER 50 milliGRAM(s) Oral daily  polyethylene glycol 3350 17 Gram(s) Oral two times a day  senna 2 Tablet(s) Oral at bedtime    MEDICATIONS  (PRN):  dextrose Oral Gel 15 Gram(s) Oral once PRN Blood Glucose LESS THAN 70 milliGRAM(s)/deciliter  heparin   Injectable 5000 Unit(s) IV Push every 6 hours PRN For aPTT less than 40  heparin   Injectable 2500 Unit(s) IV Push every 6 hours PRN For aPTT between 40 - 57  oxycodone    5 mG/acetaminophen 325 mG 1 Tablet(s) Oral every 6 hours PRN Severe Pain (7 - 10)      PAST MEDICAL & SURGICAL HISTORY:      FAMILY HISTORY:      SOCIAL HISTORY: No EtOH, no tobacco    REVIEW OF SYSTEMS:  No pain      T(F): 97.3 (09-06-24 @ 06:09), Max: 98.1 (09-05-24 @ 13:38)  HR: 62 (09-06-24 @ 06:09)  BP: 165/80 (09-06-24 @ 06:09)  RR: 18 (09-06-24 @ 06:09)  SpO2: --  Wt(kg): --    Physical examination:  Ao x 2 no acute distress  Abdominal Exam: distended, midline scar noted, healed, just right to the incision is a 1 cm nodule  Chest: B/l clear  Hear: Regular heart sounds                          9.7    4.48  )-----------( 257      ( 06 Sep 2024 08:03 )             30.3       09-06    132<L>  |  94<L>  |  14  ----------------------------<  94  4.4   |  28  |  1.1    Ca    8.5      06 Sep 2024 08:03  Phos  3.9     09-06  Mg     2.1     09-06    TPro  6.0  /  Alb  3.2<L>  /  TBili  0.4  /  DBili  x   /  AST  12  /  ALT  6   /  AlkPhos  61  09-06      Phosphorus: 3.9 mg/dL (09-06 @ 08:03)  Magnesium: 2.1 mg/dL (09-06 @ 08:03)    < from: CT Abdomen and Pelvis w/ Oral Cont and w/ IV Cont (09.04.24 @ 15:17) >  Large multiloculated cystic mass with areas of soft tissue, measuring   24.7 x 12.5 x 22.3 cm, extending from the pelvis into the upper abdomen.    Findings are concerning for malignancy; felt to likely be gynecologic in   etiology, but this mass remains indeterminate.    Questionable filling defects in both the right and left common femoral   vein and deep venous thrombosis is not excluded.    Further evaluation with ultrasound is recommended.    Mild bilateral hydronephrosis    Indeterminate multiple fluid-filled/cystic lesions within the abdomen   measuring up to 6.5 cm, felt to likely be related to cystic metastatic   disease    Soft tissue density within the transverse colon adjacent to the splenic   flexure on image 52 series 2. Findings may be related to focal stool.   Underlying neoplasm cannot be excluded.    Subcutaneous nodule measuring 1.8 cm and subcentimeter sclerotic focus in   T12, metastatic disease is not excluded    Small rightpleural effusion.    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    Findings discussed with PRAMOD Nunes on 9/4/2024 at 5:59 PM with read back    < end of copied text >

## 2024-09-07 LAB
ALBUMIN SERPL ELPH-MCNC: 3.3 G/DL — LOW (ref 3.5–5.2)
ALP SERPL-CCNC: 63 U/L — SIGNIFICANT CHANGE UP (ref 30–115)
ALT FLD-CCNC: <5 U/L — SIGNIFICANT CHANGE UP (ref 0–41)
ANION GAP SERPL CALC-SCNC: 11 MMOL/L — SIGNIFICANT CHANGE UP (ref 7–14)
APTT BLD: 60.7 SEC — HIGH (ref 27–39.2)
APTT BLD: 66.2 SEC — HIGH (ref 27–39.2)
APTT BLD: >200 SEC — CRITICAL HIGH (ref 27–39.2)
AST SERPL-CCNC: 11 U/L — SIGNIFICANT CHANGE UP (ref 0–41)
BASOPHILS # BLD AUTO: 0 K/UL — SIGNIFICANT CHANGE UP (ref 0–0.2)
BASOPHILS NFR BLD AUTO: 0 % — SIGNIFICANT CHANGE UP (ref 0–1)
BILIRUB SERPL-MCNC: 0.4 MG/DL — SIGNIFICANT CHANGE UP (ref 0.2–1.2)
BUN SERPL-MCNC: 15 MG/DL — SIGNIFICANT CHANGE UP (ref 10–20)
CALCIUM SERPL-MCNC: 8.2 MG/DL — LOW (ref 8.4–10.5)
CANCER AG125 SERPL-ACNC: 34 U/ML — SIGNIFICANT CHANGE UP
CANCER AG19-9 SERPL-ACNC: 30 U/ML — SIGNIFICANT CHANGE UP
CEA SERPL-MCNC: 4.8 NG/ML — HIGH (ref 0–3.8)
CHLORIDE SERPL-SCNC: 97 MMOL/L — LOW (ref 98–110)
CO2 SERPL-SCNC: 25 MMOL/L — SIGNIFICANT CHANGE UP (ref 17–32)
CREAT SERPL-MCNC: 1.2 MG/DL — SIGNIFICANT CHANGE UP (ref 0.7–1.5)
EGFR: 47 ML/MIN/1.73M2 — LOW
EOSINOPHIL # BLD AUTO: 0 K/UL — SIGNIFICANT CHANGE UP (ref 0–0.7)
EOSINOPHIL NFR BLD AUTO: 0 % — SIGNIFICANT CHANGE UP (ref 0–8)
GLUCOSE BLDC GLUCOMTR-MCNC: 113 MG/DL — HIGH (ref 70–99)
GLUCOSE BLDC GLUCOMTR-MCNC: 131 MG/DL — HIGH (ref 70–99)
GLUCOSE BLDC GLUCOMTR-MCNC: 145 MG/DL — HIGH (ref 70–99)
GLUCOSE BLDC GLUCOMTR-MCNC: 94 MG/DL — SIGNIFICANT CHANGE UP (ref 70–99)
GLUCOSE SERPL-MCNC: 82 MG/DL — SIGNIFICANT CHANGE UP (ref 70–99)
HCT VFR BLD CALC: 28.6 % — LOW (ref 37–47)
HGB BLD-MCNC: 9.3 G/DL — LOW (ref 12–16)
IMM GRANULOCYTES NFR BLD AUTO: 0.3 % — SIGNIFICANT CHANGE UP (ref 0.1–0.3)
IRON SATN MFR SERPL: 20 % — SIGNIFICANT CHANGE UP (ref 15–50)
IRON SATN MFR SERPL: 36 UG/DL — SIGNIFICANT CHANGE UP (ref 35–150)
LDH SERPL L TO P-CCNC: 130 — SIGNIFICANT CHANGE UP (ref 50–242)
LYMPHOCYTES # BLD AUTO: 0.76 K/UL — LOW (ref 1.2–3.4)
LYMPHOCYTES # BLD AUTO: 22.8 % — SIGNIFICANT CHANGE UP (ref 20.5–51.1)
MAGNESIUM SERPL-MCNC: 2 MG/DL — SIGNIFICANT CHANGE UP (ref 1.8–2.4)
MCHC RBC-ENTMCNC: 28.4 PG — SIGNIFICANT CHANGE UP (ref 27–31)
MCHC RBC-ENTMCNC: 32.5 G/DL — SIGNIFICANT CHANGE UP (ref 32–37)
MCV RBC AUTO: 87.2 FL — SIGNIFICANT CHANGE UP (ref 81–99)
MONOCYTES # BLD AUTO: 0.34 K/UL — SIGNIFICANT CHANGE UP (ref 0.1–0.6)
MONOCYTES NFR BLD AUTO: 10.2 % — HIGH (ref 1.7–9.3)
NEUTROPHILS # BLD AUTO: 2.22 K/UL — SIGNIFICANT CHANGE UP (ref 1.4–6.5)
NEUTROPHILS NFR BLD AUTO: 66.7 % — SIGNIFICANT CHANGE UP (ref 42.2–75.2)
NRBC # BLD: 0 /100 WBCS — SIGNIFICANT CHANGE UP (ref 0–0)
PHOSPHATE SERPL-MCNC: 4 MG/DL — SIGNIFICANT CHANGE UP (ref 2.1–4.9)
PLATELET # BLD AUTO: 232 K/UL — SIGNIFICANT CHANGE UP (ref 130–400)
PMV BLD: 11.2 FL — HIGH (ref 7.4–10.4)
POTASSIUM SERPL-MCNC: 4.6 MMOL/L — SIGNIFICANT CHANGE UP (ref 3.5–5)
POTASSIUM SERPL-SCNC: 4.6 MMOL/L — SIGNIFICANT CHANGE UP (ref 3.5–5)
PROT SERPL-MCNC: 5.7 G/DL — LOW (ref 6–8)
RBC # BLD: 3.28 M/UL — LOW (ref 4.2–5.4)
RBC # BLD: 3.28 M/UL — LOW (ref 4.2–5.4)
RBC # FLD: 17.2 % — HIGH (ref 11.5–14.5)
RETICS #: 52.8 K/UL — SIGNIFICANT CHANGE UP (ref 25–125)
RETICS/RBC NFR: 1.6 % — HIGH (ref 0.5–1.5)
SODIUM SERPL-SCNC: 133 MMOL/L — LOW (ref 135–146)
TIBC SERPL-MCNC: 178 UG/DL — LOW (ref 220–430)
UIBC SERPL-MCNC: 142 UG/DL — SIGNIFICANT CHANGE UP (ref 110–370)
WBC # BLD: 3.33 K/UL — LOW (ref 4.8–10.8)
WBC # FLD AUTO: 3.33 K/UL — LOW (ref 4.8–10.8)

## 2024-09-07 RX ADMIN — ACETAMINOPHEN 650 MILLIGRAM(S): 325 TABLET ORAL at 06:20

## 2024-09-07 RX ADMIN — POLYETHYLENE GLYCOL 3350 17 GRAM(S): 17 POWDER, FOR SOLUTION ORAL at 06:20

## 2024-09-07 RX ADMIN — POLYETHYLENE GLYCOL 3350 17 GRAM(S): 17 POWDER, FOR SOLUTION ORAL at 13:07

## 2024-09-07 RX ADMIN — ACETAMINOPHEN 650 MILLIGRAM(S): 325 TABLET ORAL at 22:15

## 2024-09-07 RX ADMIN — ACETAMINOPHEN 650 MILLIGRAM(S): 325 TABLET ORAL at 13:08

## 2024-09-07 RX ADMIN — ACETAMINOPHEN 650 MILLIGRAM(S): 325 TABLET ORAL at 13:43

## 2024-09-07 RX ADMIN — ACETAMINOPHEN 650 MILLIGRAM(S): 325 TABLET ORAL at 07:14

## 2024-09-07 RX ADMIN — CHLORHEXIDINE GLUCONATE 1 APPLICATION(S): 40 SOLUTION TOPICAL at 13:09

## 2024-09-07 RX ADMIN — Medication 1100 UNIT(S)/HR: at 22:30

## 2024-09-07 RX ADMIN — Medication 1100 UNIT(S)/HR: at 07:26

## 2024-09-07 RX ADMIN — Medication 1100 UNIT(S)/HR: at 20:33

## 2024-09-07 RX ADMIN — ACETAMINOPHEN 650 MILLIGRAM(S): 325 TABLET ORAL at 21:06

## 2024-09-07 RX ADMIN — Medication 1100 UNIT(S)/HR: at 15:14

## 2024-09-07 RX ADMIN — Medication 0 UNIT(S)/HR: at 05:43

## 2024-09-07 RX ADMIN — Medication 1100 UNIT(S)/HR: at 06:57

## 2024-09-07 RX ADMIN — Medication 1300 UNIT(S)/HR: at 04:03

## 2024-09-07 RX ADMIN — Medication 20 MILLIGRAM(S): at 21:06

## 2024-09-08 LAB
ALBUMIN SERPL ELPH-MCNC: 3.1 G/DL — LOW (ref 3.5–5.2)
ALP SERPL-CCNC: 60 U/L — SIGNIFICANT CHANGE UP (ref 30–115)
ALT FLD-CCNC: 6 U/L — SIGNIFICANT CHANGE UP (ref 0–41)
ANION GAP SERPL CALC-SCNC: 10 MMOL/L — SIGNIFICANT CHANGE UP (ref 7–14)
APTT BLD: 142.1 SEC — CRITICAL HIGH (ref 27–39.2)
APTT BLD: 60.9 SEC — HIGH (ref 27–39.2)
APTT BLD: 80.8 SEC — CRITICAL HIGH (ref 27–39.2)
AST SERPL-CCNC: 12 U/L — SIGNIFICANT CHANGE UP (ref 0–41)
BASOPHILS # BLD AUTO: 0 K/UL — SIGNIFICANT CHANGE UP (ref 0–0.2)
BASOPHILS NFR BLD AUTO: 0 % — SIGNIFICANT CHANGE UP (ref 0–1)
BILIRUB SERPL-MCNC: 0.3 MG/DL — SIGNIFICANT CHANGE UP (ref 0.2–1.2)
BUN SERPL-MCNC: 13 MG/DL — SIGNIFICANT CHANGE UP (ref 10–20)
CALCIUM SERPL-MCNC: 8.2 MG/DL — LOW (ref 8.4–10.5)
CHLORIDE SERPL-SCNC: 98 MMOL/L — SIGNIFICANT CHANGE UP (ref 98–110)
CO2 SERPL-SCNC: 24 MMOL/L — SIGNIFICANT CHANGE UP (ref 17–32)
CREAT SERPL-MCNC: 1.1 MG/DL — SIGNIFICANT CHANGE UP (ref 0.7–1.5)
EGFR: 52 ML/MIN/1.73M2 — LOW
EOSINOPHIL # BLD AUTO: 0 K/UL — SIGNIFICANT CHANGE UP (ref 0–0.7)
EOSINOPHIL NFR BLD AUTO: 0 % — SIGNIFICANT CHANGE UP (ref 0–8)
FERRITIN SERPL-MCNC: 338 NG/ML — HIGH (ref 13–330)
FOLATE SERPL-MCNC: 4.1 NG/ML — LOW
GLUCOSE BLDC GLUCOMTR-MCNC: 118 MG/DL — HIGH (ref 70–99)
GLUCOSE BLDC GLUCOMTR-MCNC: 126 MG/DL — HIGH (ref 70–99)
GLUCOSE BLDC GLUCOMTR-MCNC: 99 MG/DL — SIGNIFICANT CHANGE UP (ref 70–99)
GLUCOSE SERPL-MCNC: 86 MG/DL — SIGNIFICANT CHANGE UP (ref 70–99)
HAPTOGLOB SERPL-MCNC: 224 MG/DL — HIGH (ref 34–200)
HCT VFR BLD CALC: 27.1 % — LOW (ref 37–47)
HGB BLD-MCNC: 9 G/DL — LOW (ref 12–16)
IMM GRANULOCYTES NFR BLD AUTO: 0.5 % — HIGH (ref 0.1–0.3)
LYMPHOCYTES # BLD AUTO: 0.89 K/UL — LOW (ref 1.2–3.4)
LYMPHOCYTES # BLD AUTO: 24.1 % — SIGNIFICANT CHANGE UP (ref 20.5–51.1)
MAGNESIUM SERPL-MCNC: 2 MG/DL — SIGNIFICANT CHANGE UP (ref 1.8–2.4)
MCHC RBC-ENTMCNC: 28.5 PG — SIGNIFICANT CHANGE UP (ref 27–31)
MCHC RBC-ENTMCNC: 33.2 G/DL — SIGNIFICANT CHANGE UP (ref 32–37)
MCV RBC AUTO: 85.8 FL — SIGNIFICANT CHANGE UP (ref 81–99)
MONOCYTES # BLD AUTO: 0.46 K/UL — SIGNIFICANT CHANGE UP (ref 0.1–0.6)
MONOCYTES NFR BLD AUTO: 12.4 % — HIGH (ref 1.7–9.3)
NEUTROPHILS # BLD AUTO: 2.33 K/UL — SIGNIFICANT CHANGE UP (ref 1.4–6.5)
NEUTROPHILS NFR BLD AUTO: 63 % — SIGNIFICANT CHANGE UP (ref 42.2–75.2)
NRBC # BLD: 0 /100 WBCS — SIGNIFICANT CHANGE UP (ref 0–0)
PHOSPHATE SERPL-MCNC: 3.7 MG/DL — SIGNIFICANT CHANGE UP (ref 2.1–4.9)
PLATELET # BLD AUTO: 230 K/UL — SIGNIFICANT CHANGE UP (ref 130–400)
PMV BLD: 10.5 FL — HIGH (ref 7.4–10.4)
POTASSIUM SERPL-MCNC: 4.6 MMOL/L — SIGNIFICANT CHANGE UP (ref 3.5–5)
POTASSIUM SERPL-SCNC: 4.6 MMOL/L — SIGNIFICANT CHANGE UP (ref 3.5–5)
PROT SERPL-MCNC: 5.3 G/DL — LOW (ref 6–8)
RBC # BLD: 3.16 M/UL — LOW (ref 4.2–5.4)
RBC # FLD: 17.1 % — HIGH (ref 11.5–14.5)
SODIUM SERPL-SCNC: 132 MMOL/L — LOW (ref 135–146)
VIT B12 SERPL-MCNC: 327 PG/ML — SIGNIFICANT CHANGE UP (ref 232–1245)
WBC # BLD: 3.7 K/UL — LOW (ref 4.8–10.8)
WBC # FLD AUTO: 3.7 K/UL — LOW (ref 4.8–10.8)

## 2024-09-08 RX ADMIN — Medication 900 UNIT(S)/HR: at 19:44

## 2024-09-08 RX ADMIN — POLYETHYLENE GLYCOL 3350 17 GRAM(S): 17 POWDER, FOR SOLUTION ORAL at 17:39

## 2024-09-08 RX ADMIN — Medication 1100 UNIT(S)/HR: at 04:21

## 2024-09-08 RX ADMIN — Medication 900 UNIT(S)/HR: at 20:06

## 2024-09-08 RX ADMIN — ACETAMINOPHEN 650 MILLIGRAM(S): 325 TABLET ORAL at 06:55

## 2024-09-08 RX ADMIN — ACETAMINOPHEN 650 MILLIGRAM(S): 325 TABLET ORAL at 13:16

## 2024-09-08 RX ADMIN — METOPROLOL TARTRATE 50 MILLIGRAM(S): 100 TABLET ORAL at 06:21

## 2024-09-08 RX ADMIN — ACETAMINOPHEN 650 MILLIGRAM(S): 325 TABLET ORAL at 06:21

## 2024-09-08 RX ADMIN — ACETAMINOPHEN 650 MILLIGRAM(S): 325 TABLET ORAL at 22:18

## 2024-09-08 RX ADMIN — Medication 900 UNIT(S)/HR: at 06:39

## 2024-09-08 RX ADMIN — CHLORHEXIDINE GLUCONATE 1 APPLICATION(S): 40 SOLUTION TOPICAL at 12:26

## 2024-09-08 RX ADMIN — POLYETHYLENE GLYCOL 3350 17 GRAM(S): 17 POWDER, FOR SOLUTION ORAL at 06:28

## 2024-09-08 RX ADMIN — Medication 0 UNIT(S)/HR: at 04:51

## 2024-09-08 RX ADMIN — Medication 900 UNIT(S)/HR: at 13:17

## 2024-09-08 RX ADMIN — Medication 20 MILLIGRAM(S): at 21:04

## 2024-09-08 RX ADMIN — Medication 900 UNIT(S)/HR: at 07:43

## 2024-09-08 RX ADMIN — ACETAMINOPHEN 650 MILLIGRAM(S): 325 TABLET ORAL at 21:04

## 2024-09-08 NOTE — PROGRESS NOTE ADULT - ATTENDING COMMENTS
clinically stable  awaiting input by GYN, Heme Onc, GI.  In my opinion, pt needs a colonoscopy to r/o transverse colon mass, GYN biopsy and wait for tissue biopsy and then staging. Attending Attestation (For Attendings USE Only)...

## 2024-09-08 NOTE — PROGRESS NOTE ADULT - SUBJECTIVE AND OBJECTIVE BOX
Name: SKYLAR ORTIZ  Age: 79y  Gender: Female    Pt was seen and examined.   c/o:   doing about same, stilll with right groin pain     Allergies:  No Known Allergies      PHYSICAL EXAM:    Vitals:  T(C): 36.7 (09-08-24 @ 19:40), Max: 36.8 (09-08-24 @ 11:31)  HR: 62 (09-08-24 @ 19:40) (58 - 64)  BP: 168/81 (09-08-24 @ 19:40) (166/83 - 169/72)  RR: 18 (09-08-24 @ 19:40) (18 - 18)  SpO2: --  Wt(kg): --Vital Signs Last 24 Hrs  T(C): 36.7 (08 Sep 2024 19:40), Max: 36.8 (08 Sep 2024 11:31)  T(F): 98.1 (08 Sep 2024 19:40), Max: 98.2 (08 Sep 2024 11:31)  HR: 62 (08 Sep 2024 19:40) (58 - 64)  BP: 168/81 (08 Sep 2024 19:40) (166/83 - 169/72)  BP(mean): --  RR: 18 (08 Sep 2024 19:40) (18 - 18)  SpO2: --          NECK: Supple, No JVD  CHEST/LUNG: CTA, B/L, No rales, rhonchi, wheezing, or rubs  HEART: S1,S2, N1 Regular rate and rhythm; No murmurs, rubs, or gallops  ABDOMEN: Soft, Nontender, Nondistended; Bowel sounds present  EXTREMITIES:  2+ Peripheral Pulses, No clubbing, cyanosis, or edema      LABS:                        9.0    3.70  )-----------( 230      ( 08 Sep 2024 05:33 )             27.1     09-08    132<L>  |  98  |  13  ----------------------------<  86  4.6   |  24  |  1.1    Ca    8.2<L>      08 Sep 2024 05:33  Phos  3.7     09-08  Mg     2.0     09-08    TPro  5.3<L>  /  Alb  3.1<L>  /  TBili  0.3  /  DBili  x   /  AST  12  /  ALT  6   /  AlkPhos  60  09-08    LIVER FUNCTIONS - ( 08 Sep 2024 05:33 )  Alb: 3.1 g/dL / Pro: 5.3 g/dL / ALK PHOS: 60 U/L / ALT: 6 U/L / AST: 12 U/L / GGT: x                 MEDICATIONS  (STANDING):  acetaminophen     Tablet .. 650 milliGRAM(s) Oral every 8 hours  atorvastatin 20 milliGRAM(s) Oral at bedtime  chlorhexidine 2% Cloths 1 Application(s) Topical daily  dextrose 5%. 1000 milliLiter(s) (50 mL/Hr) IV Continuous <Continuous>  dextrose 5%. 1000 milliLiter(s) (100 mL/Hr) IV Continuous <Continuous>  dextrose 50% Injectable 12.5 Gram(s) IV Push once  dextrose 50% Injectable 25 Gram(s) IV Push once  dextrose 50% Injectable 25 Gram(s) IV Push once  glucagon  Injectable 1 milliGRAM(s) IntraMuscular once  heparin  Infusion.  Unit(s)/Hr (11 mL/Hr) IV Continuous <Continuous>  insulin lispro (ADMELOG) corrective regimen sliding scale   SubCutaneous three times a day before meals  metoprolol succinate ER 50 milliGRAM(s) Oral daily  polyethylene glycol 3350 17 Gram(s) Oral two times a day  senna 2 Tablet(s) Oral at bedtime        RADIOLOGY & ADDITIONAL TESTS:    Imaging Personally Reviewed:  [ ] YES  [ ] NO    A/P:  79-year-old female with past medical history of prediabetes presents for right groin pain with radiation down anterior leg and constipation x 2 days.  Patient has arrived  from [facility] 1 week ago and has been staying with family who have noticed decreased appetite and complaints of right groin pain worsening when walking and decreased bowel movements despite utilizing prune juice and increasing fluids. Tylenol occasionally improves pain. Patient's daughter Queta notes patient underwent a recent surgery to her right colon in [facility], unsure what was done or what was removed but is concerned that her constipation and new pain could be related to the surgery. CTAP showed large multiloculated cystic mass extending from the pelvis into the abdomen as well as multiple fluid-filled cystic lesions within the abdomen concerning for malignancy with metastasis. Patient admitted for further work up as well as treatment for bilateral LE DVT.    #bilateral DVTs  VA Duplex LE bilateral: Right lower extremity DVT in peroneal vein and gastrocnemius veins. Left lower extremity DVT in gastrocnemius, posterior tibial, and peroneal veins.  Patient is high risk given likely malignancy.  - c/w heparin full AC  - daily cbc  - transfuse for Hgb < 7, keep active T&S    #METASTATIC CANCER :   #Large multiloculated cystic mass with areas of soft tissue:  #Constipation   #Decreased Appetite  - ED vitals notable for  ,afebrile, on RA; ED labs notable for neutrophil 90.3%, potassium 5.2 hemolyzed   CTAP : Large multiloculated cystic mass with areas of soft tissue, measuring 24.7 x 12.5 x 22.3 cm, extending from the pelvis into the upper abdomen. Questionable filling defects in both the right and left common femoral vein and deep venous thrombosis is not excluded. Indeterminate multiple fluid-filled/cystic lesions within the abdomen measuring up to 6.5 cm, felt to likely be related to cystic metastatic disease. Soft tissue density within the transverse colon adjacent to the splenic flexure on image 52 series 2. Findings may be related to focal stool. Subcutaneous nodule measuring 1.8 cm and subcentimeter sclerotic focus in T12, metastatic disease is not excluded  - Dr Jeffersno, reviewed records from [facility], pt was found to have metastatic cancer, possibly colon as primary. Was informed needed to start chemo, however there was no chemo available there at the time.  -pain control  -EKG for QTc, f/u   -Zofran PRN for vomiting   - Calorie count   - Bowel regimen   - monitor bowel movement   - IR following  - GI following  - f/u recs from Gyn Onc  -f/u PT eval     #Hyponatremia  - most likely due to decreased PO intake   - Na 132, improving  - daily CMP  - calorie count, encourage po intake    #Mild Hydronephrosis  - seen on CTA/P    - creat 1.0, unknown baseline   -bladder scan q6 for rentention   -can consider urology c/s if condition worsen     #Small Pleural effusion - resolved  - seen on CT A/P  - on RA   - CXR this AM no cardiopulmonary disease  - monitor fluid balance and pulse ox    #AORTIC ANEURYSM  4.5 cm   - systolic blood pressure was 170 in ED  - monitor Blood pressure, has been wnl   - unable to confirm med rec  - will start with toprol xl 50mg daily  - atorvastatin 20mg daily    #Prediabetes  - FS  - ISS    DVT ppx: heparin drip  Diet: DASH   Activity: ATT

## 2024-09-09 LAB
ALBUMIN SERPL ELPH-MCNC: 2.8 G/DL — LOW (ref 3.5–5.2)
ALP SERPL-CCNC: 66 U/L — SIGNIFICANT CHANGE UP (ref 30–115)
ALT FLD-CCNC: 8 U/L — SIGNIFICANT CHANGE UP (ref 0–41)
ANION GAP SERPL CALC-SCNC: 8 MMOL/L — SIGNIFICANT CHANGE UP (ref 7–14)
ANION GAP SERPL CALC-SCNC: 9 MMOL/L — SIGNIFICANT CHANGE UP (ref 7–14)
APTT BLD: 67.7 SEC — HIGH (ref 27–39.2)
AST SERPL-CCNC: 15 U/L — SIGNIFICANT CHANGE UP (ref 0–41)
BASOPHILS # BLD AUTO: 0 K/UL — SIGNIFICANT CHANGE UP (ref 0–0.2)
BASOPHILS NFR BLD AUTO: 0 % — SIGNIFICANT CHANGE UP (ref 0–1)
BILIRUB SERPL-MCNC: 0.3 MG/DL — SIGNIFICANT CHANGE UP (ref 0.2–1.2)
BUN SERPL-MCNC: 15 MG/DL — SIGNIFICANT CHANGE UP (ref 10–20)
BUN SERPL-MCNC: 16 MG/DL — SIGNIFICANT CHANGE UP (ref 10–20)
CALCIUM SERPL-MCNC: 7.9 MG/DL — LOW (ref 8.4–10.5)
CALCIUM SERPL-MCNC: 8.1 MG/DL — LOW (ref 8.4–10.4)
CHLORIDE SERPL-SCNC: 96 MMOL/L — LOW (ref 98–110)
CHLORIDE SERPL-SCNC: 99 MMOL/L — SIGNIFICANT CHANGE UP (ref 98–110)
CO2 SERPL-SCNC: 23 MMOL/L — SIGNIFICANT CHANGE UP (ref 17–32)
CO2 SERPL-SCNC: 23 MMOL/L — SIGNIFICANT CHANGE UP (ref 17–32)
CREAT SERPL-MCNC: 1.1 MG/DL — SIGNIFICANT CHANGE UP (ref 0.7–1.5)
CREAT SERPL-MCNC: 1.2 MG/DL — SIGNIFICANT CHANGE UP (ref 0.7–1.5)
EGFR: 47 ML/MIN/1.73M2 — LOW
EGFR: 52 ML/MIN/1.73M2 — LOW
EOSINOPHIL # BLD AUTO: 0 K/UL — SIGNIFICANT CHANGE UP (ref 0–0.7)
EOSINOPHIL NFR BLD AUTO: 0 % — SIGNIFICANT CHANGE UP (ref 0–8)
GLUCOSE BLDC GLUCOMTR-MCNC: 122 MG/DL — HIGH (ref 70–99)
GLUCOSE BLDC GLUCOMTR-MCNC: 128 MG/DL — HIGH (ref 70–99)
GLUCOSE BLDC GLUCOMTR-MCNC: 129 MG/DL — HIGH (ref 70–99)
GLUCOSE BLDC GLUCOMTR-MCNC: 95 MG/DL — SIGNIFICANT CHANGE UP (ref 70–99)
GLUCOSE SERPL-MCNC: 117 MG/DL — HIGH (ref 70–99)
GLUCOSE SERPL-MCNC: 87 MG/DL — SIGNIFICANT CHANGE UP (ref 70–99)
HCT VFR BLD CALC: 25.6 % — LOW (ref 37–47)
HCT VFR BLD CALC: 26.5 % — LOW (ref 37–47)
HGB BLD-MCNC: 8.4 G/DL — LOW (ref 12–16)
HGB BLD-MCNC: 8.5 G/DL — LOW (ref 12–16)
IMM GRANULOCYTES NFR BLD AUTO: 0.6 % — HIGH (ref 0.1–0.3)
INHIBIN B SERUM: 19.4 PG/ML — HIGH (ref 0–16.9)
LYMPHOCYTES # BLD AUTO: 0.74 K/UL — LOW (ref 1.2–3.4)
LYMPHOCYTES # BLD AUTO: 20.7 % — SIGNIFICANT CHANGE UP (ref 20.5–51.1)
MAGNESIUM SERPL-MCNC: 2 MG/DL — SIGNIFICANT CHANGE UP (ref 1.8–2.4)
MCHC RBC-ENTMCNC: 27.3 PG — SIGNIFICANT CHANGE UP (ref 27–31)
MCHC RBC-ENTMCNC: 27.7 PG — SIGNIFICANT CHANGE UP (ref 27–31)
MCHC RBC-ENTMCNC: 32.1 G/DL — SIGNIFICANT CHANGE UP (ref 32–37)
MCHC RBC-ENTMCNC: 32.8 G/DL — SIGNIFICANT CHANGE UP (ref 32–37)
MCV RBC AUTO: 84.5 FL — SIGNIFICANT CHANGE UP (ref 81–99)
MCV RBC AUTO: 85.2 FL — SIGNIFICANT CHANGE UP (ref 81–99)
MONOCYTES # BLD AUTO: 0.43 K/UL — SIGNIFICANT CHANGE UP (ref 0.1–0.6)
MONOCYTES NFR BLD AUTO: 12 % — HIGH (ref 1.7–9.3)
NEUTROPHILS # BLD AUTO: 2.39 K/UL — SIGNIFICANT CHANGE UP (ref 1.4–6.5)
NEUTROPHILS NFR BLD AUTO: 66.7 % — SIGNIFICANT CHANGE UP (ref 42.2–75.2)
NRBC # BLD: 0 /100 WBCS — SIGNIFICANT CHANGE UP (ref 0–0)
NRBC # BLD: 0 /100 WBCS — SIGNIFICANT CHANGE UP (ref 0–0)
PHOSPHATE SERPL-MCNC: 4.2 MG/DL — SIGNIFICANT CHANGE UP (ref 2.1–4.9)
PLATELET # BLD AUTO: 240 K/UL — SIGNIFICANT CHANGE UP (ref 130–400)
PLATELET # BLD AUTO: 240 K/UL — SIGNIFICANT CHANGE UP (ref 130–400)
PMV BLD: 10.3 FL — SIGNIFICANT CHANGE UP (ref 7.4–10.4)
PMV BLD: 10.4 FL — SIGNIFICANT CHANGE UP (ref 7.4–10.4)
POTASSIUM SERPL-MCNC: 4.5 MMOL/L — SIGNIFICANT CHANGE UP (ref 3.5–5)
POTASSIUM SERPL-MCNC: 4.5 MMOL/L — SIGNIFICANT CHANGE UP (ref 3.5–5)
POTASSIUM SERPL-SCNC: 4.5 MMOL/L — SIGNIFICANT CHANGE UP (ref 3.5–5)
POTASSIUM SERPL-SCNC: 4.5 MMOL/L — SIGNIFICANT CHANGE UP (ref 3.5–5)
PROT SERPL-MCNC: 5.2 G/DL — LOW (ref 6–8)
RBC # BLD: 3.03 M/UL — LOW (ref 4.2–5.4)
RBC # BLD: 3.11 M/UL — LOW (ref 4.2–5.4)
RBC # FLD: 16.7 % — HIGH (ref 11.5–14.5)
RBC # FLD: 16.9 % — HIGH (ref 11.5–14.5)
SODIUM SERPL-SCNC: 128 MMOL/L — LOW (ref 135–146)
SODIUM SERPL-SCNC: 130 MMOL/L — LOW (ref 135–146)
WBC # BLD: 3.58 K/UL — LOW (ref 4.8–10.8)
WBC # BLD: 3.77 K/UL — LOW (ref 4.8–10.8)
WBC # FLD AUTO: 3.58 K/UL — LOW (ref 4.8–10.8)
WBC # FLD AUTO: 3.77 K/UL — LOW (ref 4.8–10.8)

## 2024-09-09 PROCEDURE — 99253 IP/OBS CNSLTJ NEW/EST LOW 45: CPT

## 2024-09-09 RX ORDER — SODIUM CHLORIDE 9 MG/ML
1000 INJECTION INTRAMUSCULAR; INTRAVENOUS; SUBCUTANEOUS
Refills: 0 | Status: DISCONTINUED | OUTPATIENT
Start: 2024-09-09 | End: 2024-09-11

## 2024-09-09 RX ORDER — HYDRALAZINE HCL 50 MG
50 TABLET ORAL EVERY 12 HOURS
Refills: 0 | Status: DISCONTINUED | OUTPATIENT
Start: 2024-09-09 | End: 2024-09-12

## 2024-09-09 RX ADMIN — METOPROLOL TARTRATE 50 MILLIGRAM(S): 100 TABLET ORAL at 05:49

## 2024-09-09 RX ADMIN — SODIUM CHLORIDE 75 MILLILITER(S): 9 INJECTION INTRAMUSCULAR; INTRAVENOUS; SUBCUTANEOUS at 12:50

## 2024-09-09 RX ADMIN — Medication 20 MILLIGRAM(S): at 21:55

## 2024-09-09 RX ADMIN — ACETAMINOPHEN 650 MILLIGRAM(S): 325 TABLET ORAL at 21:56

## 2024-09-09 RX ADMIN — POLYETHYLENE GLYCOL 3350 17 GRAM(S): 17 POWDER, FOR SOLUTION ORAL at 05:49

## 2024-09-09 RX ADMIN — Medication 2 TABLET(S): at 21:56

## 2024-09-09 RX ADMIN — ACETAMINOPHEN 650 MILLIGRAM(S): 325 TABLET ORAL at 21:55

## 2024-09-09 RX ADMIN — Medication 900 UNIT(S)/HR: at 04:55

## 2024-09-09 RX ADMIN — CHLORHEXIDINE GLUCONATE 1 APPLICATION(S): 40 SOLUTION TOPICAL at 12:50

## 2024-09-09 RX ADMIN — ACETAMINOPHEN 650 MILLIGRAM(S): 325 TABLET ORAL at 05:49

## 2024-09-09 NOTE — PROGRESS NOTE ADULT - ATTENDING COMMENTS
pt is clinically stable at this time   HTN is uncontrolled at this time so will initiate hydralazine 50mg po q12 and this should control it  will cont all other medications as are   pt is on Heparin drip for  b/l  DVT so will need an IVC filter prior to surgery  IR consult appreciated   from medical standpoint pt is medically optimized once blood pressure controlled which will likely be in am.  Therefore, pt has no contraindicartions to proceed with surgery as GYN sees fit.

## 2024-09-09 NOTE — CHART NOTE - NSCHARTNOTEFT_GEN_A_CORE
CALORIE COUNT 9/5 - 9/7    Diet, DASH/TLC:   Sodium & Cholesterol Restricted (09-04-24 @ 21:41) [Active]    DAY 1: 9/5  Breakfast: Not documented  Lunch: 75% juice, vegetables, soup, dessert  Dinner: 75% of juice, meat, vegetables, soup, and dessert  Total: ~896 kcal, 30.5 gm protein    DAY 2: 9/6  Not documented      DAY 3: 9/7  Breakfast: 25% of milk, juice, coffee, cereal, eggs, bread, pancake  Lunch: 50% of milk, juice, meat, soup, dessert  Dinner: 25% juice, coffee, starch (rice/potatoes), vegetables, dessert  Total: ~681 kcal, 20 gm protein       Unable to assess 3 day average intake as day 2 not documented on CC.     Limited hx obtained from family at bedside, reports good PO and intake PTA - states pt has smaller meals at baseline. Denies decrease in PO before admission. Denies any weight loss recently. Pt tolerating foods well. Family reports pt is eating well on admission at her baseline. Discussed oral nutrition supplements with the family, agreeable to Magic Cup 2x/day (290 kcal, 9 gm protein/serving) to optimize PO intake.     RD: Ebony Kendall x [phone] or via TEAMS CALORIE COUNT 9/5 - 9/7    Diet, DASH/TLC:   Sodium & Cholesterol Restricted (09-04-24 @ 21:41) [Active]    DAY 1: 9/5  Breakfast: Not documented  Lunch: 75% juice, vegetables, soup, dessert  Dinner: 75% of juice, meat, vegetables, soup, and dessert  Total: ~896 kcal, 30.5 gm protein    DAY 2: 9/6  Not documented      DAY 3: 9/7  Breakfast: 25% of milk, juice, coffee, cereal, eggs, bread, pancake  Lunch: 50% of milk, juice, meat, soup, dessert  Dinner: 25% juice, coffee, starch (rice/potatoes), vegetables, dessert  Total: ~681 kcal, 20 gm protein       Unable to assess 3 day average intake as day 2 not documented on CC.     Limited hx obtained from family at bedside, reports good PO and intake PTA - states pt has smaller meals at baseline. Denies decrease in PO before admission. Denies any weight loss recently. Pt tolerating foods well. Family reports pt is eating well on admission at her baseline. Pt avoids Pork in her diet. Discussed oral nutrition supplements with the family, agreeable to Magic Cup 2x/day (290 kcal, 9 gm protein/serving) to optimize PO intake.     RD: Ebony Kendall x [phone] or via TEAMS

## 2024-09-09 NOTE — CONSULT NOTE ADULT - SUBJECTIVE AND OBJECTIVE BOX
HPI: "79y year old s/p abdominal surgery in [facility], likely due to metastatic cancer, possibly primarily from colon. Patient came back from [facility] last week and presented with constipation, groin pain radiating towards the leg, found to have a large multiloculated cystic measuring 24.7 x 12.5 x 22.3 cm, extending from the pelvis into the upper abdomen. Patient unable to verbalize when menopause was but denies current vaginal bleeding. Per chart review, patient has denies diarrhea, constipation, N/V as well. "  - Patient seen this morning at bedside with first cousin, who was used as . She believes that about 2-3 months ago, patient had a surgery in [facility] where they opened her abdomen and noted the large mass and then closed it back up without any intervention. Reports decreased appetite and abdominal swelling for an unknown amount of time, does not believe it is recently worsening though. She does not have any further information than that. Patient denies any vaginal bleeding. Reports lower extremity swelling that started after her flight from [facility] (which was 2 weeks ago), found to have bilateral LE DVTs here, currently on heparin drip.     : 34. CA 19-9: 30. CEA: 4.8.     Ob/Gyn History: Limited, patient poor historian.     Denies the following: constitutional symptoms, visual symptoms, cardiovascular symptoms, respiratory symptoms, GI symptoms, musculoskeletal symptoms, skin symptoms, neurologic symptoms, hematologic symptoms, allergic symptoms, psychiatric symptoms  Except any pertinent positives listed.     Home Medications:  Unknown    Allergies:  No Known Allergies    PAST MEDICAL & SURGICAL HISTORY:  Ex-lap     FAMILY HISTORY: Unknown    SOCIAL HISTORY: Unknown    Vital Signs Last 24 Hrs  T(F): 97.6 (09 Sep 2024 06:17), Max: 98.2 (08 Sep 2024 11:31)  HR: 59 (09 Sep 2024 06:17) (58 - 62)  BP: 117/67 (09 Sep 2024 06:17) (117/67 - 169/72)  RR: 18 (09 Sep 2024 06:17) (18 - 18)    General Appearance - Thin, NAD  Abdomen - Distended, hard, vertical incision to umbilicus.    GYN/Pelvis - Deferred at this time. Please see GYN consult note.     Meds:   (ADM OVERRIDE) 1 each &lt;see task&gt; GiveOnce  amLODIPine   Tablet 5 milliGRAM(s) Oral once  heparin   Injectable 5000 Unit(s) IV Push once  iohexol 300 mG (iodine)/mL Oral Solution 30 milliLiter(s) Oral once    LABS:                        8.5    3.58  )-----------( 240      ( 09 Sep 2024 07:24 )             26.5         09-09    128<L>  |  96<L>  |  16  ----------------------------<  87  4.5   |  23  |  1.2    Ca    8.1<L>      09 Sep 2024 07:24  Phos  4.2     09-09  Mg     2.0     09-09    TPro  5.2<L>  /  Alb  2.8<L>  /  TBili  0.3  /  DBili  x   /  AST  15  /  ALT  8   /  AlkPhos  66  09-09    PTT - ( 09 Sep 2024 04:10 )  PTT:67.7 sec  Urinalysis Basic - ( 09 Sep 2024 07:24 )    Color: x / Appearance: x / SG: x / pH: x  Gluc: 87 mg/dL / Ketone: x  / Bili: x / Urobili: x   Blood: x / Protein: x / Nitrite: x   Leuk Esterase: x / RBC: x / WBC x   Sq Epi: x / Non Sq Epi: x / Bacteria: x    RADIOLOGY & ADDITIONAL STUDIES:  < from: CT Chest w/ IV Cont (09.04.24 @ 15:30) >    ACC: [id number] EXAM:  CT CHEST IC   ORDERED BY: ANTON NUNES     ACC: [id number] EXAM:  CT ABDOMEN AND PELVIS OC IC   ORDERED BY: ANTON NUNES     PROCEDURE DATE:  09/04/2024      INTERPRETATION:  CLINICAL STATEMENT: History of metastatic cancer, rule   out small bowel obstruction    TECHNIQUE: Contiguous axial CT images were obtained from the thoracic   inlet to the pubic symphysis .  100 cc administered of IV contrast   documented in unlinked concurrent exam (accession [id number]), Omnipaque   350 (accession [id number]) (0 cc discarded).  Oral contrast was given.    Reformatted images in the coronal and sagittal planes, as well as MIP   reconstructed images, were acquired.    COMPARISON CT: None.    FINDINGS:    CHEST:    LUNGS/PLEURA/AIRWAYS: The central tracheobronchial tree is patent. There   is a small right pleural effusion with atelectasis..    MEDIASTINUM/THORACIC NODES: The thyroid gland is present. There is no   evidence of axillary, mediastinal or hilar lymphadenopathy..    HEART/GREAT VESSELS: There are vascular calcifications. The ascending   aorta is aneurysmal measuring 4.5 cm. There is no pericardial effusion..      ABDOMEN/PELVIS:    HEPATOBILIARY: Unremarkable...    SPLEEN: There is a calcified splenic artery aneurysm adjacent to the left   adrenal gland measuring 1.6 cm.    PANCREAS: Atrophy.    ADRENAL GLANDS: Unremarkable..    KIDNEYS: There is mild bilateral hydroureteronephrosis..    ABDOMINOPELVIC NODES: Unremarkable...    PELVIC ORGANS: There is a large multiloculated cystic mass with areas of   soft tissue, measuring 24.7 x 12.5 x 22.3 cm, extending into the upper   abdomen. Findings are concerning for malignancy; felt to likely be   gynecologic in etiology, but this mass remains indeterminate. Within the   upper abdomen and left upper quadrant, there are multiple   fluid-filled/cystic lesions measuring up to 6.5 cm, felt to likely be   related to cystic metastatic disease    PERITONEUM/MESENTERY/BOWEL: There is no free air or obstruction. There is   an area of soft tissue density within the transverse colon adjacent to   the splenic flexure on image 52 series 2. Findings may be related to   focal stool. However, underlying neoplasm cannot be excluded..    BONES/SOFT TISSUES: Within the subcutaneous tissues of the anterior left   pelvic wall, there is a 1.8 x 1.0 cm nodule in image 109 of series 2 and   a metastatic focus is not excluded. Degenerative change. There is a T12   sclerotic focus measuring 0.8 cm, possibly representing a bone island. A   blastic metastasis is not excluded...    OTHER: Vascular calcifications. There are questionable filling defects in   both the right and left common femoral vein and deep venous thrombosis is   not excluded. Further evaluation with ultrasound is recommended...    IMPRESSION:    Large multiloculated cystic mass with areas of soft tissue, measuring   24.7 x 12.5 x 22.3 cm, extending from the pelvis into the upper abdomen.    Findings are concerning for malignancy; felt to likely be gynecologic in   etiology, but this mass remains indeterminate.    Questionable filling defects in both the right and left common femoral   vein and deep venous thrombosis is not excluded.    Further evaluation with ultrasound is recommended.    Mild bilateral hydronephrosis    Indeterminate multiple fluid-filled/cystic lesions within the abdomen   measuring up to 6.5 cm, felt to likely be related to cystic metastatic   disease    Soft tissue density within the transverse colon adjacent to the splenic   flexure on image 52 series 2. Findings may be related to focal stool.   Underlying neoplasm cannot be excluded.    Subcutaneous nodule measuring 1.8 cm and subcentimeter sclerotic focus in   T12, metastatic disease is not excluded    Small rightpleural effusion.    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    Findings discussed with PRAMOD Nunes on 9/4/2024 at 5:59 PM with read back    --- End of Report ---      DOUGLAS KIM MD; Attending Radiologist  This document has been electronically signed. Sep  4 2024  6:05PM    < end of copied text >

## 2024-09-09 NOTE — PROGRESS NOTE ADULT - SUBJECTIVE AND OBJECTIVE BOX
SKYLAR ORTIZ 79y Female  MRN#: [id number]     Hospital Day: 5d    Pt is currently admitted with the primary diagnosis of  Intra-abdominal and pelvic swelling of mass or lump        SUBJECTIVE     Overnight events  None    Subjective complaints  Pt was evaluated this am. Patient denied any active complaints and per patient his symptoms are improving                                            ----------------------------------------------------------  OBJECTIVE  PAST MEDICAL & SURGICAL HISTORY                                            -----------------------------------------------------------  ALLERGIES:  No Known Allergies                                            ------------------------------------------------------------    HOME MEDICATIONS  Home Medications:                           MEDICATIONS:  STANDING MEDICATIONS  acetaminophen     Tablet .. 650 milliGRAM(s) Oral every 8 hours  atorvastatin 20 milliGRAM(s) Oral at bedtime  chlorhexidine 2% Cloths 1 Application(s) Topical daily  dextrose 5%. 1000 milliLiter(s) IV Continuous <Continuous>  dextrose 5%. 1000 milliLiter(s) IV Continuous <Continuous>  dextrose 50% Injectable 12.5 Gram(s) IV Push once  dextrose 50% Injectable 25 Gram(s) IV Push once  dextrose 50% Injectable 25 Gram(s) IV Push once  glucagon  Injectable 1 milliGRAM(s) IntraMuscular once  heparin  Infusion.  Unit(s)/Hr IV Continuous <Continuous>  insulin lispro (ADMELOG) corrective regimen sliding scale   SubCutaneous three times a day before meals  metoprolol succinate ER 50 milliGRAM(s) Oral daily  polyethylene glycol 3350 17 Gram(s) Oral two times a day  senna 2 Tablet(s) Oral at bedtime  sodium chloride 0.9%. 1000 milliLiter(s) IV Continuous <Continuous>    PRN MEDICATIONS  dextrose Oral Gel 15 Gram(s) Oral once PRN  heparin   Injectable 5000 Unit(s) IV Push every 6 hours PRN  heparin   Injectable 2500 Unit(s) IV Push every 6 hours PRN  oxycodone    5 mG/acetaminophen 325 mG 1 Tablet(s) Oral every 6 hours PRN                                            ------------------------------------------------------------  VITAL SIGNS: Last 24 Hours  T(C): 36.9 (09 Sep 2024 12:56), Max: 36.9 (09 Sep 2024 12:56)  T(F): 98.5 (09 Sep 2024 12:56), Max: 98.5 (09 Sep 2024 12:56)  HR: 60 (09 Sep 2024 12:56) (59 - 62)  BP: 151/72 (09 Sep 2024 12:56) (117/67 - 168/81)  BP(mean): --  RR: 18 (09 Sep 2024 12:56) (18 - 18)  SpO2: 98% (09 Sep 2024 12:56) (98% - 98%)                                             --------------------------------------------------------------  LABS:                        8.5    3.58  )-----------( 240      ( 09 Sep 2024 07:24 )             26.5     09-09    128<L>  |  96<L>  |  16  ----------------------------<  87  4.5   |  23  |  1.2    Ca    8.1<L>      09 Sep 2024 07:24  Phos  4.2     09-09  Mg     2.0     09-09    TPro  5.2<L>  /  Alb  2.8<L>  /  TBili  0.3  /  DBili  x   /  AST  15  /  ALT  8   /  AlkPhos  66  09-09    PTT - ( 09 Sep 2024 04:10 )  PTT:67.7 sec  Urinalysis Basic - ( 09 Sep 2024 07:24 )    Color: x / Appearance: x / SG: x / pH: x  Gluc: 87 mg/dL / Ketone: x  / Bili: x / Urobili: x   Blood: x / Protein: x / Nitrite: x   Leuk Esterase: x / RBC: x / WBC x   Sq Epi: x / Non Sq Epi: x / Bacteria: x        PHYSICAL EXAM:  GENERAL: NAD, lying in bed comfortably  HEAD:  Atraumatic, Normocephalic  EYES: EOMI, conjunctiva and sclera clear  ENT: Moist mucous membranes  NECK: Supple, No JVD  CHEST/LUNG: Clear to auscultation bilaterally; No rales, rhonchi, wheezing, or rubs. Unlabored respirations  HEART: regular rate and rhythm; No murmurs, rubs, or gallops  ABDOMEN: Bowel sounds present; Soft, Nontender, Nondistended.    EXTREMITIES: Warm. No clubbing, cyanosis, or edema  NERVOUS SYSTEM:  Alert & Oriented X3. No focal deficits   SKIN: No rashes or lesions                                           --------------------------------------------------------------

## 2024-09-09 NOTE — CONSULT NOTE ADULT - SUBJECTIVE AND OBJECTIVE BOX
INTERVENTIONAL RADIOLOGY CONSULT:     Procedure Requested:     HPI:   79-year-old female with past medical history of prediabetes presents for right groin pain with radiation down anterior leg and constipation x 2 days.  Patient has arrived  from [facility] 1 week ago and has been staying with family who have noticed decreased appetite and complaints of right groin pain worsening when walking and decreased bowel movements despite utilizing prune juice and increasing fluids. Tylenol occasionally improves pain.   Patient was seen by her primary Dr Jefferson  last night and directed to the emergency department.  Patient's daughter Queta notes patient underwent a recent surgery to her right colon and [facility], unsure what was done or what was removed but is concerned that her constipation and new pain could be related to the surgery   Denies any  fevers chills nausea vomiting diarrhea no numbness/weakness    off note:As per primary Dr Jefferson, reviewed records from [facility], pt was found to have metastatic cancer, possibly colon as primary. Was informed needed to start chemo, however there was no chemo available there at the time.      In Ed on vitals   · BP Systolic	 174 mm Hg  · BP Diastolic	83 mm Hg  · Heart Rate	68 /min  afebrile on RA     On labs Hb 10.2 , neutrophil % 90.3 , Na 130 , potassium 5.2 hemolysed   On imaging  On  CT A/P :   Large multiloculated cystic mass with areas of soft tissue, measuring   24.7 x 12.5 x 22.3 cm, extending from the pelvis into the upper abdomen.    Findings are concerning for malignancy; felt to likely be gynecologic in   etiology, but this mass remains indeterminate.    Questionable filling defects in both the right and left common femoral   vein and deep venous thrombosis is not excluded.      Mild bilateral hydronephrosis    Indeterminate multiple fluid-filled/cystic lesions within the abdomen   measuring up to 6.5 cm, felt to likely be related to cystic metastatic   disease    Soft tissue density within the transverse colon adjacent to the splenic   flexure on image 52 series 2. Findings may be related to focal stool.   Underlying neoplasm cannot be excluded.    Subcutaneous nodule measuring 1.8 cm and subcentimeter sclerotic focus in   T12, metastatic disease is not excluded    Small right pleural effusion.    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    As per Ed the prelim Duplex BLE report showed  DVTs from gastroc/popliteal/peroneal. Not including femoral        admitted for further workup (04 Sep 2024 20:27)      PAST MEDICAL & SURGICAL HISTORY:      MEDICATIONS  (STANDING):  acetaminophen     Tablet .. 650 milliGRAM(s) Oral every 8 hours  atorvastatin 20 milliGRAM(s) Oral at bedtime  chlorhexidine 2% Cloths 1 Application(s) Topical daily  dextrose 5%. 1000 milliLiter(s) (50 mL/Hr) IV Continuous <Continuous>  dextrose 5%. 1000 milliLiter(s) (100 mL/Hr) IV Continuous <Continuous>  dextrose 50% Injectable 12.5 Gram(s) IV Push once  dextrose 50% Injectable 25 Gram(s) IV Push once  dextrose 50% Injectable 25 Gram(s) IV Push once  glucagon  Injectable 1 milliGRAM(s) IntraMuscular once  heparin  Infusion.  Unit(s)/Hr (11 mL/Hr) IV Continuous <Continuous>  insulin lispro (ADMELOG) corrective regimen sliding scale   SubCutaneous three times a day before meals  metoprolol succinate ER 50 milliGRAM(s) Oral daily  polyethylene glycol 3350 17 Gram(s) Oral two times a day  senna 2 Tablet(s) Oral at bedtime  sodium chloride 0.9%. 1000 milliLiter(s) (75 mL/Hr) IV Continuous <Continuous>    MEDICATIONS  (PRN):  dextrose Oral Gel 15 Gram(s) Oral once PRN Blood Glucose LESS THAN 70 milliGRAM(s)/deciliter  heparin   Injectable 5000 Unit(s) IV Push every 6 hours PRN For aPTT less than 40  heparin   Injectable 2500 Unit(s) IV Push every 6 hours PRN For aPTT between 40 - 57  oxycodone    5 mG/acetaminophen 325 mG 1 Tablet(s) Oral every 6 hours PRN Severe Pain (7 - 10)      Allergies    No Known Allergies    Intolerances          FAMILY HISTORY:      Physical Exam:   Vital Signs Last 24 Hrs  T(C): 36.9 (09 Sep 2024 12:56), Max: 36.9 (09 Sep 2024 12:56)  T(F): 98.5 (09 Sep 2024 12:56), Max: 98.5 (09 Sep 2024 12:56)  HR: 60 (09 Sep 2024 12:56) (59 - 62)  BP: 151/72 (09 Sep 2024 12:56) (117/67 - 168/81)  BP(mean): --  RR: 18 (09 Sep 2024 12:56) (18 - 18)  SpO2: 98% (09 Sep 2024 12:56) (98% - 98%)          Labs:                         8.5    3.58  )-----------( 240      ( 09 Sep 2024 07:24 )             26.5     09-09    128<L>  |  96<L>  |  16  ----------------------------<  87  4.5   |  23  |  1.2    Ca    8.1<L>      09 Sep 2024 07:24  Phos  4.2     09-09  Mg     2.0     09-09    TPro  5.2<L>  /  Alb  2.8<L>  /  TBili  0.3  /  DBili  x   /  AST  15  /  ALT  8   /  AlkPhos  66  09-09    PTT - ( 09 Sep 2024 04:10 )  PTT:67.7 sec    Pertinent labs:                      8.5    3.58  )-----------( 240      ( 09 Sep 2024 07:24 )             26.5       09-09    128<L>  |  96<L>  |  16  ----------------------------<  87  4.5   |  23  |  1.2    Ca    8.1<L>      09 Sep 2024 07:24  Phos  4.2     09-09  Mg     2.0     09-09    TPro  5.2<L>  /  Alb  2.8<L>  /  TBili  0.3  /  DBili  x   /  AST  15  /  ALT  8   /  AlkPhos  66  09-09      PTT - ( 09 Sep 2024 04:10 )  PTT:67.7 sec    Radiology & Additional Studies:     Radiology imaging reviewed.       ASSESSMENT AND PLAN:  79F w/ large pelvis mass. Gyn/onc planning possible surgical excision. IR is consulted for preoperative IVC filter placement.    -IVC filter can be placed 24-48hrs prior surgery. Please reconsult once a surgery date is determined.    Please call Interventional Radiology with questions or concerns:   - M-F [id number]: [phone]   - All other hours: [phone]

## 2024-09-09 NOTE — CONSULT NOTE ADULT - ASSESSMENT
78yo with PMHx prediabetes, currently with BLE DVTs on Heparin drip, with abdominal/pelvic mass possibly GYN in origin  - Discussed with patient and her family at bedside that the mass appears resectable and surgery is recommended to make a diagnosis.   - Please obtain medical clearances. We will work on adding her on to the scheduled afterwards.   - Unable to obtain IR biopsy.   - Heme/onc following. GI following. Will also discuss with SurgOnc team.   - Rest of care per primary team.

## 2024-09-09 NOTE — PROGRESS NOTE ADULT - ASSESSMENT
79-year-old female with past medical history of prediabetes presents for right groin pain with radiation down anterior leg and constipation x 2 days.  Patient has arrived  from [facility] 1 week ago and has been staying with family who have noticed decreased appetite and complaints of right groin pain worsening when walking and decreased bowel movements despite utilizing prune juice and increasing fluids. Tylenol occasionally improves pain. Patient's daughter Queta notes patient underwent a recent surgery to her right colon in [facility], unsure what was done or what was removed but is concerned that her constipation and new pain could be related to the surgery. CTAP showed large multiloculated cystic mass extending from the pelvis into the abdomen as well as multiple fluid-filled cystic lesions within the abdomen concerning for malignancy with metastasis. Patient admitted for further work up as well as treatment for bilateral LE DVT.    # bilateral DVTs  - [facility] Duplex LE bilateral: Right lower extremity DVT in peroneal vein and gastrocnemius veins. Left lower extremity DVT in gastrocnemius, posterior tibial, and peroneal   veins.  - Patient is high risk given likely malignancy.  - c/w heparin full AC  - daily cbc  - transfuse for Hgb < 7, keep active T&S    #METASTATIC CANCER, possibly colon as primary   #Large multiloculated cystic mass with areas of soft tissue:  #Constipation   #Decreased Appetite  - ED vitals notable for  ,afebrile, on RA; ED labs notable for neutrophil 90.3%, potassium 5.2 hemolyzed   CTAP : Large multiloculated cystic mass with areas of soft tissue, measuring 24.7 x 12.5 x 22.3 cm, extending from the pelvis into the upper abdomen. Questionable filling defects in both the right and left common femoral vein and deep venous thrombosis is not excluded. Indeterminate multiple fluid-filled/cystic lesions within the abdomen measuring up to 6.5 cm, felt to likely be related to cystic metastatic disease. Soft tissue density within the transverse colon adjacent to the splenic flexure on image 52 series 2. Findings may be related to focal stool. Subcutaneous nodule measuring 1.8 cm and subcentimeter sclerotic focus in T12, metastatic disease is not excluded  - Dr Jefferson, reviewed records from [facility], pt was found to have metastatic cancer, possibly colon as primary. Was informed needed to start chemo, however there was no chemo available there at the time.  - pain control  - EKG for QTc, f/u   - Zofran PRN for vomiting   - Calorie count done- Magic Cup 2x/day (290 kcal, 9 gm protein/serving) to optimize PO intake. patient is at baseline PO intake as per family  - continue with Bowel regimen and monitor Bowel movement  - Ca 125 N, Ca 19-9 N, CEA elevated 4.8, Estradiol 15  - Iron 36, Transferrin 20, TIBC low 178, LDH normal, Haptoglobin 224, retics 1.6, RBC 3.28  - IR following, As per IR 24-48hrs prior IVC filter placement, awaiting date fixation for surgery by gynae  - Gyne following -  work on adding her on to the scheduled for possible explorative lap surgery, awaiting medical clearance and stratificatioh  - GI following- as per GI, outpatient colonoscopy  - bowel regimen  - serial abdominal physical exams   - avoid opiates  - monitor BMs  - encourage ambulation - PT following    # Hyponatremia  - most likely due to decreased PO intake   - Na 132 > 128 (9/9) on NS @ 75ml/hr  - calorie count, encourage po intake    #Mild Hydronephrosis  - seen on CTA/P    - creat 1.0, unknown baseline   - bladder scan q6 for rentention   - can consider urology c/s if condition worsen     #Small Pleural effusion - resolved  - seen on CT A/P  - on RA   - CXR this AM no cardiopulmonary disease  - monitor fluid balance and pulse ox    #AORTIC ANEURYSM  4.5 cm   - systolic blood pressure was 170 in ED  - monitor Blood pressure, has been wnl   - unable to confirm med rec  - will start with toprol xl 50mg daily  - atorvastatin 20mg daily    #Prediabetes  - FS  - ISS    DVT ppx: heparin drip  Diet: DASH   Activity: ATT     Progress note Handoff- plan for sugery by gyne, preop IVC filter, medical clearance and stratification.  F/u:  vitamin b12, folate, Retics, inhibin a/b, estradiol, TVUS

## 2024-09-10 ENCOUNTER — RESULT REVIEW (OUTPATIENT)
Age: 79
End: 2024-09-10

## 2024-09-10 LAB
ANION GAP SERPL CALC-SCNC: 10 MMOL/L — SIGNIFICANT CHANGE UP (ref 7–14)
APTT BLD: 86.1 SEC — CRITICAL HIGH (ref 27–39.2)
BUN SERPL-MCNC: 15 MG/DL — SIGNIFICANT CHANGE UP (ref 10–20)
CALCIUM SERPL-MCNC: 8.2 MG/DL — LOW (ref 8.4–10.5)
CHLORIDE SERPL-SCNC: 98 MMOL/L — SIGNIFICANT CHANGE UP (ref 98–110)
CO2 SERPL-SCNC: 22 MMOL/L — SIGNIFICANT CHANGE UP (ref 17–32)
CREAT SERPL-MCNC: 1 MG/DL — SIGNIFICANT CHANGE UP (ref 0.7–1.5)
EGFR: 58 ML/MIN/1.73M2 — LOW
GLUCOSE BLDC GLUCOMTR-MCNC: 112 MG/DL — HIGH (ref 70–99)
GLUCOSE BLDC GLUCOMTR-MCNC: 137 MG/DL — HIGH (ref 70–99)
GLUCOSE BLDC GLUCOMTR-MCNC: 154 MG/DL — HIGH (ref 70–99)
GLUCOSE BLDC GLUCOMTR-MCNC: 92 MG/DL — SIGNIFICANT CHANGE UP (ref 70–99)
GLUCOSE SERPL-MCNC: 95 MG/DL — SIGNIFICANT CHANGE UP (ref 70–99)
HCT VFR BLD CALC: 29.1 % — LOW (ref 37–47)
HGB BLD-MCNC: 9.3 G/DL — LOW (ref 12–16)
MAGNESIUM SERPL-MCNC: 1.9 MG/DL — SIGNIFICANT CHANGE UP (ref 1.8–2.4)
MCHC RBC-ENTMCNC: 27.8 PG — SIGNIFICANT CHANGE UP (ref 27–31)
MCHC RBC-ENTMCNC: 32 G/DL — SIGNIFICANT CHANGE UP (ref 32–37)
MCV RBC AUTO: 87.1 FL — SIGNIFICANT CHANGE UP (ref 81–99)
NRBC # BLD: 0 /100 WBCS — SIGNIFICANT CHANGE UP (ref 0–0)
PLATELET # BLD AUTO: 233 K/UL — SIGNIFICANT CHANGE UP (ref 130–400)
PMV BLD: 10.2 FL — SIGNIFICANT CHANGE UP (ref 7.4–10.4)
POTASSIUM SERPL-MCNC: 4.4 MMOL/L — SIGNIFICANT CHANGE UP (ref 3.5–5)
POTASSIUM SERPL-SCNC: 4.4 MMOL/L — SIGNIFICANT CHANGE UP (ref 3.5–5)
RBC # BLD: 3.34 M/UL — LOW (ref 4.2–5.4)
RBC # FLD: 16.7 % — HIGH (ref 11.5–14.5)
SODIUM SERPL-SCNC: 130 MMOL/L — LOW (ref 135–146)
WBC # BLD: 3.16 K/UL — LOW (ref 4.8–10.8)
WBC # FLD AUTO: 3.16 K/UL — LOW (ref 4.8–10.8)

## 2024-09-10 PROCEDURE — 93306 TTE W/DOPPLER COMPLETE: CPT | Mod: 26

## 2024-09-10 PROCEDURE — 99255 IP/OBS CONSLTJ NEW/EST HI 80: CPT

## 2024-09-10 RX ORDER — NEOMYCIN SULFATE
1000 POWDER (GRAM) MISCELLANEOUS
Refills: 0 | Status: COMPLETED | OUTPATIENT
Start: 2024-09-10 | End: 2024-09-11

## 2024-09-10 RX ORDER — POLYETHYLENE GLYCOL 3350, SODIUM SULFATE ANHYDROUS, SODIUM BICARBONATE, SODIUM CHLORIDE, POTASSIUM CHLORIDE 236; 22.74; 6.74; 5.86; 2.97 G/4L; G/4L; G/4L; G/4L; G/4L
1000 POWDER, FOR SOLUTION ORAL ONCE
Refills: 0 | Status: DISCONTINUED | OUTPATIENT
Start: 2024-09-11 | End: 2024-09-11

## 2024-09-10 RX ORDER — METRONIDAZOLE 250 MG
1000 TABLET ORAL
Refills: 0 | Status: COMPLETED | OUTPATIENT
Start: 2024-09-10 | End: 2024-09-11

## 2024-09-10 RX ORDER — HEPARIN SODIUM,BOVINE 1000/ML
900 VIAL (ML) INJECTION
Qty: 25000 | Refills: 0 | Status: DISCONTINUED | OUTPATIENT
Start: 2024-09-10 | End: 2024-09-11

## 2024-09-10 RX ADMIN — ACETAMINOPHEN 650 MILLIGRAM(S): 325 TABLET ORAL at 13:18

## 2024-09-10 RX ADMIN — CHLORHEXIDINE GLUCONATE 1 APPLICATION(S): 40 SOLUTION TOPICAL at 12:05

## 2024-09-10 RX ADMIN — ACETAMINOPHEN 650 MILLIGRAM(S): 325 TABLET ORAL at 06:42

## 2024-09-10 RX ADMIN — Medication 20 MILLIGRAM(S): at 22:00

## 2024-09-10 RX ADMIN — Medication 900 UNIT(S)/HR: at 10:29

## 2024-09-10 RX ADMIN — Medication 50 MILLIGRAM(S): at 17:31

## 2024-09-10 RX ADMIN — METOPROLOL TARTRATE 50 MILLIGRAM(S): 100 TABLET ORAL at 07:52

## 2024-09-10 RX ADMIN — ACETAMINOPHEN 650 MILLIGRAM(S): 325 TABLET ORAL at 22:02

## 2024-09-10 RX ADMIN — ACETAMINOPHEN 650 MILLIGRAM(S): 325 TABLET ORAL at 07:18

## 2024-09-10 RX ADMIN — ACETAMINOPHEN 650 MILLIGRAM(S): 325 TABLET ORAL at 22:00

## 2024-09-10 RX ADMIN — Medication 50 MILLIGRAM(S): at 07:52

## 2024-09-10 NOTE — PROGRESS NOTE ADULT - ATTENDING COMMENTS
Blood pressure much better today on hydralazine  pt does have occasional increased BP 2/2 anxiety  so will keep Hydralazine at current dose 50mg po q12  TTE noted - Normal EF and no significant wall motion abn  Labs reviewed and are acceptable for surgery  Appreciate Surg Onc, GYN Onc and IR input.  Pt is medically optimized for surgery and should proceed as scheduled.

## 2024-09-10 NOTE — CHART NOTE - NSCHARTNOTEFT_GEN_A_CORE
PGY4 Note    Patient scheduled for Ex-Lap on Thursday 9/12 in [facility].     Please ensure she will be NPO/IVF at midnight. And to have an active T&S drawn on 9/11.    On 9/11 in the afternoon, patient should start drinking Golytely 4L at noon. She may continue with a low residue diet at the same time.     Patient also needs IVC filter placement by IR on 9/11.    Thank you. PGY4 Note    Discussed with patient's first cousin that she patient is scheduled for Ex-Lap on Thursday 9/12 @0730 in [facility].     Please ensure she will be NPO/IVF at midnight. And to have an active T&S drawn on 9/11.    On 9/11 in the afternoon, patient should start drinking Golytely 4L at noon. She may continue with a low residue diet at the same time.     Patient also needs IVC filter placement by IR before noon on 9/11.     Lastly, please discontinue the heparin drip 6 hours preop.     Thank you.

## 2024-09-10 NOTE — PROGRESS NOTE ADULT - ASSESSMENT
79-year-old female with past medical history of prediabetes presents for right groin pain with radiation down anterior leg and constipation x 2 days.  Patient has arrived  from [facility] 1 week ago and has been staying with family who have noticed decreased appetite and complaints of right groin pain worsening when walking and decreased bowel movements despite utilizing prune juice and increasing fluids. Tylenol occasionally improves pain. Patient's daughter Queta notes patient underwent a recent surgery to her right colon in [facility], unsure what was done or what was removed but is concerned that her constipation and new pain could be related to the surgery. CTAP showed large multiloculated cystic mass extending from the pelvis into the abdomen as well as multiple fluid-filled cystic lesions within the abdomen concerning for malignancy with metastasis. Patient admitted for further work up as well as treatment for bilateral LE DVT.    # bilateral DVTs  - [facility] Duplex LE bilateral: Right lower extremity DVT in peroneal vein and gastrocnemius veins. Left lower extremity DVT in gastrocnemius, posterior tibial, and peroneal   veins.  - Patient is high risk given likely malignancy.  - c/w heparin full AC  - daily cbc  - transfuse for Hgb < 7, keep active T&S    #METASTATIC CANCER, possibly colon as primary   #Large multiloculated cystic mass with areas of soft tissue:  #Constipation   #Decreased Appetite  - ED vitals notable for  ,afebrile, on RA; ED labs notable for neutrophil 90.3%, potassium 5.2 hemolyzed   CTAP : Large multiloculated cystic mass with areas of soft tissue, measuring 24.7 x 12.5 x 22.3 cm, extending from the pelvis into the upper abdomen. Questionable filling defects in both the right and left common femoral vein and deep venous thrombosis is not excluded. Indeterminate multiple fluid-filled/cystic lesions within the abdomen measuring up to 6.5 cm, felt to likely be related to cystic metastatic disease. Soft tissue density within the transverse colon adjacent to the splenic flexure on image 52 series 2. Findings may be related to focal stool. Subcutaneous nodule measuring 1.8 cm and subcentimeter sclerotic focus in T12, metastatic disease is not excluded  - Dr Jefferson, reviewed records from [facility], pt was found to have metastatic cancer, possibly colon as primary. Was informed needed to start chemo, however there was no chemo available there at the time.  - pain control  - EKG for QTc, f/u   - Zofran PRN for vomiting   - Calorie count done- Magic Cup 2x/day (290 kcal, 9 gm protein/serving) to optimize PO intake. patient is at baseline PO intake as per family  - continue with Bowel regimen and monitor Bowel movement  - Ca 125 N, Ca 19-9 N, CEA elevated 4.8, Estradiol 15  - Iron 36, Transferrin 20, TIBC low 178, LDH normal, Haptoglobin 224, retics 1.6, RBC 3.28  - IR following, As per IR 24-48hrs prior IVC filter placement, awaiting date fixation for surgery by gynae  - Gyne following -  work on adding her on to the scheduled for possible explorative lap surgery, possible end of week  - GI following- as per GI, outpatient colonoscopy  - bowel regimen  - serial abdominal physical exams   - avoid opiates  - monitor BMs  - encourage ambulation - PT following  - medically cleared     # Hyponatremia  - most likely due to decreased PO intake   - Na 132 > 128 (9/9) on NS @ 75ml/hr  - calorie count, encourage po intake    #Mild Hydronephrosis  - seen on CTA/P    - creat 1.0, unknown baseline   - bladder scan q6 for rentention   - can consider urology c/s if condition worsen     #Small Pleural effusion - resolved  - seen on CT A/P  - on RA   - CXR this AM no cardiopulmonary disease  - monitor fluid balance and pulse ox    #AORTIC ANEURYSM  4.5 cm   - systolic blood pressure was 170 in ED  - monitor Blood pressure, has been wnl   - unable to confirm med rec  - will start with toprol xl 50mg daily  - atorvastatin 20mg daily    #Prediabetes  - FS  - ISS    DVT ppx: heparin drip  Diet: DASH   Activity: ATT     Progress note Handoff- plan for sugery by gyne, preop IVC filter, medical clearance and stratification.  F/u:  vitamin b12, folate, Retics, inhibin a/b, estradiol, TVUS       79-year-old female with past medical history of prediabetes presents for right groin pain with radiation down anterior leg and constipation x 2 days.  Patient has arrived  from [facility] 1 week ago and has been staying with family who have noticed decreased appetite and complaints of right groin pain worsening when walking and decreased bowel movements despite utilizing prune juice and increasing fluids. Tylenol occasionally improves pain. Patient's daughter Queta notes patient underwent a recent surgery to her right colon in [facility], unsure what was done or what was removed but is concerned that her constipation and new pain could be related to the surgery. CTAP showed large multiloculated cystic mass extending from the pelvis into the abdomen as well as multiple fluid-filled cystic lesions within the abdomen concerning for malignancy with metastasis. Patient admitted for further work up as well as treatment for bilateral LE DVT.    # bilateral DVTs  - [facility] Duplex LE bilateral: Right lower extremity DVT in peroneal vein and gastrocnemius veins. Left lower extremity DVT in gastrocnemius, posterior tibial, and peroneal   veins.  - Patient is high risk given likely malignancy.  - c/w heparin full AC  - daily cbc  - transfuse for Hgb < 7, keep active T&S    #METASTATIC CANCER, possibly colon as primary   #Large multiloculated cystic mass with areas of soft tissue:  #Constipation   #Decreased Appetite  - ED vitals notable for  ,afebrile, on RA; ED labs notable for neutrophil 90.3%, potassium 5.2 hemolyzed   CTAP : Large multiloculated cystic mass with areas of soft tissue, measuring 24.7 x 12.5 x 22.3 cm, extending from the pelvis into the upper abdomen. Questionable filling defects in both the right and left common femoral vein and deep venous thrombosis is not excluded. Indeterminate multiple fluid-filled/cystic lesions within the abdomen measuring up to 6.5 cm, felt to likely be related to cystic metastatic disease. Soft tissue density within the transverse colon adjacent to the splenic flexure on image 52 series 2. Findings may be related to focal stool. Subcutaneous nodule measuring 1.8 cm and subcentimeter sclerotic focus in T12, metastatic disease is not excluded  - Dr Jefferson, reviewed records from [facility], pt was found to have metastatic cancer, possibly colon as primary. Was informed needed to start chemo, however there was no chemo available there at the time.  - pain control  - EKG for QTc, f/u   - Zofran PRN for vomiting   - Calorie count done- Magic Cup 2x/day (290 kcal, 9 gm protein/serving) to optimize PO intake. patient is at baseline PO intake as per family  - continue with Bowel regimen and monitor Bowel movement  - Ca 125 N, Ca 19-9 N, CEA elevated 4.8, Estradiol 15  - Iron 36, Transferrin 20, TIBC low 178, LDH normal, Haptoglobin 224, retics 1.6, RBC 3.28  - IR following, As per IR ICV filter placement mariya 9/10, no need to hold AC.  - Gyne following - planning to schedule pelvic mass resection on friday, NPO/IVF at midnight. active T&S drawn on 9/11. On 9/11 in the afternoon, patient should start drinking Golytely 4L at noon  - GI following- as per GI, outpatient colonoscopy  - bowel regimen  - serial abdominal physical exams   - avoid opiates  - monitor BMs  - encourage ambulation - PT following  - medically cleared     # Hyponatremia  - most likely due to decreased PO intake   - Na 132 > 128 (9/9) on NS @ 75ml/hr  - calorie count, encourage po intake    #Mild Hydronephrosis  - seen on CTA/P    - creat 1.0, unknown baseline   - bladder scan q6 for rentention   - can consider urology c/s if condition worsen     #Small Pleural effusion - resolved  - seen on CT A/P  - on RA   - CXR this AM no cardiopulmonary disease  - monitor fluid balance and pulse ox    #AORTIC ANEURYSM  4.5 cm   - systolic blood pressure was 170 in ED  - monitor Blood pressure, has been wnl   - unable to confirm med rec  - will start with toprol xl 50mg daily  - atorvastatin 20mg daily    #Prediabetes  - FS  - ISS    DVT ppx: heparin drip  Diet: DASH   Activity: ATT     Progress note Handoff- IVC filter placement mariya, Follow up IR, Gyne  F/u:  vitamin b12, folate, Retics, inhibin a/b, estradiol, TVUS

## 2024-09-10 NOTE — CHART NOTE - NSCHARTNOTEFT_GEN_A_CORE
Surgical Oncology Chart Note    Patient planned for Ex-Lap on Thursday 9/12 @0730 in [facility] OR with Dr. Weiss.     Patient will need surgical bowel prep of 1L Go-Lytely on 9/11 as well as 1G Flagyl and 1G neomycin at 2PM, 3PM, and 11PM. Recommend clear liquid diet.     Preop labs 9/11.    Lastly, please discontinue the heparin drip 6 hours preop. Surgical Oncology Chart Note    Patient planned for Ex-Lap on Thursday 9/12 @0730 in [facility] OR with Dr. Weiss. Surgical onc team available for assistance in the OR.    Patient will need surgical bowel prep of 1L Go-Lytely on 9/11 as well as 1G Flagyl and 1G neomycin at 2PM, 3PM, and 11PM. Recommend clear liquid diet.     Preop labs 9/11.    Lastly, please discontinue the heparin drip 6 hours preop.

## 2024-09-10 NOTE — PROGRESS NOTE ADULT - SUBJECTIVE AND OBJECTIVE BOX
SKYLAR ORTIZ 79y Female  MRN#: [id number]     Hospital Day: 6d    Pt is currently admitted with the primary diagnosis of  Intra-abdominal and pelvic swelling of mass or lump        SUBJECTIVE     Overnight events  None    Subjective complaints  Pt was evaluated this am. Patient denied any active complaints and per patient his symptoms are improving                                            ----------------------------------------------------------  OBJECTIVE  PAST MEDICAL & SURGICAL HISTORY                                            -----------------------------------------------------------  ALLERGIES:  No Known Allergies                                            ------------------------------------------------------------    HOME MEDICATIONS  Home Medications:                           MEDICATIONS:  STANDING MEDICATIONS  acetaminophen     Tablet .. 650 milliGRAM(s) Oral every 8 hours  atorvastatin 20 milliGRAM(s) Oral at bedtime  chlorhexidine 2% Cloths 1 Application(s) Topical daily  dextrose 5%. 1000 milliLiter(s) IV Continuous <Continuous>  dextrose 5%. 1000 milliLiter(s) IV Continuous <Continuous>  dextrose 50% Injectable 12.5 Gram(s) IV Push once  dextrose 50% Injectable 25 Gram(s) IV Push once  dextrose 50% Injectable 25 Gram(s) IV Push once  glucagon  Injectable 1 milliGRAM(s) IntraMuscular once  heparin  Infusion.  Unit(s)/Hr IV Continuous <Continuous>  insulin lispro (ADMELOG) corrective regimen sliding scale   SubCutaneous three times a day before meals  metoprolol succinate ER 50 milliGRAM(s) Oral daily  polyethylene glycol 3350 17 Gram(s) Oral two times a day  senna 2 Tablet(s) Oral at bedtime  sodium chloride 0.9%. 1000 milliLiter(s) IV Continuous <Continuous>    PRN MEDICATIONS  dextrose Oral Gel 15 Gram(s) Oral once PRN  heparin   Injectable 5000 Unit(s) IV Push every 6 hours PRN  heparin   Injectable 2500 Unit(s) IV Push every 6 hours PRN  oxycodone    5 mG/acetaminophen 325 mG 1 Tablet(s) Oral every 6 hours PRN                                            ------------------------------------------------------------  VITAL SIGNS: Last 24 Hours  T(C): 36.9 (09 Sep 2024 12:56), Max: 36.9 (09 Sep 2024 12:56)  T(F): 98.5 (09 Sep 2024 12:56), Max: 98.5 (09 Sep 2024 12:56)  HR: 60 (09 Sep 2024 12:56) (59 - 62)  BP: 151/72 (09 Sep 2024 12:56) (117/67 - 168/81)  BP(mean): --  RR: 18 (09 Sep 2024 12:56) (18 - 18)  SpO2: 98% (09 Sep 2024 12:56) (98% - 98%)                                             --------------------------------------------------------------  LABS:                        8.5    3.58  )-----------( 240      ( 09 Sep 2024 07:24 )             26.5     09-09    128<L>  |  96<L>  |  16  ----------------------------<  87  4.5   |  23  |  1.2    Ca    8.1<L>      09 Sep 2024 07:24  Phos  4.2     09-09  Mg     2.0     09-09    TPro  5.2<L>  /  Alb  2.8<L>  /  TBili  0.3  /  DBili  x   /  AST  15  /  ALT  8   /  AlkPhos  66  09-09    PTT - ( 09 Sep 2024 04:10 )  PTT:67.7 sec  Urinalysis Basic - ( 09 Sep 2024 07:24 )    Color: x / Appearance: x / SG: x / pH: x  Gluc: 87 mg/dL / Ketone: x  / Bili: x / Urobili: x   Blood: x / Protein: x / Nitrite: x   Leuk Esterase: x / RBC: x / WBC x   Sq Epi: x / Non Sq Epi: x / Bacteria: x        PHYSICAL EXAM:  GENERAL: NAD, lying in bed comfortably  HEAD:  Atraumatic, Normocephalic  EYES: EOMI, conjunctiva and sclera clear  ENT: Moist mucous membranes  NECK: Supple, No JVD  CHEST/LUNG: Clear to auscultation bilaterally; No rales, rhonchi, wheezing, or rubs. Unlabored respirations  HEART: regular rate and rhythm; No murmurs, rubs, or gallops  ABDOMEN: Bowel sounds present; Soft, Nontender, Nondistended.    EXTREMITIES: Warm. No clubbing, cyanosis, or edema  NERVOUS SYSTEM:  Alert & Oriented X3. No focal deficits   SKIN: No rashes or lesions                                           --------------------------------------------------------------

## 2024-09-10 NOTE — CONSULT NOTE ADULT - SUBJECTIVE AND OBJECTIVE BOX
GENERAL SURGERY CONSULT NOTE    Patient: SKYLAR ORTIZ , 79y ([birth year])Female   MRN: [id number]  Location: [address]  Visit: 09-04-24 Inpatient  Date: 09-10-24 @ 01:50    HPI:  Patient is a 79y year old with PMHx of prediabetes and is s/p ex lap surgery in [facility], likely due to metastatic cancer, possibly primarily from colon who presented to the ED with right groin pain. Patient came back from [facility] last week and presented with constipation, groin pain radiating towards the leg, found to have a large multiloculated cystic measuring 24.7 x 12.5 x 22.3 cm, extending from the pelvis into the upper abdomen. Patient believes that about 2-3 months ago, patient had a surgery in [facility] where they opened her abdomen and noted the large mass and then closed it back up without any intervention. Reports decreased appetite and abdominal swelling for an unknown amount of time, does not believe it is recently worsening though. She does not have any further information than that. Reports lower extremity swelling that started after her flight from [facility] (which was 2 weeks ago), found to have bilateral LE DVTs here, currently on heparin drip. GYN team planning for intervention (resection) after clearances and medical records are obtained.       PAST MEDICAL & SURGICAL HISTORY:      Home Medications:        VITALS:  T(F): 97.6 (09-09-24 @ 19:33), Max: 98.5 (09-09-24 @ 12:56)  HR: 62 (09-09-24 @ 19:33) (59 - 62)  BP: 178/77 (09-09-24 @ 19:33) (117/67 - 178/77)  RR: 18 (09-09-24 @ 19:33) (18 - 18)  SpO2: 98% (09-09-24 @ 19:33) (98% - 98%)    PHYSICAL EXAM:  General: NAD, AAOx3, calm and cooperative  HEENT: NCAT, LAZARO, EOMI, Trachea ML, Neck supple  Cardiac: RRR S1, S2, no Murmurs, rubs or gallops  Respiratory: CTAB, normal respiratory effort, breath sounds equal BL, no wheeze, rhonchi or crackles  Abdomen: Soft, non-distended, non-tender, no rebound, no guarding. +BS.  Rectal: Good tone, +stool, no blood, no marcelino-anal masses/lesions, no fistulas, fissures, hemorrhoids  Musculoskeletal: Strength 5/5 BL UE/LE, ROM intact, compartments soft  Neuro: Sensation grossly intact and equal throughout, no focal deficits  Vascular: Pulses 2+ throughout, extremities well perfused  Skin: Warm/dry, normal color, no jaundice  Incision/wound: healing well, dressings in place, clean, dry and intact    MEDICATIONS  (STANDING):  acetaminophen     Tablet .. 650 milliGRAM(s) Oral every 8 hours  atorvastatin 20 milliGRAM(s) Oral at bedtime  chlorhexidine 2% Cloths 1 Application(s) Topical daily  dextrose 5%. 1000 milliLiter(s) (50 mL/Hr) IV Continuous <Continuous>  dextrose 5%. 1000 milliLiter(s) (100 mL/Hr) IV Continuous <Continuous>  dextrose 50% Injectable 12.5 Gram(s) IV Push once  dextrose 50% Injectable 25 Gram(s) IV Push once  dextrose 50% Injectable 25 Gram(s) IV Push once  glucagon  Injectable 1 milliGRAM(s) IntraMuscular once  heparin  Infusion.  Unit(s)/Hr (11 mL/Hr) IV Continuous <Continuous>  hydrALAZINE 50 milliGRAM(s) Oral every 12 hours  insulin lispro (ADMELOG) corrective regimen sliding scale   SubCutaneous three times a day before meals  metoprolol succinate ER 50 milliGRAM(s) Oral daily  polyethylene glycol 3350 17 Gram(s) Oral two times a day  senna 2 Tablet(s) Oral at bedtime  sodium chloride 0.9%. 1000 milliLiter(s) (75 mL/Hr) IV Continuous <Continuous>    MEDICATIONS  (PRN):  dextrose Oral Gel 15 Gram(s) Oral once PRN Blood Glucose LESS THAN 70 milliGRAM(s)/deciliter  heparin   Injectable 5000 Unit(s) IV Push every 6 hours PRN For aPTT less than 40  heparin   Injectable 2500 Unit(s) IV Push every 6 hours PRN For aPTT between 40 - 57  oxycodone    5 mG/acetaminophen 325 mG 1 Tablet(s) Oral every 6 hours PRN Severe Pain (7 - 10)      LAB/STUDIES:                        8.4    3.77  )-----------( 240      ( 09 Sep 2024 21:04 )             25.6     09-09    130<L>  |  99  |  15  ----------------------------<  117<H>  4.5   |  23  |  1.1    Ca    7.9<L>      09 Sep 2024 21:04  Phos  4.2     09-09  Mg     2.0     09-09    TPro  5.2<L>  /  Alb  2.8<L>  /  TBili  0.3  /  DBili  x   /  AST  15  /  ALT  8   /  AlkPhos  66  09-09    PTT - ( 09 Sep 2024 04:10 )  PTT:67.7 sec  LIVER FUNCTIONS - ( 09 Sep 2024 07:24 )  Alb: 2.8 g/dL / Pro: 5.2 g/dL / ALK PHOS: 66 U/L / ALT: 8 U/L / AST: 15 U/L / GGT: x           Urinalysis Basic - ( 09 Sep 2024 21:04 )    Color: x / Appearance: x / SG: x / pH: x  Gluc: 117 mg/dL / Ketone: x  / Bili: x / Urobili: x   Blood: x / Protein: x / Nitrite: x   Leuk Esterase: x / RBC: x / WBC x   Sq Epi: x / Non Sq Epi: x / Bacteria: x      IMAGING:  < from: CT Abdomen and Pelvis w/ Oral Cont and w/ IV Cont (09.04.24 @ 15:17) >  IMPRESSION:    Large multiloculated cystic mass with areas of soft tissue, measuring   24.7 x 12.5 x 22.3 cm, extending from the pelvis into the upper abdomen.    Findings are concerning for malignancy; felt to likely be gynecologic in   etiology, but this mass remains indeterminate.    Questionable filling defects in both the right and left common femoral   vein and deep venous thrombosis is not excluded.    Further evaluation with ultrasound is recommended.    Mild bilateral hydronephrosis    Indeterminate multiple fluid-filled/cystic lesions within the abdomen   measuring up to 6.5 cm, felt to likely be related to cystic metastatic   disease    Soft tissue density within the transverse colon adjacent to the splenic   flexure on image 52 series 2. Findings may be related to focal stool.   Underlying neoplasm cannot be excluded.    Subcutaneous nodule measuring 1.8 cm and subcentimeter sclerotic focus in   T12, metastatic disease is not excluded    Small rightpleural effusion.    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        ASSESSMENT:  Patient is a 79y year old with PMHx of prediabetes and is s/p ex lap surgery in [facility], likely due to metastatic cancer, possibly primarily from colon who presented to the ED with right groin pain. Patient came back from [facility] last week and presented with constipation, groin pain radiating towards the leg, found to have a large multiloculated cystic measuring 24.7 x 12.5 x 22.3 cm, extending from the pelvis into the upper abdomen. Patient believes that about 2-3 months ago, patient had a surgery in [facility] where they opened her abdomen and noted the large mass and then closed it back up without any intervention. Reports decreased appetite and abdominal swelling for an unknown amount of time, does not believe it is recently worsening though. She does not have any further information than that. Reports lower extremity swelling that started after her flight from [facility] (which was 2 weeks ago), found to have bilateral LE DVTs here, currently on heparin drip. GYN team planning for intervention (resection) after clearances and medical records are obtained.     PLAN:  - Case and plan discussed with surgical attending Dr. Shi  - Patient is planned to be going for pelvic mass resection with GYN team next week vs later this week pending clearance/availability.  - Surgical oncologist Dr. Shi will be available to assist in the OR during the resection as needed.  - Pending medical optimization/clearance.  - Tumor markers noted, CEA mildly elevated 4.8.  - Hep GTT for b/l LE DVTs- will need IVC filter prior to OR.  - Surgery to follow  - Heme/onc following  - Rest of care per primary team      Above plan discussed with Attending Surgeon Dr. Shi  09-10-24 @ 01:50   GENERAL SURGERY CONSULT NOTE    Patient: SKYLAR ORTIZ , 79y ([birth year])Female   MRN: [id number]  Location: [address]  Visit: 09-04-24 Inpatient  Date: 09-10-24 @ 01:50    HPI:  Patient is a 79y year old with PMHx of prediabetes and is s/p ex lap surgery in [facility], likely due to metastatic cancer, possibly primarily from colon who presented to the ED with right groin pain. Patient came back from [facility] last week and presented with constipation, groin pain radiating towards the leg, found to have a large multiloculated cystic measuring 24.7 x 12.5 x 22.3 cm, extending from the pelvis into the upper abdomen. Patient believes that about 2-3 months ago, patient had a surgery in [facility] where they opened her abdomen and noted the large mass and then closed it back up without any intervention. Reports decreased appetite and abdominal swelling for an unknown amount of time, does not believe it is recently worsening though. She does not have any further information than that. Reports lower extremity swelling that started after her flight from [facility] (which was 2 weeks ago), found to have bilateral LE DVTs here, currently on heparin drip. GYN team planning for intervention (resection) after clearances and medical records are obtained.       PAST MEDICAL & SURGICAL HISTORY:      Home Medications:        VITALS:  T(F): 97.6 (09-09-24 @ 19:33), Max: 98.5 (09-09-24 @ 12:56)  HR: 62 (09-09-24 @ 19:33) (59 - 62)  BP: 178/77 (09-09-24 @ 19:33) (117/67 - 178/77)  RR: 18 (09-09-24 @ 19:33) (18 - 18)  SpO2: 98% (09-09-24 @ 19:33) (98% - 98%)    PHYSICAL EXAM:  General: NAD, calm and cooperative, [place] speaking  Abdomen: Abdomen distended with large suprapubic and hypogastric palpable mass noted extending to b/l lower quadrants, non-tender, no rebound, no guarding.   Vascular: Pulses 2+ throughout, extremities well perfused  Skin: Warm/dry, normal color, no jaundice    MEDICATIONS  (STANDING):  acetaminophen     Tablet .. 650 milliGRAM(s) Oral every 8 hours  atorvastatin 20 milliGRAM(s) Oral at bedtime  chlorhexidine 2% Cloths 1 Application(s) Topical daily  dextrose 5%. 1000 milliLiter(s) (50 mL/Hr) IV Continuous <Continuous>  dextrose 5%. 1000 milliLiter(s) (100 mL/Hr) IV Continuous <Continuous>  dextrose 50% Injectable 12.5 Gram(s) IV Push once  dextrose 50% Injectable 25 Gram(s) IV Push once  dextrose 50% Injectable 25 Gram(s) IV Push once  glucagon  Injectable 1 milliGRAM(s) IntraMuscular once  heparin  Infusion.  Unit(s)/Hr (11 mL/Hr) IV Continuous <Continuous>  hydrALAZINE 50 milliGRAM(s) Oral every 12 hours  insulin lispro (ADMELOG) corrective regimen sliding scale   SubCutaneous three times a day before meals  metoprolol succinate ER 50 milliGRAM(s) Oral daily  polyethylene glycol 3350 17 Gram(s) Oral two times a day  senna 2 Tablet(s) Oral at bedtime  sodium chloride 0.9%. 1000 milliLiter(s) (75 mL/Hr) IV Continuous <Continuous>    MEDICATIONS  (PRN):  dextrose Oral Gel 15 Gram(s) Oral once PRN Blood Glucose LESS THAN 70 milliGRAM(s)/deciliter  heparin   Injectable 5000 Unit(s) IV Push every 6 hours PRN For aPTT less than 40  heparin   Injectable 2500 Unit(s) IV Push every 6 hours PRN For aPTT between 40 - 57  oxycodone    5 mG/acetaminophen 325 mG 1 Tablet(s) Oral every 6 hours PRN Severe Pain (7 - 10)      LAB/STUDIES:                        8.4    3.77  )-----------( 240      ( 09 Sep 2024 21:04 )             25.6     09-09    130<L>  |  99  |  15  ----------------------------<  117<H>  4.5   |  23  |  1.1    Ca    7.9<L>      09 Sep 2024 21:04  Phos  4.2     09-09  Mg     2.0     09-09    TPro  5.2<L>  /  Alb  2.8<L>  /  TBili  0.3  /  DBili  x   /  AST  15  /  ALT  8   /  AlkPhos  66  09-09    PTT - ( 09 Sep 2024 04:10 )  PTT:67.7 sec  LIVER FUNCTIONS - ( 09 Sep 2024 07:24 )  Alb: 2.8 g/dL / Pro: 5.2 g/dL / ALK PHOS: 66 U/L / ALT: 8 U/L / AST: 15 U/L / GGT: x           Urinalysis Basic - ( 09 Sep 2024 21:04 )    Color: x / Appearance: x / SG: x / pH: x  Gluc: 117 mg/dL / Ketone: x  / Bili: x / Urobili: x   Blood: x / Protein: x / Nitrite: x   Leuk Esterase: x / RBC: x / WBC x   Sq Epi: x / Non Sq Epi: x / Bacteria: x      IMAGING:  < from: CT Abdomen and Pelvis w/ Oral Cont and w/ IV Cont (09.04.24 @ 15:17) >  IMPRESSION:    Large multiloculated cystic mass with areas of soft tissue, measuring   24.7 x 12.5 x 22.3 cm, extending from the pelvis into the upper abdomen.    Findings are concerning for malignancy; felt to likely be gynecologic in   etiology, but this mass remains indeterminate.    Questionable filling defects in both the right and left common femoral   vein and deep venous thrombosis is not excluded.    Further evaluation with ultrasound is recommended.    Mild bilateral hydronephrosis    Indeterminate multiple fluid-filled/cystic lesions within the abdomen   measuring up to 6.5 cm, felt to likely be related to cystic metastatic   disease    Soft tissue density within the transverse colon adjacent to the splenic   flexure on image 52 series 2. Findings may be related to focal stool.   Underlying neoplasm cannot be excluded.    Subcutaneous nodule measuring 1.8 cm and subcentimeter sclerotic focus in   T12, metastatic disease is not excluded    Small rightpleural effusion.    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        ASSESSMENT:  Patient is a 79y year old with PMHx of prediabetes and is s/p ex lap surgery in [facility], likely due to metastatic cancer, possibly primarily from colon who presented to the ED with right groin pain. Patient came back from [facility] last week and presented with constipation, groin pain radiating towards the leg, found to have a large multiloculated cystic measuring 24.7 x 12.5 x 22.3 cm, extending from the pelvis into the upper abdomen. Patient believes that about 2-3 months ago, patient had a surgery in [facility] where they opened her abdomen and noted the large mass and then closed it back up without any intervention. Reports decreased appetite and abdominal swelling for an unknown amount of time, does not believe it is recently worsening though. She does not have any further information than that. Reports lower extremity swelling that started after her flight from [facility] (which was 2 weeks ago), found to have bilateral LE DVTs here, currently on heparin drip. GYN team planning for intervention (resection) after clearances and medical records are obtained.     PLAN:  - Case and plan discussed with surgical attending Dr. Shi  - Patient is planned to be going for pelvic mass resection with GYN team next week vs later this week pending clearance/availability.  - Surgical oncologist Dr. Shi will be available to assist in the OR during the resection as needed.  - Pending medical optimization/clearance.  - Tumor markers noted, CEA mildly elevated 4.8.  - Hep GTT for b/l LE DVTs- will need IVC filter prior to OR.  - Surgery to follow  - Heme/onc following  - Rest of care per primary team      Above plan discussed with Attending Surgeon Dr. Shi  09-10-24 @ 01:50

## 2024-09-10 NOTE — CHART NOTE - NSCHARTNOTEFT_GEN_A_CORE
IR is consulted for IVC filter placement pre-operatively.    Patient is added on for IVC filter placement tomorrow 9/11/2024    -Heparin drip does not need to be held.     Please call Interventional Radiology with questions or concerns:   - M-F [id number]: [phone]   - All other hours: [phone]

## 2024-09-11 LAB
ANION GAP SERPL CALC-SCNC: 11 MMOL/L — SIGNIFICANT CHANGE UP (ref 7–14)
APTT BLD: 153.5 SEC — CRITICAL HIGH (ref 27–39.2)
APTT BLD: 56.8 SEC — HIGH (ref 27–39.2)
BUN SERPL-MCNC: 14 MG/DL — SIGNIFICANT CHANGE UP (ref 10–20)
CALCIUM SERPL-MCNC: 8.3 MG/DL — LOW (ref 8.4–10.4)
CHLORIDE SERPL-SCNC: 99 MMOL/L — SIGNIFICANT CHANGE UP (ref 98–110)
CO2 SERPL-SCNC: 24 MMOL/L — SIGNIFICANT CHANGE UP (ref 17–32)
CREAT SERPL-MCNC: 1.1 MG/DL — SIGNIFICANT CHANGE UP (ref 0.7–1.5)
EGFR: 52 ML/MIN/1.73M2 — LOW
GLUCOSE BLDC GLUCOMTR-MCNC: 100 MG/DL — HIGH (ref 70–99)
GLUCOSE BLDC GLUCOMTR-MCNC: 94 MG/DL — SIGNIFICANT CHANGE UP (ref 70–99)
GLUCOSE BLDC GLUCOMTR-MCNC: 99 MG/DL — SIGNIFICANT CHANGE UP (ref 70–99)
GLUCOSE BLDC GLUCOMTR-MCNC: 99 MG/DL — SIGNIFICANT CHANGE UP (ref 70–99)
GLUCOSE SERPL-MCNC: 92 MG/DL — SIGNIFICANT CHANGE UP (ref 70–99)
HCT VFR BLD CALC: 26 % — LOW (ref 37–47)
HGB BLD-MCNC: 8.6 G/DL — LOW (ref 12–16)
INR BLD: 1.02 RATIO — SIGNIFICANT CHANGE UP (ref 0.65–1.3)
MAGNESIUM SERPL-MCNC: 1.9 MG/DL — SIGNIFICANT CHANGE UP (ref 1.8–2.4)
MCHC RBC-ENTMCNC: 28.1 PG — SIGNIFICANT CHANGE UP (ref 27–31)
MCHC RBC-ENTMCNC: 33.1 G/DL — SIGNIFICANT CHANGE UP (ref 32–37)
MCV RBC AUTO: 85 FL — SIGNIFICANT CHANGE UP (ref 81–99)
NRBC # BLD: 0 /100 WBCS — SIGNIFICANT CHANGE UP (ref 0–0)
PLATELET # BLD AUTO: 233 K/UL — SIGNIFICANT CHANGE UP (ref 130–400)
PMV BLD: 11 FL — HIGH (ref 7.4–10.4)
POTASSIUM SERPL-MCNC: 4.4 MMOL/L — SIGNIFICANT CHANGE UP (ref 3.5–5)
POTASSIUM SERPL-SCNC: 4.4 MMOL/L — SIGNIFICANT CHANGE UP (ref 3.5–5)
PROTHROM AB SERPL-ACNC: 11.6 SEC — SIGNIFICANT CHANGE UP (ref 9.95–12.87)
RBC # BLD: 3.06 M/UL — LOW (ref 4.2–5.4)
RBC # FLD: 16.8 % — HIGH (ref 11.5–14.5)
SODIUM SERPL-SCNC: 134 MMOL/L — LOW (ref 135–146)
WBC # BLD: 3.44 K/UL — LOW (ref 4.8–10.8)
WBC # FLD AUTO: 3.44 K/UL — LOW (ref 4.8–10.8)

## 2024-09-11 PROCEDURE — 37191 INS ENDOVAS VENA CAVA FILTR: CPT

## 2024-09-11 RX ORDER — HYDRALAZINE HCL 50 MG
50 TABLET ORAL ONCE
Refills: 0 | Status: COMPLETED | OUTPATIENT
Start: 2024-09-11 | End: 2024-09-11

## 2024-09-11 RX ORDER — POLYETHYLENE GLYCOL 3350, SODIUM SULFATE ANHYDROUS, SODIUM BICARBONATE, SODIUM CHLORIDE, POTASSIUM CHLORIDE 236; 22.74; 6.74; 5.86; 2.97 G/4L; G/4L; G/4L; G/4L; G/4L
1000 POWDER, FOR SOLUTION ORAL ONCE
Refills: 0 | Status: COMPLETED | OUTPATIENT
Start: 2024-09-11 | End: 2024-09-11

## 2024-09-11 RX ADMIN — POLYETHYLENE GLYCOL 3350, SODIUM SULFATE ANHYDROUS, SODIUM BICARBONATE, SODIUM CHLORIDE, POTASSIUM CHLORIDE 1000 MILLILITER(S): 236; 22.74; 6.74; 5.86; 2.97 POWDER, FOR SOLUTION ORAL at 13:21

## 2024-09-11 RX ADMIN — Medication 1000 MILLIGRAM(S): at 22:23

## 2024-09-11 RX ADMIN — Medication 50 MILLIGRAM(S): at 15:55

## 2024-09-11 RX ADMIN — ACETAMINOPHEN 650 MILLIGRAM(S): 325 TABLET ORAL at 21:45

## 2024-09-11 RX ADMIN — Medication 125 MILLILITER(S): at 14:50

## 2024-09-11 RX ADMIN — Medication 20 MILLIGRAM(S): at 21:14

## 2024-09-11 RX ADMIN — ACETAMINOPHEN 650 MILLIGRAM(S): 325 TABLET ORAL at 13:22

## 2024-09-11 RX ADMIN — Medication 900 UNIT(S)/HR: at 08:32

## 2024-09-11 RX ADMIN — Medication 50 MILLIGRAM(S): at 05:57

## 2024-09-11 RX ADMIN — Medication 1000 MILLIGRAM(S): at 13:21

## 2024-09-11 RX ADMIN — ACETAMINOPHEN 650 MILLIGRAM(S): 325 TABLET ORAL at 05:57

## 2024-09-11 RX ADMIN — CHLORHEXIDINE GLUCONATE 1 APPLICATION(S): 40 SOLUTION TOPICAL at 13:30

## 2024-09-11 RX ADMIN — ACETAMINOPHEN 650 MILLIGRAM(S): 325 TABLET ORAL at 13:52

## 2024-09-11 RX ADMIN — METOPROLOL TARTRATE 50 MILLIGRAM(S): 100 TABLET ORAL at 05:57

## 2024-09-11 RX ADMIN — Medication 1000 MILLIGRAM(S): at 14:50

## 2024-09-11 RX ADMIN — Medication 2500 UNIT(S): at 10:25

## 2024-09-11 RX ADMIN — Medication 1000 UNIT(S)/HR: at 10:20

## 2024-09-11 RX ADMIN — ACETAMINOPHEN 650 MILLIGRAM(S): 325 TABLET ORAL at 21:15

## 2024-09-11 RX ADMIN — Medication 1000 UNIT(S)/HR: at 19:27

## 2024-09-11 NOTE — PROGRESS NOTE ADULT - SUBJECTIVE AND OBJECTIVE BOX
Had a long conversation with patient with the use of [place] .  Discussed the proposed surgery as well as the management in the perioperative period (IVC filter, bowel prep).  Risks discussed: bleeding, infection and injury especially to the urinary tract. Also chance of bowel involvement possible resection. and reanastomosis.  All questions answered, she is in agreement with the plan.   Medically optimized.

## 2024-09-11 NOTE — PROGRESS NOTE ADULT - SUBJECTIVE AND OBJECTIVE BOX
SKYLAR ORTIZ 79y Female  MRN#: [id number]     Hospital Day: 7d    Pt is currently admitted with the primary diagnosis of  Intra-abdominal and pelvic swelling of mass or lump        SUBJECTIVE     Overnight events  None    Subjective complaints  Pt was evaluated this am. Patient denied any active complaints and per patient his symptoms are improving                                            ----------------------------------------------------------  OBJECTIVE  PAST MEDICAL & SURGICAL HISTORY                                            -----------------------------------------------------------  ALLERGIES:  No Known Allergies                                            ------------------------------------------------------------    HOME MEDICATIONS  Home Medications:                           MEDICATIONS:  STANDING MEDICATIONS  acetaminophen     Tablet .. 650 milliGRAM(s) Oral every 8 hours  atorvastatin 20 milliGRAM(s) Oral at bedtime  chlorhexidine 2% Cloths 1 Application(s) Topical daily  dextrose 5%. 1000 milliLiter(s) IV Continuous <Continuous>  dextrose 5%. 1000 milliLiter(s) IV Continuous <Continuous>  dextrose 50% Injectable 12.5 Gram(s) IV Push once  dextrose 50% Injectable 25 Gram(s) IV Push once  dextrose 50% Injectable 25 Gram(s) IV Push once  glucagon  Injectable 1 milliGRAM(s) IntraMuscular once  heparin  Infusion. 900 Unit(s)/Hr IV Continuous <Continuous>  hydrALAZINE 50 milliGRAM(s) Oral every 12 hours  insulin lispro (ADMELOG) corrective regimen sliding scale   SubCutaneous three times a day before meals  metoprolol succinate ER 50 milliGRAM(s) Oral daily  metroNIDAZOLE    Tablet 1000 milliGRAM(s) Oral <User Schedule>  neomycin 1000 milliGRAM(s) Oral <User Schedule>  polyethylene glycol 3350 17 Gram(s) Oral two times a day  polyethylene glycol/electrolyte Solution. 1000 milliLiter(s) Oral once  senna 2 Tablet(s) Oral at bedtime  sodium chloride 0.9%. 1000 milliLiter(s) IV Continuous <Continuous>    PRN MEDICATIONS  dextrose Oral Gel 15 Gram(s) Oral once PRN  heparin   Injectable 5000 Unit(s) IV Push every 6 hours PRN  heparin   Injectable 2500 Unit(s) IV Push every 6 hours PRN  oxycodone    5 mG/acetaminophen 325 mG 1 Tablet(s) Oral every 6 hours PRN                                            ------------------------------------------------------------  VITAL SIGNS: Last 24 Hours  T(C): 36.5 (11 Sep 2024 05:08), Max: 36.7 (10 Sep 2024 12:19)  T(F): 97.7 (11 Sep 2024 05:08), Max: 98.1 (10 Sep 2024 12:19)  HR: 62 (11 Sep 2024 05:08) (55 - 62)  BP: 196/82 (11 Sep 2024 05:08) (129/64 - 196/82)  BP(mean): --  RR: 17 (11 Sep 2024 05:08) (16 - 18)  SpO2: --      09-09-24 @ 07:01  -  09-10-24 @ 07:00  --------------------------------------------------------  IN: 1092 mL / OUT: 0 mL / NET: 1092 mL                                             --------------------------------------------------------------  LABS:                        9.3    3.16  )-----------( 233      ( 10 Sep 2024 08:29 )             29.1     09-10    130<L>  |  98  |  15  ----------------------------<  95  4.4   |  22  |  1.0    Ca    8.2<L>      10 Sep 2024 08:29  Phos  4.2     09-09  Mg     1.9     09-10    TPro  5.2<L>  /  Alb  2.8<L>  /  TBili  0.3  /  DBili  x   /  AST  15  /  ALT  8   /  AlkPhos  66  09-09    PTT - ( 10 Sep 2024 08:29 )  PTT:86.1 sec  Urinalysis Basic - ( 10 Sep 2024 08:29 )    Color: x / Appearance: x / SG: x / pH: x  Gluc: 95 mg/dL / Ketone: x  / Bili: x / Urobili: x   Blood: x / Protein: x / Nitrite: x   Leuk Esterase: x / RBC: x / WBC x   Sq Epi: x / Non Sq Epi: x / Bacteria: x                                                  -------------------------------------------------------------  RADIOLOGY:    US Transvaginal (09.06.24 @ 14:46) >  Large heterogeneous mass with measuring 22.6 x 16.9 x 20.3 cm within the   pelvic cavity. The mass obscures visualization of the uterus and ovaries.     VA Duplex Lower Ext Vein Scan, Bilat (09.04.24 @ 19:09) >  Right lower extremity DVT in peroneal vein and gastrocnemius veins.    Left lower extremity DVT in gastrocnemius, posterior tibial, and peroneal   veins.      < end of copied text >                                            --------------------------------------------------------------    PHYSICAL EXAM:  GENERAL: NAD, lying in bed comfortably  HEAD:  Atraumatic, Normocephalic  EYES: EOMI, conjunctiva and sclera clear  ENT: Moist mucous membranes  NECK: Supple, No JVD  CHEST/LUNG: Clear to auscultation bilaterally; No rales, rhonchi, wheezing, or rubs. Unlabored respirations  HEART: regular rate and rhythm; No murmurs, rubs, or gallops  ABDOMEN: Bowel sounds present; Soft, Nontender, Nondistended.    EXTREMITIES: Warm. No clubbing, cyanosis, or edema  NERVOUS SYSTEM:  Alert & Oriented X3. No focal deficits   SKIN: No rashes or lesions                                           --------------------------------------------------------------

## 2024-09-11 NOTE — PROGRESS NOTE ADULT - SUBJECTIVE AND OBJECTIVE BOX
INTERVENTIONAL RADIOLOGY BRIEF-OPERATIVE NOTE    Procedure: IVC filter placement    Pre-Op Diagnosis: DVT    Post-Op Diagnosis: same as pre    Attending: Mariah  Resident:     Anesthesia (type):  [ ] General Anesthesia  [ ] Deep Sedation - provided by anesthesiologist  [ ] Conscious Sedation -  Versed and Fentanyl   [ x] Local/Regional    Estimated Blood Loss: minimal <5cc    Condition:   [ ] Critical  [ ] Serious  [ ] Fair   [x ] Good    Findings/Follow up Plan of Care: Retrievable IVC filter placement    Specimens Removed: none    Implants: as above    Complications: no acute    Disposition: back to room.       Please call Interventional Radiology [phone] with any questions, concerns, or issues.

## 2024-09-11 NOTE — PROGRESS NOTE ADULT - ASSESSMENT
79-year-old female with past medical history of prediabetes presents for right groin pain with radiation down anterior leg and constipation x 2 days.  Patient has arrived  from [facility] 1 week ago and has been staying with family who have noticed decreased appetite and complaints of right groin pain worsening when walking and decreased bowel movements despite utilizing prune juice and increasing fluids. Tylenol occasionally improves pain. Patient's daughter Queta notes patient underwent a recent surgery to her right colon in [facility], unsure what was done or what was removed but is concerned that her constipation and new pain could be related to the surgery. CTAP showed large multiloculated cystic mass extending from the pelvis into the abdomen as well as multiple fluid-filled cystic lesions within the abdomen concerning for malignancy with metastasis. Patient admitted for further work up as well as treatment for bilateral LE DVT.    # bilateral DVTs  - VA Duplex LE bilateral: Right lower extremity DVT in peroneal vein and gastrocnemius veins. Left lower extremity DVT in gastrocnemius, posterior tibial, and peroneal   veins.  - Patient is high risk given likely malignancy.  - c/w heparin full AC  - daily cbc  - transfuse for Hgb < 7, keep active T&S    #METASTATIC CANCER, possibly colon as primary   #Large multiloculated cystic mass with areas of soft tissue:  #Constipation   #Decreased Appetite  - ED vitals notable for  ,afebrile, on RA; ED labs notable for neutrophil 90.3%, potassium 5.2 hemolyzed   CTAP : Large multiloculated cystic mass with areas of soft tissue, measuring 24.7 x 12.5 x 22.3 cm, extending from the pelvis into the upper abdomen. Questionable filling defects in both the right and left common femoral vein and deep venous thrombosis is not excluded. Indeterminate multiple fluid-filled/cystic lesions within the abdomen measuring up to 6.5 cm, felt to likely be related to cystic metastatic disease. Soft tissue density within the transverse colon adjacent to the splenic flexure on image 52 series 2. Findings may be related to focal stool. Subcutaneous nodule measuring 1.8 cm and subcentimeter sclerotic focus in T12, metastatic disease is not excluded  - Dr Jefferson, reviewed records from [facility], pt was found to have metastatic cancer, possibly colon as primary. Was informed needed to start chemo, however there was no chemo available there at the time.  - pain control  - EKG for QTc, f/u   - Zofran PRN for vomiting   - Calorie count done- Magic Cup 2x/day (290 kcal, 9 gm protein/serving) to optimize PO intake. patient is at baseline PO intake as per family  - continue with Bowel regimen and monitor Bowel movement  - Ca 125 N, Ca 19-9 N, CEA elevated 4.8, Estradiol 15  - Iron 36, Transferrin 20, TIBC low 178, LDH normal, Haptoglobin 224, retics 1.6, RBC 3.28  - IR following, As per IR ICV filter placement mariya 9/10, no need to hold AC.  - Gyne following - planning to schedule pelvic mass resection on friday, NPO/IVF at midnight. active T&S drawn on 9/11. On 9/11 in the afternoon, patient should start drinking Golytely 4L at noon  - GI following- as per GI, outpatient colonoscopy  - bowel regimen  - serial abdominal physical exams   - avoid opiates  - monitor BMs  - encourage ambulation - PT following  - medically cleared     # Hyponatremia  - most likely due to decreased PO intake   - Na 132 > 128 (9/9) on NS @ 75ml/hr  - calorie count, encourage po intake    #Mild Hydronephrosis  - seen on CTA/P    - creat 1.0, unknown baseline   - bladder scan q6 for rentention   - can consider urology c/s if condition worsen     #Small Pleural effusion - resolved  - seen on CT A/P  - on RA   - CXR this AM no cardiopulmonary disease  - monitor fluid balance and pulse ox    #AORTIC ANEURYSM  4.5 cm   - systolic blood pressure was 170 in ED  - monitor Blood pressure, has been wnl   - unable to confirm med rec  - will start with toprol xl 50mg daily  - atorvastatin 20mg daily    #Prediabetes  - FS  - ISS    DVT ppx: heparin drip  Diet: DASH   Activity: ATT     Progress note Handoff- IVC filter placement mariya, Follow up IR, Gyne  F/u:  vitamin b12, folate, Retics, inhibin a/b, estradiol, TVUS       SKYLAR ORTIZ is a 79y year old with PMHx of prediabetes and is s/p ex lap surgery in [facility], likely due to metastatic cancer, possibly primarily from colon who presented to the ED with right groin pain. Patient came back from [facility] last week and presented with constipation, groin pain radiating towards the leg, found to have a large multiloculated cystic measuring 24.7 x 12.5 x 22.3 cm, extending from the pelvis into the upper abdomen. Patient believes that about 2-3 months ago, patient had a surgery in [facility] where they opened her abdomen and noted the large mass and then closed it back up without any intervention.    # bilateral  LE DVTs  - VA Duplex LE bilateral: Right lower extremity DVT in peroneal vein and gastrocnemius veins. Left lower extremity DVT in gastrocnemius, posterior tibial, and peroneal   veins.  - Patient is high risk given likely malignancy.  - c/w heparin full AC  - daily cbc  - transfuse for Hgb < 7, keep active T&S    #METASTATIC CANCER, possibly colon as primary   #Large multiloculated cystic mass with areas of soft tissue:  #Constipation   #Decreased Appetite  - ED vitals notable for  ,afebrile, on RA; ED labs notable for neutrophil 90.3%, potassium 5.2 hemolyzed   CTAP : Large multiloculated cystic mass with areas of soft tissue, measuring 24.7 x 12.5 x 22.3 cm, extending from the pelvis into the upper abdomen. Questionable filling defects in both the right and left common femoral vein and deep venous thrombosis is not excluded. Indeterminate multiple fluid-filled/cystic lesions within the abdomen measuring up to 6.5 cm, felt to likely be related to cystic metastatic disease. Soft tissue density within the transverse colon adjacent to the splenic flexure on image 52 series 2. Findings may be related to focal stool. Subcutaneous nodule measuring 1.8 cm and subcentimeter sclerotic focus in T12, metastatic disease is not excluded  - Dr Jefferson, reviewed records from [facility], pt was found to have metastatic cancer, possibly colon as primary. Was informed needed to start chemo, however there was no chemo available there at the time.  - pain control  - EKG for QTc, f/u   - Zofran PRN for vomiting   - Calorie count done- Magic Cup 2x/day (290 kcal, 9 gm protein/serving) to optimize PO intake. patient is at baseline PO intake as per family  - continue with Bowel regimen and monitor Bowel movement  - Ca 125 N, Ca 19-9 N, CEA elevated 4.8, Estradiol 15  - Iron 36, Transferrin 20, TIBC low 178, LDH normal, Haptoglobin 224, retics 1.6, RBC 3.28  - IR following, IVC filter placed today  - Gyne following - planning to schedule pelvic mass resection mariya 9/11, NPO/IVF at midnight. active T&S drawn on 9/11. On 9/11 in the afternoon, patient should start drinking Golytely 4L at noon  - GI following- as per GI, outpatient colonoscopy  - bowel regimen  - serial abdominal physical exams   - avoid opiates  - monitor BMs  - encourage ambulation - PT following  - medically cleared     # Hyponatremia  - most likely due to decreased PO intake   - Na 132 > 128 (9/9) on NS @ 75ml/hr  - calorie count, encourage po intake    #Mild Hydronephrosis  - seen on CTA/P    - creat 1.0, unknown baseline   - bladder scan q6 for rentention   - can consider urology c/s if condition worsen     #Small Pleural effusion - resolved  - seen on CT A/P  - on RA   - CXR this AM no cardiopulmonary disease  - monitor fluid balance and pulse ox    #AORTIC ANEURYSM  4.5 cm   - systolic blood pressure was 170 in ED  - monitor Blood pressure, has been wnl   - unable to confirm med rec  - will start with toprol xl 50mg daily  - atorvastatin 20mg daily    # HTN  - continue with troprol and hydralazine     #Prediabetes  - FS  - ISS    DVT ppx: heparin drip  Diet: DASH   Activity: ATT       FOR FOLLOW UP:  () Transfuse 2 Units PRBC before the surgery as recommended by dr. Jefferson,  [ ] please discontinue heparin 6 hrs prior surgery.   [ ] Bowel prep today. and antibiotics order already in  [ ] Npo midnight today. 9/11  () Follow up CBC, BMP, mg, phos, coags, active type and screen of 8pm.

## 2024-09-11 NOTE — CHART NOTE - NSCHARTNOTEFT_GEN_A_CORE
Transfer Note    Transfer from:     Transfer to: (  ) Medicine    (  ) Telemetry     (  ) RCU       (  ) CEU    (  ) VENT                        (  ) Palliative    (  ) Stroke Unit    (  ) MICU    (  ) CCU    Signout given to:     ----------------------------------------------------------------------------------------------------------  ED course:     79-year-old female with past medical history of prediabetes presents for right groin pain with radiation down anterior leg and constipation x 2 days.  Patient has arrived  from [facility] 1 week ago and has been staying with family who have noticed decreased appetite and complaints of right groin pain worsening when walking and decreased bowel movements despite utilizing prune juice and increasing fluids. Tylenol occasionally improves pain.   Patient was seen by her primary Dr Jefferson  last night and directed to the emergency department.  Patient's daughter Queta notes patient underwent a recent surgery to her right colon and [facility], unsure what was done or what was removed but is concerned that her constipation and new pain could be related to the surgery   Denies any  fevers chills nausea vomiting diarrhea no numbness/weakness  :As per primary Dr Jefferson, reviewed records from [facility], pt was found to have metastatic cancer, possibly colon as primary. Was informed needed to start chemo, however there was no chemo available there at the time.  In Ed on vitals   · BP Systolic	 174 mm Hg  · BP Diastolic	83 mm Hg  · Heart Rate	68 /min  afebrile on RA   On labs Hb 10.2 , neutrophil % 90.3 , Na 130 , potassium 5.2 hemolysed   On imaging  On  CT A/P :   Large multiloculated cystic mass with areas of soft tissue, measuring 24.7 x 12.5 x 22.3 cm, extending from the pelvis into the upper abdomen. Findings are concerning for malignancy; felt to likely be gynecologic in etiology, but this mass remainsindeterminate. Indeterminate multiple fluid-filled/cystic lesions within the abdomen measuring up to 6.5 cm, felt to likely be related to cystic metastatic disease. Soft tissue density within the transverse colon adjacent to the splenic flexure on image 52 series 2. Findings may be related to focal stool. Underlying neoplasm cannot be excluded. Subcutaneous nodule measuring 1.8 cm and subcentimeter sclerotic focus in T12, metastatic disease is not excluded. Ascending aorta measures 4.5 cm, aneurysmalCalcified splenic artery aneurysm measuring 1.6 cm.  As per Ed the prelim Duplex BLE report showed  DVTs from gastroc/popliteal/peroneal. Not including femoral  admitted for further workup (04 Sep 2024 20:27)    Course in 4A:     --------------------------------------------------------------------------------------------------------  PMH/PSH:  PAST MEDICAL & SURGICAL HISTORY:      -------------------------------------------------------------------------------------------------------  PHYSICAL EXAM:  General: NAD  HEENT: Atraumatic  Respiratory: CTAB  Cardiac: RRR  Abdomen: soft, non-tender, non-distended  Extremities: warm and well-perfused  Neuro: A+Ox4. No FND    Vital Signs Last 24 Hrs  T(C): 36.5 (11 Sep 2024 05:08), Max: 36.7 (10 Sep 2024 12:19)  T(F): 97.7 (11 Sep 2024 05:08), Max: 98.1 (10 Sep 2024 12:19)  HR: 62 (11 Sep 2024 05:08) (55 - 62)  BP: 174/79 (11 Sep 2024 06:55) (129/64 - 196/82)  BP(mean): --  RR: 17 (11 Sep 2024 05:08) (16 - 18)  SpO2: --        I&O's Summary      --------------------------------------------------------------------------------------------------------  LABS:                               8.6    3.44  )-----------( 233      ( 11 Sep 2024 06:17 )             26.0       09-11    134<L>  |  99  |  14  ----------------------------<  92  4.4   |  24  |  1.1    Ca    8.3<L>      11 Sep 2024 06:17  Mg     1.9     09-11        PT/INR - ( 11 Sep 2024 06:17 )   PT: 11.60 sec;   INR: 1.02 ratio         PTT - ( 11 Sep 2024 06:17 )  PTT:56.8 sec            Specimen Source:   Method Type:   Gram Stain:   Culture Results:   Bacteria:       -------------------------------------------------------------------------------------------------  RADIOLOGY:      ---------------------------------------------------------------------------------------------------  ASSESSMENT & PLAN:   # bilateral DVTs  - VA Duplex LE bilateral: Right lower extremity DVT in peroneal vein and gastrocnemius veins. Left lower extremity DVT in gastrocnemius, posterior tibial, and peroneal   veins.  - Patient is high risk given likely malignancy.  - c/w heparin full AC  - daily cbc  - transfuse for Hgb < 7, keep active T&S    #METASTATIC CANCER, possibly colon as primary   #Large multiloculated cystic mass with areas of soft tissue:  #Constipation   #Decreased Appetite  - ED vitals notable for  ,afebrile, on RA; ED labs notable for neutrophil 90.3%, potassium 5.2 hemolyzed   CTAP : Large multiloculated cystic mass with areas of soft tissue, measuring 24.7 x 12.5 x 22.3 cm, extending from the pelvis into the upper abdomen. Questionable filling defects in both the right and left common femoral vein and deep venous thrombosis is not excluded. Indeterminate multiple fluid-filled/cystic lesions within the abdomen measuring up to 6.5 cm, felt to likely be related to cystic metastatic disease. Soft tissue density within the transverse colon adjacent to the splenic flexure on image 52 series 2. Findings may be related to focal stool. Subcutaneous nodule measuring 1.8 cm and subcentimeter sclerotic focus in T12, metastatic disease is not excluded  - Dr Jefferson, reviewed records from [facility], pt was found to have metastatic cancer, possibly colon as primary. Was informed needed to start chemo, however there was no chemo available there at the time.  - pain control  - EKG for QTc, f/u   - Zofran PRN for vomiting   - Calorie count done- Magic Cup 2x/day (290 kcal, 9 gm protein/serving) to optimize PO intake. patient is at baseline PO intake as per family  - continue with Bowel regimen and monitor Bowel movement  - Ca 125 N, Ca 19-9 N, CEA elevated 4.8, Estradiol 15  - Iron 36, Transferrin 20, TIBC low 178, LDH normal, Haptoglobin 224, retics 1.6, RBC 3.28  - IR following, As per IR ICV filter placement mariya 9/10, no need to hold AC.  - Gyne following - planning to schedule pelvic mass resection on friday, NPO/IVF at midnight. active T&S drawn on 9/11. On 9/11 in the afternoon, patient should start drinking Golytely 4L at noon  - GI following- as per GI, outpatient colonoscopy  - bowel regimen  - serial abdominal physical exams   - avoid opiates  - monitor BMs  - encourage ambulation - PT following  - medically cleared     # Hyponatremia  - most likely due to decreased PO intake   - Na 132 > 128 (9/9) on NS @ 75ml/hr  - calorie count, encourage po intake    #Mild Hydronephrosis  - seen on CTA/P    - creat 1.0, unknown baseline   - bladder scan q6 for rentention   - can consider urology c/s if condition worsen     #Small Pleural effusion - resolved  - seen on CT A/P  - on RA   - CXR this AM no cardiopulmonary disease  - monitor fluid balance and pulse ox    #AORTIC ANEURYSM  4.5 cm   - systolic blood pressure was 170 in ED  - monitor Blood pressure, has been wnl   - unable to confirm med rec  - will start with toprol xl 50mg daily  - atorvastatin 20mg daily    #Prediabetes  - FS  - ISS    DVT ppx: heparin drip  Diet: DASH   Activity: ATT     Progress note Handoff- IVC filter placement mariya, Follow up IR, Gyne  F/u:  vitamin b12, folate, Retics, inhibin a/b, estradiol, TVUS      FOR FOLLOW UP:  [ ]   [ ]   [ ] Transfer Note    Transfer from:     Transfer to: [facility]    ED course:     79-year-old female with past medical history of prediabetes presents for right groin pain with radiation down anterior leg and constipation x 2 days.  Patient has arrived  from [facility] 1 week ago and has been staying with family who have noticed decreased appetite and complaints of right groin pain worsening when walking and decreased bowel movements despite utilizing prune juice and increasing fluids. Tylenol occasionally improves pain.   Patient was seen by her primary Dr Jefferson  last night and directed to the emergency department.  Patient's daughter Queta notes patient underwent a recent surgery to her right colon and [facility], unsure what was done or what was removed but is concerned that her constipation and new pain could be related to the surgery   Denies any  fevers chills nausea vomiting diarrhea no numbness/weakness  :As per primary Dr Jefferson, reviewed records from [facility], pt was found to have metastatic cancer, possibly colon as primary. Was informed needed to start chemo, however there was no chemo available there at the time.  In Ed on vitals   · BP Systolic	 174 mm Hg  · BP Diastolic	83 mm Hg  · Heart Rate	68 /min  afebrile on RA   On labs Hb 10.2 , neutrophil % 90.3 , Na 130 , potassium 5.2 hemolysed   On imaging  On  CT A/P :   Large multiloculated cystic mass with areas of soft tissue, measuring 24.7 x 12.5 x 22.3 cm, extending from the pelvis into the upper abdomen. Findings are concerning for malignancy; felt to likely be gynecologic in etiology, but this mass remainsindeterminate. Indeterminate multiple fluid-filled/cystic lesions within the abdomen measuring up to 6.5 cm, felt to likely be related to cystic metastatic disease. Soft tissue density within the transverse colon adjacent to the splenic flexure on image 52 series 2. Findings may be related to focal stool. Underlying neoplasm cannot be excluded. Subcutaneous nodule measuring 1.8 cm and subcentimeter sclerotic focus in T12, metastatic disease is not excluded. Ascending aorta measures 4.5 cm, aneurysmalCalcified splenic artery aneurysm measuring 1.6 cm.  As per Ed the prelim Duplex BLE report showed  DVTs from gastroc/popliteal/peroneal. Not including femoral  admitted for further workup (04 Sep 2024 20:27)    Course in 4A:   Patient was managed for METASTATIC CANCER, possibly colon as primary and Large multiloculated cystic mass with areas of soft tissue extending from pelvis to abdomen. Calorie count done, advised for Magic Cup 2x/day (290 kcal, 9 gm protein/serving) to optimize PO intake. patient is at baseline PO intake as per family. continued with Bowel regimen and monitoring for bowel movement being was done. Ca 125 N, Ca 19-9 N, CEA elevated 4.8, Estradiol 15.  Iron 36, Transferrin 20, TIBC low 178, LDH normal, Haptoglobin 224, retics 1.6, RBC 3.28.  IR following, As per IR ICV filter placement today 9/10, no need to hold AC. patient is on clear liquid diet today 9/11. Gyne following, scheduled pelvic mass resection on thursday, NPO/IVF at midnight. active T&S drawn on 9/11.  On 9/11 in the afternoon, patient should start drinking Golytely 4L at noon. GI following- as per GI, outpatient colonoscopy. Avoid opiates. encourage ambulation - PT following. Pateint is medically cleared and fit for surgery.      --------------------------------------------------------------------------------------------------------  PMH/PSH:  PAST MEDICAL & SURGICAL HISTORY:      -------------------------------------------------------------------------------------------------------  PHYSICAL EXAM:  General: NAD  HEENT: Atraumatic  Respiratory: CTAB  Cardiac: RRR  Abdomen: soft, non-tender, non-distended  Extremities: warm and well-perfused  Neuro: A+Ox4. No FND    Vital Signs Last 24 Hrs  T(C): 36.5 (11 Sep 2024 05:08), Max: 36.7 (10 Sep 2024 12:19)  T(F): 97.7 (11 Sep 2024 05:08), Max: 98.1 (10 Sep 2024 12:19)  HR: 62 (11 Sep 2024 05:08) (55 - 62)  BP: 174/79 (11 Sep 2024 06:55) (129/64 - 196/82)  BP(mean): --  RR: 17 (11 Sep 2024 05:08) (16 - 18)  SpO2: --        I&O's Summary      --------------------------------------------------------------------------------------------------------  LABS:                               8.6    3.44  )-----------( 233      ( 11 Sep 2024 06:17 )             26.0       09-11    134<L>  |  99  |  14  ----------------------------<  92  4.4   |  24  |  1.1    Ca    8.3<L>      11 Sep 2024 06:17  Mg     1.9     09-11        PT/INR - ( 11 Sep 2024 06:17 )   PT: 11.60 sec;   INR: 1.02 ratio         PTT - ( 11 Sep 2024 06:17 )  PTT:56.8 sec            Specimen Source:   Method Type:   Gram Stain:   Culture Results:   Bacteria:       ASSESSMENT & PLAN:   SKYLAR ORTIZ is a 79y year old with PMHx of prediabetes and is s/p ex lap surgery in [facility], likely due to metastatic cancer, possibly primarily from colon who presented to the ED with right groin pain. Patient came back from [facility] last week and presented with constipation, groin pain radiating towards the leg, found to have a large multiloculated cystic measuring 24.7 x 12.5 x 22.3 cm, extending from the pelvis into the upper abdomen. Patient believes that about 2-3 months ago, patient had a surgery in [facility] where they opened her abdomen and noted the large mass and then closed it back up without any intervention.    # bilateral DVTs  - VA Duplex LE bilateral: Right lower extremity DVT in peroneal vein and gastrocnemius veins. Left lower extremity DVT in gastrocnemius, posterior tibial, and peroneal   veins.  - Patient is high risk given likely malignancy.  - c/w heparin full AC  - daily cbc  - transfuse for Hgb < 7, keep active T&S    #METASTATIC CANCER, possibly colon as primary   #Large multiloculated cystic mass with areas of soft tissue:  #Constipation   #Decreased Appetite  - ED vitals notable for  ,afebrile, on RA; ED labs notable for neutrophil 90.3%, potassium 5.2 hemolyzed   CTAP : Large multiloculated cystic mass with areas of soft tissue, measuring 24.7 x 12.5 x 22.3 cm, extending from the pelvis into the upper abdomen. Questionable filling defects in both the right and left common femoral vein and deep venous thrombosis is not excluded. Indeterminate multiple fluid-filled/cystic lesions within the abdomen measuring up to 6.5 cm, felt to likely be related to cystic metastatic disease. Soft tissue density within the transverse colon adjacent to the splenic flexure on image 52 series 2. Findings may be related to focal stool. Subcutaneous nodule measuring 1.8 cm and subcentimeter sclerotic focus in T12, metastatic disease is not excluded  - Dr Jefferson, reviewed records from [facility], pt was found to have metastatic cancer, possibly colon as primary. Was informed needed to start chemo, however there was no chemo available there at the time.  - pain control  - EKG for QTc, f/u   - Zofran PRN for vomiting   - Calorie count done- Magic Cup 2x/day (290 kcal, 9 gm protein/serving) to optimize PO intake. patient is at baseline PO intake as per family  - continue with Bowel regimen and monitor Bowel movement  - Ca 125 N, Ca 19-9 N, CEA elevated 4.8, Estradiol 15  - Iron 36, Transferrin 20, TIBC low 178, LDH normal, Haptoglobin 224, retics 1.6, RBC 3.28  - IR following, As per IR ICV filter placement mariya 9/10, no need to hold AC.  - Gyne following - planning to schedule pelvic mass resection on friday, NPO/IVF at midnight. active T&S drawn on 9/11. On 9/11 in the afternoon, patient should start drinking Golytely 4L at noon  - GI following- as per GI, outpatient colonoscopy  - bowel regimen  - serial abdominal physical exams   - avoid opiates  - monitor BMs  - encourage ambulation - PT following  - medically cleared     # Hyponatremia  - most likely due to decreased PO intake   - Na 132 > 128 (9/9) on NS @ 75ml/hr  - calorie count, encourage po intake    #Mild Hydronephrosis  - seen on CTA/P    - creat 1.0, unknown baseline   - bladder scan q6 for rentention   - can consider urology c/s if condition worsen     #Small Pleural effusion - resolved  - seen on CT A/P  - on RA   - CXR this AM no cardiopulmonary disease  - monitor fluid balance and pulse ox    #AORTIC ANEURYSM  4.5 cm   - systolic blood pressure was 170 in ED  - monitor Blood pressure, has been wnl   - unable to confirm med rec  - will start with toprol xl 50mg daily  - atorvastatin 20mg daily    #Prediabetes  - FS  - ISS    DVT ppx: heparin drip  Diet: DASH   Activity: ATT     Progress note Handoff- IVC filter placement mariya, Follow up IR, Gyne  F/u:  vitamin b12, folate, Retics, inhibin a/b, estradiol, TVUS      FOR FOLLOW UP:  [ ] please discontinue heparin 6 hrs prior surgery.   [ ] Bowel prep today. and antibiotics order already in  [ ] Npo midnight today.  () Follow up CBC, BMP, mg, phos, coags, active type and screen Transfer Note    Transfer from:     Transfer to: [facility]    ED course:     79-year-old female with past medical history of prediabetes presents for right groin pain with radiation down anterior leg and constipation x 2 days.  Patient has arrived  from [facility] 1 week ago and has been staying with family who have noticed decreased appetite and complaints of right groin pain worsening when walking and decreased bowel movements despite utilizing prune juice and increasing fluids. Tylenol occasionally improves pain.   Patient was seen by her primary Dr Jefferson  last night and directed to the emergency department.  Patient's daughter Queta notes patient underwent a recent surgery to her right colon and [facility], unsure what was done or what was removed but is concerned that her constipation and new pain could be related to the surgery   Denies any  fevers chills nausea vomiting diarrhea no numbness/weakness  :As per primary Dr Jefferson, reviewed records from [facility], pt was found to have metastatic cancer, possibly colon as primary. Was informed needed to start chemo, however there was no chemo available there at the time.  In Ed on vitals   · BP Systolic	 174 mm Hg  · BP Diastolic	83 mm Hg  · Heart Rate	68 /min  afebrile on RA   On labs Hb 10.2 , neutrophil % 90.3 , Na 130 , potassium 5.2 hemolysed   On imaging  On  CT A/P :   Large multiloculated cystic mass with areas of soft tissue, measuring 24.7 x 12.5 x 22.3 cm, extending from the pelvis into the upper abdomen. Findings are concerning for malignancy; felt to likely be gynecologic in etiology, but this mass remainsindeterminate. Indeterminate multiple fluid-filled/cystic lesions within the abdomen measuring up to 6.5 cm, felt to likely be related to cystic metastatic disease. Soft tissue density within the transverse colon adjacent to the splenic flexure on image 52 series 2. Findings may be related to focal stool. Underlying neoplasm cannot be excluded. Subcutaneous nodule measuring 1.8 cm and subcentimeter sclerotic focus in T12, metastatic disease is not excluded. Ascending aorta measures 4.5 cm, aneurysmalCalcified splenic artery aneurysm measuring 1.6 cm.  As per Ed the prelim Duplex BLE report showed  DVTs from gastroc/popliteal/peroneal. Not including femoral  admitted for further workup (04 Sep 2024 20:27)    Course in 4A:   Patient was managed for METASTATIC CANCER, possibly colon as primary and Large multiloculated cystic mass with areas of soft tissue extending from pelvis to abdomen. Calorie count done, advised for Magic Cup 2x/day (290 kcal, 9 gm protein/serving) to optimize PO intake. patient is at baseline PO intake as per family. continued with Bowel regimen and monitoring for bowel movement being was done. Ca 125 N, Ca 19-9 N, CEA elevated 4.8, Estradiol 15.  Iron 36, Transferrin 20, TIBC low 178, LDH normal, Haptoglobin 224, retics 1.6, RBC 3.28.  IR following, As per IR ICV filter placement today 9/10, no need to hold AC. patient is on clear liquid diet today 9/11. Gyne following, scheduled pelvic mass resection on thursday, NPO/IVF at midnight. active T&S drawn on 9/11.  On 9/11 in the afternoon, patient should start drinking Golytely 4L at noon. GI following- as per GI, outpatient colonoscopy. Avoid opiates. encourage ambulation - PT following. Pateint is medically cleared and fit for surgery.      --------------------------------------------------------------------------------------------------------  PMH/PSH:  PAST MEDICAL & SURGICAL HISTORY:      -------------------------------------------------------------------------------------------------------  PHYSICAL EXAM:  General: NAD  HEENT: Atraumatic  Respiratory: CTAB  Cardiac: RRR  Abdomen: soft, non-tender, non-distended  Extremities: warm and well-perfused  Neuro: A+Ox4. No FND    Vital Signs Last 24 Hrs  T(C): 36.5 (11 Sep 2024 05:08), Max: 36.7 (10 Sep 2024 12:19)  T(F): 97.7 (11 Sep 2024 05:08), Max: 98.1 (10 Sep 2024 12:19)  HR: 62 (11 Sep 2024 05:08) (55 - 62)  BP: 174/79 (11 Sep 2024 06:55) (129/64 - 196/82)  BP(mean): --  RR: 17 (11 Sep 2024 05:08) (16 - 18)  SpO2: --        I&O's Summary      --------------------------------------------------------------------------------------------------------  LABS:                               8.6    3.44  )-----------( 233      ( 11 Sep 2024 06:17 )             26.0       09-11    134<L>  |  99  |  14  ----------------------------<  92  4.4   |  24  |  1.1    Ca    8.3<L>      11 Sep 2024 06:17  Mg     1.9     09-11        PT/INR - ( 11 Sep 2024 06:17 )   PT: 11.60 sec;   INR: 1.02 ratio         PTT - ( 11 Sep 2024 06:17 )  PTT:56.8 sec            Specimen Source:   Method Type:   Gram Stain:   Culture Results:   Bacteria:       ASSESSMENT & PLAN:   SKYLAR ORTIZ is a 79y year old with PMHx of prediabetes and is s/p ex lap surgery in [facility], likely due to metastatic cancer, possibly primarily from colon who presented to the ED with right groin pain. Patient came back from [facility] last week and presented with constipation, groin pain radiating towards the leg, found to have a large multiloculated cystic measuring 24.7 x 12.5 x 22.3 cm, extending from the pelvis into the upper abdomen. Patient believes that about 2-3 months ago, patient had a surgery in [facility] where they opened her abdomen and noted the large mass and then closed it back up without any intervention.    # bilateral DVTs  - VA Duplex LE bilateral: Right lower extremity DVT in peroneal vein and gastrocnemius veins. Left lower extremity DVT in gastrocnemius, posterior tibial, and peroneal   veins.  - Patient is high risk given likely malignancy.  - c/w heparin full AC  - daily cbc  - transfuse for Hgb < 7, keep active T&S    #METASTATIC CANCER, possibly colon as primary   #Large multiloculated cystic mass with areas of soft tissue:  #Constipation   #Decreased Appetite  - ED vitals notable for  ,afebrile, on RA; ED labs notable for neutrophil 90.3%, potassium 5.2 hemolyzed   CTAP : Large multiloculated cystic mass with areas of soft tissue, measuring 24.7 x 12.5 x 22.3 cm, extending from the pelvis into the upper abdomen. Questionable filling defects in both the right and left common femoral vein and deep venous thrombosis is not excluded. Indeterminate multiple fluid-filled/cystic lesions within the abdomen measuring up to 6.5 cm, felt to likely be related to cystic metastatic disease. Soft tissue density within the transverse colon adjacent to the splenic flexure on image 52 series 2. Findings may be related to focal stool. Subcutaneous nodule measuring 1.8 cm and subcentimeter sclerotic focus in T12, metastatic disease is not excluded  - Dr Jefferson, reviewed records from [facility], pt was found to have metastatic cancer, possibly colon as primary. Was informed needed to start chemo, however there was no chemo available there at the time.  - pain control  - EKG for QTc, f/u   - Zofran PRN for vomiting   - Calorie count done- Magic Cup 2x/day (290 kcal, 9 gm protein/serving) to optimize PO intake. patient is at baseline PO intake as per family  - continue with Bowel regimen and monitor Bowel movement  - Ca 125 N, Ca 19-9 N, CEA elevated 4.8, Estradiol 15  - Iron 36, Transferrin 20, TIBC low 178, LDH normal, Haptoglobin 224, retics 1.6, RBC 3.28  - IR following, As per IR ICV filter placement mariya 9/10, no need to hold AC.  - Gyne following - planning to schedule pelvic mass resection on friday, NPO/IVF at midnight. active T&S drawn on 9/11. On 9/11 in the afternoon, patient should start drinking Golytely 4L at noon  - GI following- as per GI, outpatient colonoscopy  - bowel regimen  - serial abdominal physical exams   - avoid opiates  - monitor BMs  - encourage ambulation - PT following  - medically cleared     # Hyponatremia  - most likely due to decreased PO intake   - Na 132 > 128 (9/9) on NS @ 75ml/hr  - calorie count, encourage po intake    #Mild Hydronephrosis  - seen on CTA/P    - creat 1.0, unknown baseline   - bladder scan q6 for rentention   - can consider urology c/s if condition worsen     #Small Pleural effusion - resolved  - seen on CT A/P  - on RA   - CXR this AM no cardiopulmonary disease  - monitor fluid balance and pulse ox    #AORTIC ANEURYSM  4.5 cm   - systolic blood pressure was 170 in ED  - monitor Blood pressure, has been wnl   - unable to confirm med rec  - will start with toprol xl 50mg daily  - atorvastatin 20mg daily    #Prediabetes  - FS  - ISS    DVT ppx: heparin drip  Diet: DASH   Activity: ATT     Progress note Handoff- IVC filter placement mariya, Follow up IR, Gyne  F/u:  vitamin b12, folate, Retics, inhibin a/b, estradiol, TVUS      FOR FOLLOW UP:  () Transfuse 2 Units PRBC before the surgery as recommended by dr. Jefferson,  [ ] please discontinue heparin 6 hrs prior surgery.   [ ] Bowel prep today. and antibiotics order already in  [ ] Npo midnight today.  () Follow up CBC, BMP, mg, phos, coags, active type and screen Transfer Note    Transfer from:     Transfer to: [facility]    ED course:     79-year-old female with past medical history of prediabetes presents for right groin pain with radiation down anterior leg and constipation x 2 days.  Patient has arrived  from [facility] 1 week ago and has been staying with family who have noticed decreased appetite and complaints of right groin pain worsening when walking and decreased bowel movements despite utilizing prune juice and increasing fluids. Tylenol occasionally improves pain.   Patient was seen by her primary Dr Jefferson  last night and directed to the emergency department.  Patient's daughter Queta notes patient underwent a recent surgery to her right colon and [facility], unsure what was done or what was removed but is concerned that her constipation and new pain could be related to the surgery   Denies any  fevers chills nausea vomiting diarrhea no numbness/weakness  :As per primary Dr Jefferson, reviewed records from [facility], pt was found to have metastatic cancer, possibly colon as primary. Was informed needed to start chemo, however there was no chemo available there at the time.  In Ed on vitals   · BP Systolic	 174 mm Hg  · BP Diastolic	83 mm Hg  · Heart Rate	68 /min  afebrile on RA   On labs Hb 10.2 , neutrophil % 90.3 , Na 130 , potassium 5.2 hemolysed   On imaging  On  CT A/P :   Large multiloculated cystic mass with areas of soft tissue, measuring 24.7 x 12.5 x 22.3 cm, extending from the pelvis into the upper abdomen. Findings are concerning for malignancy; felt to likely be gynecologic in etiology, but this mass remainsindeterminate. Indeterminate multiple fluid-filled/cystic lesions within the abdomen measuring up to 6.5 cm, felt to likely be related to cystic metastatic disease. Soft tissue density within the transverse colon adjacent to the splenic flexure on image 52 series 2. Findings may be related to focal stool. Underlying neoplasm cannot be excluded. Subcutaneous nodule measuring 1.8 cm and subcentimeter sclerotic focus in T12, metastatic disease is not excluded. Ascending aorta measures 4.5 cm, aneurysmalCalcified splenic artery aneurysm measuring 1.6 cm.  As per Ed the prelim Duplex BLE report showed  DVTs from gastroc/popliteal/peroneal. Not including femoral  admitted for further workup (04 Sep 2024 20:27)    Course in 4A:   Patient was managed for METASTATIC CANCER, possibly colon as primary and Large multiloculated cystic mass with areas of soft tissue extending from pelvis to abdomen. Calorie count done, advised for Magic Cup 2x/day (290 kcal, 9 gm protein/serving) to optimize PO intake. patient is at baseline PO intake as per family. continued with Bowel regimen and monitoring for bowel movement being was done. Ca 125 N, Ca 19-9 N, CEA elevated 4.8, Estradiol 15.  Iron 36, Transferrin 20, TIBC low 178, LDH normal, Haptoglobin 224, retics 1.6, RBC 3.28.  IR following, As per IR ICV filter placement today 9/10, no need to hold AC. patient is on clear liquid diet today 9/11. Gyne following, scheduled pelvic mass resection on thursday, NPO/IVF at midnight. active T&S drawn on 9/11.  On 9/11 in the afternoon, patient should start drinking Golytely 4L at noon. GI following- as per GI, outpatient colonoscopy. Avoid opiates. encourage ambulation - PT following. Pateint is medically cleared and fit for surgery.      --------------------------------------------------------------------------------------------------------  PMH/PSH:  PAST MEDICAL & SURGICAL HISTORY:      -------------------------------------------------------------------------------------------------------  PHYSICAL EXAM:  General: NAD  HEENT: Atraumatic  Respiratory: CTAB  Cardiac: RRR  Abdomen: soft, non-tender, non-distended  Extremities: warm and well-perfused  Neuro: A+Ox4. No FND    Vital Signs Last 24 Hrs  T(C): 36.5 (11 Sep 2024 05:08), Max: 36.7 (10 Sep 2024 12:19)  T(F): 97.7 (11 Sep 2024 05:08), Max: 98.1 (10 Sep 2024 12:19)  HR: 62 (11 Sep 2024 05:08) (55 - 62)  BP: 174/79 (11 Sep 2024 06:55) (129/64 - 196/82)  BP(mean): --  RR: 17 (11 Sep 2024 05:08) (16 - 18)  SpO2: --        I&O's Summary      --------------------------------------------------------------------------------------------------------  LABS:                               8.6    3.44  )-----------( 233      ( 11 Sep 2024 06:17 )             26.0       09-11    134<L>  |  99  |  14  ----------------------------<  92  4.4   |  24  |  1.1    Ca    8.3<L>      11 Sep 2024 06:17  Mg     1.9     09-11        PT/INR - ( 11 Sep 2024 06:17 )   PT: 11.60 sec;   INR: 1.02 ratio         PTT - ( 11 Sep 2024 06:17 )  PTT:56.8 sec            Specimen Source:   Method Type:   Gram Stain:   Culture Results:   Bacteria:       ASSESSMENT & PLAN:   SKYLAR ORTIZ is a 79y year old with PMHx of prediabetes and is s/p ex lap surgery in [facility], likely due to metastatic cancer, possibly primarily from colon who presented to the ED with right groin pain. Patient came back from [facility] last week and presented with constipation, groin pain radiating towards the leg, found to have a large multiloculated cystic measuring 24.7 x 12.5 x 22.3 cm, extending from the pelvis into the upper abdomen. Patient believes that about 2-3 months ago, patient had a surgery in [facility] where they opened her abdomen and noted the large mass and then closed it back up without any intervention.    # bilateral DVTs  - [facility] Duplex LE bilateral: Right lower extremity DVT in peroneal vein and gastrocnemius veins. Left lower extremity DVT in gastrocnemius, posterior tibial, and peroneal   veins.  - Patient is high risk given likely malignancy.  - c/w heparin full AC  - daily cbc  - transfuse for Hgb < 7, keep active T&S    #METASTATIC CANCER, possibly colon as primary   #Large multiloculated cystic mass with areas of soft tissue:  #Constipation   #Decreased Appetite  - ED vitals notable for  ,afebrile, on RA; ED labs notable for neutrophil 90.3%, potassium 5.2 hemolyzed   CTAP : Large multiloculated cystic mass with areas of soft tissue, measuring 24.7 x 12.5 x 22.3 cm, extending from the pelvis into the upper abdomen. Questionable filling defects in both the right and left common femoral vein and deep venous thrombosis is not excluded. Indeterminate multiple fluid-filled/cystic lesions within the abdomen measuring up to 6.5 cm, felt to likely be related to cystic metastatic disease. Soft tissue density within the transverse colon adjacent to the splenic flexure on image 52 series 2. Findings may be related to focal stool. Subcutaneous nodule measuring 1.8 cm and subcentimeter sclerotic focus in T12, metastatic disease is not excluded  - Dr Jefferson, reviewed records from [facility], pt was found to have metastatic cancer, possibly colon as primary. Was informed needed to start chemo, however there was no chemo available there at the time.  - pain control  - EKG for QTc, f/u   - Zofran PRN for vomiting   - Calorie count done- Magic Cup 2x/day (290 kcal, 9 gm protein/serving) to optimize PO intake. patient is at baseline PO intake as per family  - continue with Bowel regimen and monitor Bowel movement  - Ca 125 N, Ca 19-9 N, CEA elevated 4.8, Estradiol 15  - Iron 36, Transferrin 20, TIBC low 178, LDH normal, Haptoglobin 224, retics 1.6, RBC 3.28  - IR following, As per IR ICV filter placement mariya 9/10, no need to hold AC.  - Gyne following - planning to schedule pelvic mass resection on friday, NPO/IVF at midnight. active T&S drawn on 9/11. On 9/11 in the afternoon, patient should start drinking Golytely 4L at noon  - GI following- as per GI, outpatient colonoscopy  - bowel regimen  - serial abdominal physical exams   - avoid opiates  - monitor BMs  - encourage ambulation - PT following  - medically cleared     # Hyponatremia  - most likely due to decreased PO intake   - Na 132 > 128 (9/9) on NS @ 75ml/hr  - calorie count, encourage po intake    #Mild Hydronephrosis  - seen on CTA/P    - creat 1.0, unknown baseline   - bladder scan q6 for rentention   - can consider urology c/s if condition worsen     #Small Pleural effusion - resolved  - seen on CT A/P  - on RA   - CXR this AM no cardiopulmonary disease  - monitor fluid balance and pulse ox    #AORTIC ANEURYSM  4.5 cm   - systolic blood pressure was 170 in ED  - monitor Blood pressure, has been wnl   - unable to confirm med rec  - will start with toprol xl 50mg daily  - atorvastatin 20mg daily    #Prediabetes  - FS  - ISS    DVT ppx: heparin drip  Diet: DASH   Activity: ATT     Progress note Handoff- IVC filter placement mariya, Follow up IR, Gyne  F/u:  vitamin b12, folate, Retics, inhibin a/b, estradiol, TVUS      FOR FOLLOW UP:  () Transfuse 2 Units PRBC before the surgery as recommended by dr. Jefferson,  [ ] please discontinue heparin 6 hrs prior surgery.   [ ] Bowel prep today. and antibiotics order already in  [ ] Npo midnight today. 9/11  () Follow up CBC, BMP, mg, phos, coags, active type and screen of 8pm

## 2024-09-11 NOTE — PROGRESS NOTE ADULT - SUBJECTIVE AND OBJECTIVE BOX
GENERAL SURGERY PROGRESS NOTE     SKYLAR ORTIZ  [address] [place] [address] day :8d    POD:  Procedure:   Surgical Attending: Morgan Shi  Overnight events: No acute events overnight. Pt is scheduled for OR on 9/12 for EX Lap with Dr. Weiss and GYN team. Surgical oncology team available for assistance in OR.      T(F): 97.7 (09-11-24 @ 05:08), Max: 98.1 (09-10-24 @ 12:19)  HR: 62 (09-11-24 @ 05:08) (55 - 62)  BP: 174/79 (09-11-24 @ 06:55) (129/64 - 196/82)  ABP: --  ABP(mean): --  RR: 17 (09-11-24 @ 05:08) (16 - 18)  SpO2: --    IN'S / OUT's:      PHYSICAL EXAM:  GENERAL: NAD  CHEST/LUNG: equal chest rise b/l  HEART: Regular rate and rhythm  ABDOMEN: Soft, Nontender, Nondistended;   EXTREMITIES:  No clubbing, cyanosis, or edema      LABS  Labs:  CAPILLARY BLOOD GLUCOSE      POCT Blood Glucose.: 100 mg/dL (11 Sep 2024 07:15)  POCT Blood Glucose.: 154 mg/dL (10 Sep 2024 21:08)  POCT Blood Glucose.: 112 mg/dL (10 Sep 2024 16:27)  POCT Blood Glucose.: 137 mg/dL (10 Sep 2024 11:19)                          8.6    3.44  )-----------( 233      ( 11 Sep 2024 06:17 )             26.0         09-11    134<L>  |  99  |  14  ----------------------------<  92  4.4   |  24  |  1.1      Calcium: 8.3 mg/dL (09-11-24 @ 06:17)      LFTs:         Coags:     11.60  ----< 1.02    ( 11 Sep 2024 06:17 )     56.8                Urinalysis Basic - ( 11 Sep 2024 06:17 )    Color: x / Appearance: x / SG: x / pH: x  Gluc: 92 mg/dL / Ketone: x  / Bili: x / Urobili: x   Blood: x / Protein: x / Nitrite: x   Leuk Esterase: x / RBC: x / WBC x   Sq Epi: x / Non Sq Epi: x / Bacteria: x            RADIOLOGY & ADDITIONAL TESTS:      A/P:  SKYLAR ORTIZ is a 79y year old with PMHx of prediabetes and is s/p ex lap surgery in [facility], likely due to metastatic cancer, possibly primarily from colon who presented to the ED with right groin pain. Patient came back from [facility] last week and presented with constipation, groin pain radiating towards the leg, found to have a large multiloculated cystic measuring 24.7 x 12.5 x 22.3 cm, extending from the pelvis into the upper abdomen. Patient believes that about 2-3 months ago, patient had a surgery in [facility] where they opened her abdomen and noted the large mass and then closed it back up without any intervention    PLAN:   - plan for OR on 9/12 for EX-Lap with Dr. Weiss and GYN, Surgical oncology available for assistance in OR.   - will need surgical bowel prep of 1L Go-Lytely on 9/11 as well as 1G Flagyl and 1G neomycin at 2PM, 3PM, and 11PM. Recommend clear liquid diet.   - NPO at midnight 9/11  - obtain pre-op labs 9/11 including CBC, BMP, Mg, Phos, Coags, active type and screen  - multi modal pain control  - DVT ppx  - rest of care per Primary team    #Antibiotics: metroNIDAZOLE    Tablet 1000 milliGRAM(s) Oral <User Schedule>, 09-10-24 @ 22:00  neomycin 1000 milliGRAM(s) Oral <User Schedule>, 09-10-24 @ 18:35   Day **  #DVT ppx: heparin   Injectable 5000 Unit(s) IV Push every 6 hours PRN  heparin   Injectable 2500 Unit(s) IV Push every 6 hours PRN  heparin  Infusion. 900 Unit(s)/Hr IV Continuous <Continuous>    Disposition:  4A    Above plan to be discussed with Attending Surgeon Dr. Shi  , patient, patient family, and rest of health care team    [facility] [id number]     GENERAL SURGERY PROGRESS NOTE     SKYLAR ORTIZ  [address] [place] [address] day :10d    POD:  Procedure:   Surgical Attending: Junior Morris  Overnight events: No acute events overnight. Pt is scheduled for OR on 9/12 for EX Lap with Dr. Weiss and GYN team. Surgical oncology team available for assistance in OR.      T(F): 97.7 (09-11-24 @ 05:08), Max: 98.1 (09-10-24 @ 12:19)  HR: 62 (09-11-24 @ 05:08) (55 - 62)  BP: 174/79 (09-11-24 @ 06:55) (129/64 - 196/82)  ABP: --  ABP(mean): --  RR: 17 (09-11-24 @ 05:08) (16 - 18)  SpO2: --    IN'S / OUT's:      PHYSICAL EXAM:  GENERAL: NAD  CHEST/LUNG: equal chest rise b/l  HEART: Regular rate and rhythm  ABDOMEN: Soft, Nontender, Nondistended;   EXTREMITIES:  No clubbing, cyanosis, or edema      LABS  Labs:  CAPILLARY BLOOD GLUCOSE      POCT Blood Glucose.: 100 mg/dL (11 Sep 2024 07:15)  POCT Blood Glucose.: 154 mg/dL (10 Sep 2024 21:08)  POCT Blood Glucose.: 112 mg/dL (10 Sep 2024 16:27)  POCT Blood Glucose.: 137 mg/dL (10 Sep 2024 11:19)                          8.6    3.44  )-----------( 233      ( 11 Sep 2024 06:17 )             26.0         09-11    134<L>  |  99  |  14  ----------------------------<  92  4.4   |  24  |  1.1      Calcium: 8.3 mg/dL (09-11-24 @ 06:17)      LFTs:         Coags:     11.60  ----< 1.02    ( 11 Sep 2024 06:17 )     56.8                Urinalysis Basic - ( 11 Sep 2024 06:17 )    Color: x / Appearance: x / SG: x / pH: x  Gluc: 92 mg/dL / Ketone: x  / Bili: x / Urobili: x   Blood: x / Protein: x / Nitrite: x   Leuk Esterase: x / RBC: x / WBC x   Sq Epi: x / Non Sq Epi: x / Bacteria: x            RADIOLOGY & ADDITIONAL TESTS:      A/P:  SKYLAR ORTIZ is a 79y year old with PMHx of prediabetes and is s/p ex lap surgery in [facility], likely due to metastatic cancer, possibly primarily from colon who presented to the ED with right groin pain. Patient came back from [facility] last week and presented with constipation, groin pain radiating towards the leg, found to have a large multiloculated cystic measuring 24.7 x 12.5 x 22.3 cm, extending from the pelvis into the upper abdomen. Patient believes that about 2-3 months ago, patient had a surgery in [facility] where they opened her abdomen and noted the large mass and then closed it back up without any intervention    PLAN:   - plan for OR on 9/12 for EX-Lap with Dr. Weiss and GYN, Surgical oncology available for assistance in OR.   - will need surgical bowel prep of 1L Go-Lytely on 9/11 as well as 1G Flagyl and 1G neomycin at 2PM, 3PM, and 11PM. Recommend clear liquid diet.   - NPO at midnight 9/11  - obtain pre-op labs 9/11 including CBC, BMP, Mg, Phos, Coags, active type and screen  - multi modal pain control  - DVT ppx  - rest of care per Primary team    #Antibiotics: metroNIDAZOLE    Tablet 1000 milliGRAM(s) Oral <User Schedule>, 09-10-24 @ 22:00  neomycin 1000 milliGRAM(s) Oral <User Schedule>, 09-10-24 @ 18:35   Day **  #DVT ppx: heparin   Injectable 5000 Unit(s) IV Push every 6 hours PRN  heparin   Injectable 2500 Unit(s) IV Push every 6 hours PRN  heparin  Infusion. 900 Unit(s)/Hr IV Continuous <Continuous>    Disposition:  4A    Above plan to be discussed with Attending Surgeon Dr. Morris  , patient, patient family, and rest of health care team    [facility] [id number]

## 2024-09-11 NOTE — PROGRESS NOTE ADULT - SUBJECTIVE AND OBJECTIVE BOX
INTERVENTIONAL RADIOLOGY CONSULT:     HPI:   79-year-old female with past medical history of prediabetes presents for right groin pain with radiation down anterior leg and constipation x 2 days.  Patient has arrived  from [facility] 1 week ago and has been staying with family who have noticed decreased appetite and complaints of right groin pain worsening when walking and decreased bowel movements despite utilizing prune juice and increasing fluids. Tylenol occasionally improves pain.   Patient was seen by her primary Dr Jefferson  last night and directed to the emergency department.  Patient's daughter Queta notes patient underwent a recent surgery to her right colon and [facility], unsure what was done or what was removed but is concerned that her constipation and new pain could be related to the surgery   Denies any  fevers chills nausea vomiting diarrhea no numbness/weakness    off note:As per primary Dr Jefferson, reviewed records from [facility], pt was found to have metastatic cancer, possibly colon as primary. Was informed needed to start chemo, however there was no chemo available there at the time.      In Ed on vitals   · BP Systolic	 174 mm Hg  · BP Diastolic	83 mm Hg  · Heart Rate	68 /min  afebrile on RA     On labs Hb 10.2 , neutrophil % 90.3 , Na 130 , potassium 5.2 hemolysed   On imaging  On  CT A/P :   Large multiloculated cystic mass with areas of soft tissue, measuring   24.7 x 12.5 x 22.3 cm, extending from the pelvis into the upper abdomen.    Findings are concerning for malignancy; felt to likely be gynecologic in   etiology, but this mass remains indeterminate.    Questionable filling defects in both the right and left common femoral   vein and deep venous thrombosis is not excluded.      Mild bilateral hydronephrosis    Indeterminate multiple fluid-filled/cystic lesions within the abdomen   measuring up to 6.5 cm, felt to likely be related to cystic metastatic   disease    Soft tissue density within the transverse colon adjacent to the splenic   flexure on image 52 series 2. Findings may be related to focal stool.   Underlying neoplasm cannot be excluded.    Subcutaneous nodule measuring 1.8 cm and subcentimeter sclerotic focus in   T12, metastatic disease is not excluded    Small right pleural effusion.    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    As per Ed the prelim Duplex BLE report showed  DVTs from gastroc/popliteal/peroneal. Not including femoral        admitted for further workup (04 Sep 2024 20:27)    --------------------------------------------------------------------------------------------  FAMILY HISTORY:    PAST MEDICAL & SURGICAL HISTORY:    ---------------------------------------------------------------------------------------------  Allergies    No Known Allergies    Intolerances      MEDICATIONS  (STANDING):  acetaminophen     Tablet .. 650 milliGRAM(s) Oral every 8 hours  atorvastatin 20 milliGRAM(s) Oral at bedtime  chlorhexidine 2% Cloths 1 Application(s) Topical daily  dextrose 5%. 1000 milliLiter(s) (50 mL/Hr) IV Continuous <Continuous>  dextrose 5%. 1000 milliLiter(s) (100 mL/Hr) IV Continuous <Continuous>  dextrose 50% Injectable 12.5 Gram(s) IV Push once  dextrose 50% Injectable 25 Gram(s) IV Push once  dextrose 50% Injectable 25 Gram(s) IV Push once  glucagon  Injectable 1 milliGRAM(s) IntraMuscular once  heparin  Infusion. 900 Unit(s)/Hr (9 mL/Hr) IV Continuous <Continuous>  hydrALAZINE 50 milliGRAM(s) Oral every 12 hours  insulin lispro (ADMELOG) corrective regimen sliding scale   SubCutaneous three times a day before meals  metoprolol succinate ER 50 milliGRAM(s) Oral daily  metroNIDAZOLE    Tablet 1000 milliGRAM(s) Oral <User Schedule>  neomycin 1000 milliGRAM(s) Oral <User Schedule>  polyethylene glycol 3350 17 Gram(s) Oral two times a day  polyethylene glycol/electrolyte Solution. 1000 milliLiter(s) Oral once  senna 2 Tablet(s) Oral at bedtime  sodium chloride 0.9%. 1000 milliLiter(s) (75 mL/Hr) IV Continuous <Continuous>    MEDICATIONS  (PRN):  dextrose Oral Gel 15 Gram(s) Oral once PRN Blood Glucose LESS THAN 70 milliGRAM(s)/deciliter  heparin   Injectable 5000 Unit(s) IV Push every 6 hours PRN For aPTT less than 40  heparin   Injectable 2500 Unit(s) IV Push every 6 hours PRN For aPTT between 40 - 57  oxycodone    5 mG/acetaminophen 325 mG 1 Tablet(s) Oral every 6 hours PRN Severe Pain (7 - 10)    ---------------------------------------------------------------------------------------------  Vital Signs Last 24 Hrs  T(C): 36.5 (11 Sep 2024 05:08), Max: 36.7 (10 Sep 2024 12:19)  T(F): 97.7 (11 Sep 2024 05:08), Max: 98.1 (10 Sep 2024 12:19)  HR: 62 (11 Sep 2024 05:08) (55 - 62)  BP: 174/79 (11 Sep 2024 06:55) (129/64 - 196/82)  BP(mean): --  RR: 17 (11 Sep 2024 05:08) (16 - 18)  SpO2: --                             8.6    3.44  )-----------( 233      ( 11 Sep 2024 06:17 )             26.0     09-11    134<L>  |  99  |  14  ----------------------------<  92  4.4   |  24  |  1.1    Ca    8.3<L>      11 Sep 2024 06:17  Mg     1.9     09-11      PT/INR - ( 11 Sep 2024 06:17 )   PT: 11.60 sec;   INR: 1.02 ratio         PTT - ( 11 Sep 2024 06:17 )  PTT:56.8 sec  ---------------------------------------------------------------------------------------------  ASSESSMENT/ PLAN:   79F w/ large pelvis mass. Gyn/onc planning possible surgical excision. IR is consulted for preoperative IVC filter placement.    - Consent obtained.     If there are any questions please call [phone], after hours and on weekends please call [phone] and ask for the diagnostics radiology resident on call.

## 2024-09-12 LAB
ALBUMIN SERPL ELPH-MCNC: 2.4 G/DL — LOW (ref 3.5–5.2)
ALP SERPL-CCNC: 47 U/L — SIGNIFICANT CHANGE UP (ref 30–115)
ALT FLD-CCNC: 15 U/L — SIGNIFICANT CHANGE UP (ref 0–41)
ANION GAP SERPL CALC-SCNC: 12 MMOL/L — SIGNIFICANT CHANGE UP (ref 7–14)
ANION GAP SERPL CALC-SCNC: 16 MMOL/L — HIGH (ref 7–14)
AST SERPL-CCNC: 31 U/L — SIGNIFICANT CHANGE UP (ref 0–41)
BASOPHILS # BLD AUTO: 0 K/UL — SIGNIFICANT CHANGE UP (ref 0–0.2)
BASOPHILS NFR BLD AUTO: 0 % — SIGNIFICANT CHANGE UP (ref 0–1)
BILIRUB SERPL-MCNC: 0.7 MG/DL — SIGNIFICANT CHANGE UP (ref 0.2–1.2)
BUN SERPL-MCNC: 12 MG/DL — SIGNIFICANT CHANGE UP (ref 10–20)
BUN SERPL-MCNC: 15 MG/DL — SIGNIFICANT CHANGE UP (ref 10–20)
CALCIUM SERPL-MCNC: 7.5 MG/DL — LOW (ref 8.4–10.5)
CALCIUM SERPL-MCNC: 7.9 MG/DL — LOW (ref 8.4–10.4)
CHLORIDE SERPL-SCNC: 102 MMOL/L — SIGNIFICANT CHANGE UP (ref 98–110)
CHLORIDE SERPL-SCNC: 105 MMOL/L — SIGNIFICANT CHANGE UP (ref 98–110)
CO2 SERPL-SCNC: 14 MMOL/L — LOW (ref 17–32)
CO2 SERPL-SCNC: 17 MMOL/L — SIGNIFICANT CHANGE UP (ref 17–32)
CREAT SERPL-MCNC: 0.9 MG/DL — SIGNIFICANT CHANGE UP (ref 0.7–1.5)
CREAT SERPL-MCNC: 1.1 MG/DL — SIGNIFICANT CHANGE UP (ref 0.7–1.5)
EGFR: 52 ML/MIN/1.73M2 — LOW
EGFR: 66 ML/MIN/1.73M2 — SIGNIFICANT CHANGE UP
EOSINOPHIL # BLD AUTO: 0 K/UL — SIGNIFICANT CHANGE UP (ref 0–0.7)
EOSINOPHIL NFR BLD AUTO: 0 % — SIGNIFICANT CHANGE UP (ref 0–8)
GAS PNL BLDA: SIGNIFICANT CHANGE UP
GLUCOSE BLDC GLUCOMTR-MCNC: 129 MG/DL — HIGH (ref 70–99)
GLUCOSE BLDC GLUCOMTR-MCNC: 177 MG/DL — HIGH (ref 70–99)
GLUCOSE BLDC GLUCOMTR-MCNC: 80 MG/DL — SIGNIFICANT CHANGE UP (ref 70–99)
GLUCOSE SERPL-MCNC: 135 MG/DL — HIGH (ref 70–99)
GLUCOSE SERPL-MCNC: 210 MG/DL — HIGH (ref 70–99)
HCT VFR BLD CALC: 34 % — LOW (ref 37–47)
HCT VFR BLD CALC: 35.1 % — LOW (ref 37–47)
HGB BLD-MCNC: 11.4 G/DL — LOW (ref 12–16)
HGB BLD-MCNC: 11.6 G/DL — LOW (ref 12–16)
IMM GRANULOCYTES NFR BLD AUTO: 0.8 % — HIGH (ref 0.1–0.3)
INHIBIN A SERPL-MCNC: 1.5 PG/ML — SIGNIFICANT CHANGE UP
LYMPHOCYTES # BLD AUTO: 0.26 K/UL — LOW (ref 1.2–3.4)
LYMPHOCYTES # BLD AUTO: 6.5 % — LOW (ref 20.5–51.1)
MAGNESIUM SERPL-MCNC: 1.5 MG/DL — LOW (ref 1.8–2.4)
MAGNESIUM SERPL-MCNC: 2.6 MG/DL — HIGH (ref 1.8–2.4)
MCHC RBC-ENTMCNC: 28.7 PG — SIGNIFICANT CHANGE UP (ref 27–31)
MCHC RBC-ENTMCNC: 29.1 PG — SIGNIFICANT CHANGE UP (ref 27–31)
MCHC RBC-ENTMCNC: 33 G/DL — SIGNIFICANT CHANGE UP (ref 32–37)
MCHC RBC-ENTMCNC: 33.5 G/DL — SIGNIFICANT CHANGE UP (ref 32–37)
MCV RBC AUTO: 85.6 FL — SIGNIFICANT CHANGE UP (ref 81–99)
MCV RBC AUTO: 88.2 FL — SIGNIFICANT CHANGE UP (ref 81–99)
MONOCYTES # BLD AUTO: 0.29 K/UL — SIGNIFICANT CHANGE UP (ref 0.1–0.6)
MONOCYTES NFR BLD AUTO: 7.3 % — SIGNIFICANT CHANGE UP (ref 1.7–9.3)
NEUTROPHILS # BLD AUTO: 3.41 K/UL — SIGNIFICANT CHANGE UP (ref 1.4–6.5)
NEUTROPHILS NFR BLD AUTO: 85.4 % — HIGH (ref 42.2–75.2)
NRBC # BLD: 0 /100 WBCS — SIGNIFICANT CHANGE UP (ref 0–0)
NRBC # BLD: 0 /100 WBCS — SIGNIFICANT CHANGE UP (ref 0–0)
PHOSPHATE SERPL-MCNC: 4.3 MG/DL — SIGNIFICANT CHANGE UP (ref 2.1–4.9)
PHOSPHATE SERPL-MCNC: 4.9 MG/DL — SIGNIFICANT CHANGE UP (ref 2.1–4.9)
PLATELET # BLD AUTO: 207 K/UL — SIGNIFICANT CHANGE UP (ref 130–400)
PLATELET # BLD AUTO: 222 K/UL — SIGNIFICANT CHANGE UP (ref 130–400)
PMV BLD: 9.7 FL — SIGNIFICANT CHANGE UP (ref 7.4–10.4)
PMV BLD: 9.9 FL — SIGNIFICANT CHANGE UP (ref 7.4–10.4)
POTASSIUM SERPL-MCNC: 3.9 MMOL/L — SIGNIFICANT CHANGE UP (ref 3.5–5)
POTASSIUM SERPL-MCNC: 4.5 MMOL/L — SIGNIFICANT CHANGE UP (ref 3.5–5)
POTASSIUM SERPL-SCNC: 3.9 MMOL/L — SIGNIFICANT CHANGE UP (ref 3.5–5)
POTASSIUM SERPL-SCNC: 4.5 MMOL/L — SIGNIFICANT CHANGE UP (ref 3.5–5)
PROT SERPL-MCNC: 3.8 G/DL — LOW (ref 6–8)
RBC # BLD: 3.97 M/UL — LOW (ref 4.2–5.4)
RBC # BLD: 3.98 M/UL — LOW (ref 4.2–5.4)
RBC # FLD: 15.4 % — HIGH (ref 11.5–14.5)
RBC # FLD: 15.6 % — HIGH (ref 11.5–14.5)
SODIUM SERPL-SCNC: 132 MMOL/L — LOW (ref 135–146)
SODIUM SERPL-SCNC: 134 MMOL/L — LOW (ref 135–146)
WBC # BLD: 10.91 K/UL — HIGH (ref 4.8–10.8)
WBC # BLD: 3.99 K/UL — LOW (ref 4.8–10.8)
WBC # FLD AUTO: 10.91 K/UL — HIGH (ref 4.8–10.8)
WBC # FLD AUTO: 3.99 K/UL — LOW (ref 4.8–10.8)

## 2024-09-12 PROCEDURE — 71045 X-RAY EXAM CHEST 1 VIEW: CPT | Mod: 26,77

## 2024-09-12 PROCEDURE — 93010 ELECTROCARDIOGRAM REPORT: CPT

## 2024-09-12 PROCEDURE — 44120 REMOVAL OF SMALL INTESTINE: CPT

## 2024-09-12 PROCEDURE — 44140 PARTIAL REMOVAL OF COLON: CPT

## 2024-09-12 PROCEDURE — 88304 TISSUE EXAM BY PATHOLOGIST: CPT | Mod: 26

## 2024-09-12 PROCEDURE — 99291 CRITICAL CARE FIRST HOUR: CPT | Mod: 24,25

## 2024-09-12 PROCEDURE — 88305 TISSUE EXAM BY PATHOLOGIST: CPT | Mod: 26

## 2024-09-12 PROCEDURE — 58953 TAH RAD DISSECT FOR DEBULK: CPT

## 2024-09-12 PROCEDURE — 88360 TUMOR IMMUNOHISTOCHEM/MANUAL: CPT | Mod: 26,1L,59

## 2024-09-12 PROCEDURE — 88341 IMHCHEM/IMCYTCHM EA ADD ANTB: CPT | Mod: 26

## 2024-09-12 PROCEDURE — 71045 X-RAY EXAM CHEST 1 VIEW: CPT | Mod: 26

## 2024-09-12 PROCEDURE — 88307 TISSUE EXAM BY PATHOLOGIST: CPT | Mod: 26

## 2024-09-12 PROCEDURE — 88342 IMHCHEM/IMCYTCHM 1ST ANTB: CPT | Mod: 26

## 2024-09-12 RX ORDER — DEXMEDETOMIDINE HYDROCHLORIDE IN 0.9% SODIUM CHLORIDE 4 UG/ML
0.2 INJECTION INTRAVENOUS
Qty: 200 | Refills: 0 | Status: DISCONTINUED | OUTPATIENT
Start: 2024-09-12 | End: 2024-09-13

## 2024-09-12 RX ORDER — POTASSIUM CHLORIDE 10 MEQ
20 TABLET, EXT RELEASE, PARTICLES/CRYSTALS ORAL ONCE
Refills: 0 | Status: COMPLETED | OUTPATIENT
Start: 2024-09-12 | End: 2024-09-12

## 2024-09-12 RX ORDER — METOPROLOL TARTRATE 100 MG/1
5 TABLET ORAL EVERY 6 HOURS
Refills: 0 | Status: DISCONTINUED | OUTPATIENT
Start: 2024-09-12 | End: 2024-09-14

## 2024-09-12 RX ORDER — HYDROMORPHONE HYDROCHLORIDE 2 MG/1
0.5 TABLET ORAL
Refills: 0 | Status: DISCONTINUED | OUTPATIENT
Start: 2024-09-12 | End: 2024-09-14

## 2024-09-12 RX ORDER — FAMOTIDINE 10 MG/ML
20 INJECTION INTRAVENOUS DAILY
Refills: 0 | Status: DISCONTINUED | OUTPATIENT
Start: 2024-09-12 | End: 2024-09-13

## 2024-09-12 RX ORDER — CHLORHEXIDINE GLUCONATE 40 MG/ML
15 SOLUTION TOPICAL EVERY 12 HOURS
Refills: 0 | Status: DISCONTINUED | OUTPATIENT
Start: 2024-09-12 | End: 2024-09-13

## 2024-09-12 RX ORDER — CHLORHEXIDINE GLUCONATE 40 MG/ML
1 SOLUTION TOPICAL
Refills: 0 | Status: DISCONTINUED | OUTPATIENT
Start: 2024-09-12 | End: 2024-09-18

## 2024-09-12 RX ORDER — CEFOTETAN 2 G/1
2 INJECTION, POWDER, FOR SOLUTION INTRAMUSCULAR; INTRAVENOUS
Refills: 0 | Status: DISCONTINUED | OUTPATIENT
Start: 2024-09-12 | End: 2024-09-12

## 2024-09-12 RX ORDER — ACETAMINOPHEN 325 MG/1
1000 TABLET ORAL ONCE
Refills: 0 | Status: COMPLETED | OUTPATIENT
Start: 2024-09-12 | End: 2024-09-13

## 2024-09-12 RX ORDER — CEFOTETAN 2 G/1
2 INJECTION, POWDER, FOR SOLUTION INTRAMUSCULAR; INTRAVENOUS
Refills: 0 | Status: COMPLETED | OUTPATIENT
Start: 2024-09-12 | End: 2024-09-13

## 2024-09-12 RX ORDER — METOPROLOL TARTRATE 100 MG/1
50 TABLET ORAL DAILY
Refills: 0 | Status: DISCONTINUED | OUTPATIENT
Start: 2024-09-12 | End: 2024-09-12

## 2024-09-12 RX ORDER — HYDRALAZINE HCL 50 MG
50 TABLET ORAL
Refills: 0 | Status: DISCONTINUED | OUTPATIENT
Start: 2024-09-12 | End: 2024-09-12

## 2024-09-12 RX ORDER — HYDROMORPHONE HYDROCHLORIDE 2 MG/1
0.25 TABLET ORAL
Refills: 0 | Status: DISCONTINUED | OUTPATIENT
Start: 2024-09-12 | End: 2024-09-14

## 2024-09-12 RX ADMIN — DEXMEDETOMIDINE HYDROCHLORIDE IN 0.9% SODIUM CHLORIDE 3.15 MICROGRAM(S)/KG/HR: 4 INJECTION INTRAVENOUS at 21:28

## 2024-09-12 RX ADMIN — Medication 100 MILLILITER(S): at 21:28

## 2024-09-12 RX ADMIN — Medication 150 MILLILITER(S): at 00:19

## 2024-09-12 RX ADMIN — Medication 25 GRAM(S): at 15:16

## 2024-09-12 RX ADMIN — CEFOTETAN 100 GRAM(S): 2 INJECTION, POWDER, FOR SOLUTION INTRAMUSCULAR; INTRAVENOUS at 21:27

## 2024-09-12 RX ADMIN — METOPROLOL TARTRATE 5 MILLIGRAM(S): 100 TABLET ORAL at 18:34

## 2024-09-12 RX ADMIN — METOPROLOL TARTRATE 50 MILLIGRAM(S): 100 TABLET ORAL at 05:02

## 2024-09-12 RX ADMIN — Medication 1: at 18:36

## 2024-09-12 RX ADMIN — DEXMEDETOMIDINE HYDROCHLORIDE IN 0.9% SODIUM CHLORIDE 3.15 MICROGRAM(S)/KG/HR: 4 INJECTION INTRAVENOUS at 16:30

## 2024-09-12 RX ADMIN — DEXMEDETOMIDINE HYDROCHLORIDE IN 0.9% SODIUM CHLORIDE 3.15 MICROGRAM(S)/KG/HR: 4 INJECTION INTRAVENOUS at 23:14

## 2024-09-12 RX ADMIN — CHLORHEXIDINE GLUCONATE 1 APPLICATION(S): 40 SOLUTION TOPICAL at 18:38

## 2024-09-12 RX ADMIN — HYDROMORPHONE HYDROCHLORIDE 0.25 MILLIGRAM(S): 2 TABLET ORAL at 23:28

## 2024-09-12 RX ADMIN — Medication 50 MILLIGRAM(S): at 05:02

## 2024-09-12 RX ADMIN — Medication 25 GRAM(S): at 17:28

## 2024-09-12 RX ADMIN — Medication 100 MILLILITER(S): at 14:01

## 2024-09-12 RX ADMIN — HYDROMORPHONE HYDROCHLORIDE 0.25 MILLIGRAM(S): 2 TABLET ORAL at 23:13

## 2024-09-12 RX ADMIN — ACETAMINOPHEN 650 MILLIGRAM(S): 325 TABLET ORAL at 05:02

## 2024-09-12 RX ADMIN — CHLORHEXIDINE GLUCONATE 15 MILLILITER(S): 40 SOLUTION TOPICAL at 18:35

## 2024-09-12 RX ADMIN — Medication 50 MILLIEQUIVALENT(S): at 15:17

## 2024-09-12 NOTE — CHART NOTE - NSCHARTNOTEFT_GEN_A_CORE
Post Operative Note  Patient: SKYLAR ORTIZ 79y (1945) Female   MRN: [id number]  Location: [address]  Visit: 09-04-24 Inpatient  Date: 09-12-24 @ 18:11    Procedure: S/P segmental transverse colon resection with side to side anastomosis    Subjective: Pt is sedated, intubated and mechanically ventilated with vent settings 450/16/40/5  Nausea: N/A, Vomiting: N/A, Ambulating: N/A, Flatus: N/A  Pain Assessment: N/A , Location: N/A , Pain Intensity: N/A     Objective:  Vitals: T(F): 97.9 (09-12-24 @ 14:45), Max: 98 (09-12-24 @ 01:46)  HR: 66 (09-12-24 @ 16:30)  BP: 129/66 (09-12-24 @ 16:30) (114/60 - 166/73)  RR: 16 (09-12-24 @ 16:30)  SpO2: 100% (09-12-24 @ 16:30)  Vent Settings: Mode: AC/ CMV (Assist Control/ Continuous Mandatory Ventilation), RR (machine): 16, TV (machine): 450, FiO2: 50, PEEP: 5, MAP: 9, PIP: 23    In:   09-11-24 @ 07:01  -  09-12-24 @ 07:00  --------------------------------------------------------  IN: 732 mL    09-12-24 @ 07:01  -  09-12-24 @ 18:11  --------------------------------------------------------  IN: 300 mL      IV Fluids: lactated ringers. 1000 milliLiter(s) (100 mL/Hr) IV Continuous <Continuous>      Out:   09-11-24 @ 07:01  -  09-12-24 @ 07:00  --------------------------------------------------------  OUT: 0 mL    09-12-24 @ 07:01  -  09-12-24 @ 18:11  --------------------------------------------------------  OUT: 140 mL      EBL:     Voided Urine:   09-11-24 @ 07:01  -  09-12-24 @ 07:00  --------------------------------------------------------  OUT: 0 mL    09-12-24 @ 07:01  -  09-12-24 @ 18:11  --------------------------------------------------------  OUT: 140 mL      Fischer Catheter: yes no   Drains:   GEORGINA:    ,   Chest Tube:      NG Tube:       Physical Examination:  General Appearance: intubated and sedated   Heart: RRR  Lungs: equal chest rise b/l, mechanically ventilated  Abdomen:  Soft, nontender, nondistended. No rigidity, guarding, or rebound tenderness. midline dressing in place C/D/I  MSK/Extremities: Warm & well-perfused. Peripheral pulses intact.  Skin: Warm, dry. No jaundice.     Medications: [Standing]  acetaminophen   IVPB .. 1000 milliGRAM(s) IV Intermittent once PRN  cefoTEtan  IVPB 2 Gram(s) IV Intermittent <User Schedule>  chlorhexidine 0.12% Liquid 15 milliLiter(s) Oral Mucosa every 12 hours  chlorhexidine 2% Cloths 1 Application(s) Topical <User Schedule>  dexMEDEtomidine Infusion 0.2 MICROgram(s)/kG/Hr IV Continuous <Continuous>  HYDROmorphone  Injectable 0.25 milliGRAM(s) IV Push every 3 hours PRN  HYDROmorphone  Injectable 0.5 milliGRAM(s) IV Push every 3 hours PRN  insulin lispro (ADMELOG) corrective regimen sliding scale   SubCutaneous every 6 hours  lactated ringers. 1000 milliLiter(s) IV Continuous <Continuous>  metoprolol tartrate Injectable 5 milliGRAM(s) IV Push every 6 hours    Medications: [PRN]  acetaminophen   IVPB .. 1000 milliGRAM(s) IV Intermittent once PRN  cefoTEtan  IVPB 2 Gram(s) IV Intermittent <User Schedule>  chlorhexidine 0.12% Liquid 15 milliLiter(s) Oral Mucosa every 12 hours  chlorhexidine 2% Cloths 1 Application(s) Topical <User Schedule>  dexMEDEtomidine Infusion 0.2 MICROgram(s)/kG/Hr IV Continuous <Continuous>  HYDROmorphone  Injectable 0.5 milliGRAM(s) IV Push every 3 hours PRN  HYDROmorphone  Injectable 0.25 milliGRAM(s) IV Push every 3 hours PRN  insulin lispro (ADMELOG) corrective regimen sliding scale   SubCutaneous every 6 hours  lactated ringers. 1000 milliLiter(s) IV Continuous <Continuous>  metoprolol tartrate Injectable 5 milliGRAM(s) IV Push every 6 hours    Labs:                        11.6   3.99  )-----------( 207      ( 12 Sep 2024 14:12 )             35.1     09-12    134<L>  |  105  |  12  ----------------------------<  135<H>  3.9   |  17  |  0.9    Ca    7.5<L>      12 Sep 2024 14:12  Phos  4.3     09-12  Mg     1.5     09-12    TPro  3.8<L>  /  Alb  2.4<L>  /  TBili  0.7  /  DBili  x   /  AST  31  /  ALT  15  /  AlkPhos  47  09-12    PT/INR - ( 11 Sep 2024 06:17 )   PT: 11.60 sec;   INR: 1.02 ratio         PTT - ( 11 Sep 2024 17:14 )  PTT:153.5 sec      Imaging:  No post-op imaging studies    Assessment:  79yFemale patient S/P Segmental transverse colon resection with side to side anastomosis for pelvic mass    Plan:  Keep NPO, maintain IVF hydration  keep OG tube to suction monitor output  Strict I/Os - will continue to monitor UOP  monitor respiratory status, wean vent settings as tolerated   CLD when extubated, advance diet once return of bowel function  Antiemetics/pain control PRN      Date/Time: 09-12-24 @ 18:11

## 2024-09-12 NOTE — PROGRESS NOTE ADULT - ASSESSMENT
80yo with 25cm abdomino-pelvic mass, s/p ExLap, JUSTA, BSO, omentectomy, appendectomy, small bowel resection+reanastomosis, transverse colon resection+reanastomosis, tumor debulking likely from right ovarian, s/p 2u PRBC intraop, on pressors in OR, EBL 500cc, POD 0.      # abdomino-pelvic mass, s/p Ex-Lap  - Ca 125 N, Ca 19-9 N, CEA elevated 4.8, Estradiol 1528  - S/p bowel prep with Golytely, Neomycin, Flagyl.   - S/p GI consult: outpatient colonoscopy  - PT following  - final pathology pending results  - kumar catheter in place, monitor I/Os  - NPO diet  - cefotetan 2g q8r   - heme onc consult    # bilateral  LE DVTs  - VA Duplex LE bilateral: Right lower extremity DVT in peroneal vein and gastrocnemius veins. Left lower extremity DVT in gastrocnemius, posterior tibial, and peroneal   veins.  - Previously on Heparin Drip. IVC filters placed by IR on 9/11 in preparation for the OR.   - At this time, do recommend holding on AC for at least 24 hours.     # Hyponatremia, improving  - most likely due to decreased PO intake     #Mild Hydronephrosis  - seen on CTA/P    - creatinine improving to 0.9    #Small Pleural effusion - resolved  - seen on CT A/P  - on RA   - monitor fluid balance and pulse ox    #Ascending Aortic Aneurysm ~4cm   - S/p normal TTE otherwise on 9/11  - systolic blood pressure was 170 in ED  - previously on toprol xl 50mg daily &  atorvastatin 20mg daily    # HTN  - previously on troprol and hydralazine 50mg BID    #Prediabetes? A1C 5.4%  - FS with ISS     #Pain management  - Tylenol     Care per SICU team.  80yo with 25cm abdomino-pelvic mass, s/p ExLap, JUSTA, BSO, omentectomy, appendectomy, small bowel resection+reanastomosis, transverse colon resection+reanastomosis with assistance from SurgOnc, tumor debulking likely from right ovarian, s/p 2u PRBC intraop, on pressors in OR, EBL 500cc, POD 0.      # abdomino-pelvic mass, likely arising from right adnexa, s/p Ex-Lap (as above)  - PT has been following  - recommend [facility] consult  - final pathology pending results  - kumar catheter in place, monitor I/Os  - NPO diet  - cefotetan 2g q8r     # bilateral  LE DVTs  - VA Duplex LE bilateral: Right lower extremity DVT in peroneal vein and gastrocnemius veins. Left lower extremity DVT in gastrocnemius, posterior tibial, and peroneal veins.  - Previously on Heparin Drip. IVC filters placed by IR on 9/11 in preparation for the OR.   - NO SCDS  - At this time, recommend holding off on AC for at least 24 hours. Will re-assess tomorrow.    #Ascending Aortic Aneurysm ~4cm. HTN.   - S/p normal TTE otherwise on 9/11  - systolic blood pressure was 170 in ED  - previously on toprol xl 50mg daily &  atorvastatin 20mg daily  - previously on hydralazine 50mg BID    #Prediabetes? A1C 5.4%  - FS with ISS     #Pain management  - Tylenol     primary care per SICU team.

## 2024-09-12 NOTE — CONSULT NOTE ADULT - CONSULT REASON
remained intubated postoperatively, intraoperative vasopressor support
Concern for mets
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
IVC filter
mass
pelvic mass bx
pelvic mass

## 2024-09-12 NOTE — CONSULT NOTE ADULT - REASON FOR ADMISSION
groin pain
metastatic ovarian malignancy
groin pain

## 2024-09-12 NOTE — BRIEF OPERATIVE NOTE - NSICDXBRIEFPROCEDURE_GEN_ALL_CORE_FT
PROCEDURES:  Exploratory laparotomy by gynecology 12-Sep-2024 14:03:43  Roxi Waters  JUSTA & BSO 12-Sep-2024 14:03:50  Roxi Waters  Omentectomy 12-Sep-2024 14:09:13  Roxi Waters  Small bowel resection 12-Sep-2024 14:09:26 and reeliceostamosis Roxi Waters  Resection, transverse colon 12-Sep-2024 14:09:45 and rehaileyamosis Roxi Waters  Appendectomy 12-Sep-2024 14:09:51  Roxi Waters  Debulking of pelvic tumor 12-Sep-2024 14:10:08  Roxi Waters

## 2024-09-12 NOTE — BRIEF OPERATIVE NOTE - SPECIMENS
frozen of right ovary and tube with pelvic mass, omentum, proximal small bowel implant, appendix, posterior cul de sac tumor, uterus, left tube and ovary, cervix, small bowel resection, right perihepatic peritoneum, umbilical peritoneum, portion of transverse colon

## 2024-09-12 NOTE — PRE-ANESTHESIA EVALUATION ADULT - NSRADCARDRESULTSFT_GEN_ALL_CORE
1. Technically difficult study.   2. Normal global left ventricular systolic function with a biplane EF of   57%.   3. Normal right ventricular size and function.   4. Mildly enlarged left atrium.   5. No hemodynamically significant valvular disease (trivial AI).   6. No echocardiographic evidence of pulmonary hypertension.   7. Dilatation of the ascending aorta, suboptimally visualized (4.0cm,   2.4cm/m2).

## 2024-09-12 NOTE — CHART NOTE - NSCHARTNOTEFT_GEN_A_CORE
PACU ANESTHESIA ADMISSION NOTE      Procedure:   Post op diagnosis:      __x__  Intubated  TV:__450____       Rate: ____10__      FiO2: __50____    _x___  Patent Airway    ___  Full return of protective reflexes    __  Full recovery from anesthesia / back to baseline status    Vitals  SPO2:-100% on the ventilator  HR:-64  RR:-10  B.P:-145/62  TEMP:-98    Mental Status:  __ Awake   ____ Alert   _____ Drowsy   ___x__ Sedated    Nausea/Vomiting:  ____  NO       ______Yes,   See Post - Op Orders         Pain Scale (0-10):  __0___    Treatment: _x___ None    __x__ See Post - Op/PCA Orders    Post - Operative Fluids:   ___ Oral   ____x See Post - Op Orders    Plan: Discharge:   ____Home       ____Floor     __x___Critical Care    _____  Other:_________________    Comments:  Report endorsed to RN in PACU  Vitals stable  No anesthesia issues or complications noted.  Transfer patient to ICU  when criteria met. Report endorsed to ICU team in pacu at the bedside

## 2024-09-12 NOTE — PROGRESS NOTE ADULT - ASSESSMENT
78yo with 25cm abdomino-pelvic mass, on call to the OR today for ExLap, JUSTA, BSO, possible surgical staging, all other indicated procedures.     # bilateral  LE DVTs  - VA Duplex LE bilateral: Right lower extremity DVT in peroneal vein and gastrocnemius veins. Left lower extremity DVT in gastrocnemius, posterior tibial, and peroneal   veins.  - Patient is high risk given likely malignancy.  - Previously on Heparin Drip. IVC filters placed by IR on 9/11 in preparation for the OR.   - 9/11: H/h 8.6/26. A pos.     # abdomino-pelvic mass, constipation, decreased appetite  - Ca 125 N, Ca 19-9 N, CEA elevated 4.8, Estradiol 15  - Iron 36, Transferrin 20, TIBC low 178, LDH normal, Haptoglobin 224, retics 1.6, RBC 3.28  - S/p bowel prep with Golytely, Neomycin, Flagyl.   - S/p GI consult: outpatient colonoscopy  - encourage ambulation - PT following  - On call to the OR today for ExLap @0730 in [facility] OR. Has been NPO since midnight with IVF @150cc/hr    # Hyponatremia, improving  - most likely due to decreased PO intake     #Mild Hydronephrosis  - seen on CTA/P    - creat 1.0, unknown baseline. Currently stable at 1.1    #Small Pleural effusion - resolved  - seen on CT A/P  - on RA   - CXR this AM no cardiopulmonary disease  - monitor fluid balance and pulse ox    #Ascending Aortic Aneurysm ~4cm   - S/p normal TTE otherwise on 9/11  - systolic blood pressure was 170 in ED  - C/w toprol xl 50mg daily  - atorvastatin 20mg daily    # HTN  - continue with troprol and hydralazine 50mg BID    #Prediabetes? A1C 5.4%  - FS with ISS     #Pain management  - Tylenol    78yo with 25cm abdomino-pelvic mass, on call to the OR today for ExLap, JUSTA, BSO, possible surgical staging, all other indicated procedures.     # bilateral  LE DVTs  - VA Duplex LE bilateral: Right lower extremity DVT in peroneal vein and gastrocnemius veins. Left lower extremity DVT in gastrocnemius, posterior tibial, and peroneal   veins.  - Patient is high risk given likely malignancy.  - Previously on Heparin Drip. IVC filters placed by IR on 9/11 in preparation for the OR.   - 9/11: H/h 8.6/26. A pos.     # abdomino-pelvic mass, constipation, decreased appetite  - Ca 125 N, Ca 19-9 N, CEA elevated 4.8, Estradiol 15  - Iron 36, Transferrin 20, TIBC low 178, LDH normal, Haptoglobin 224, retics 1.6, RBC 3.28  - S/p bowel prep with Golytely, Neomycin, Flagyl.   - S/p GI consult: outpatient colonoscopy  - encourage ambulation - PT following  - On call to the OR today for ExLap @0730 in [facility] OR. Has been NPO since midnight with IVF @150cc/hr  - Cleared by medicine, cardiology, and anesthesia    # Hyponatremia, improving  - most likely due to decreased PO intake     #Mild Hydronephrosis  - seen on CTA/P    - creat 1.0, unknown baseline. Currently stable at 1.1    #Small Pleural effusion - resolved  - seen on CT A/P  - on RA   - CXR this AM no cardiopulmonary disease  - monitor fluid balance and pulse ox    #Ascending Aortic Aneurysm ~4cm   - S/p normal TTE otherwise on 9/11  - systolic blood pressure was 170 in ED  - C/w toprol xl 50mg daily  - atorvastatin 20mg daily    # HTN  - continue with troprol and hydralazine 50mg BID    #Prediabetes? A1C 5.4%  - FS with ISS     #Pain management  - Tylenol

## 2024-09-12 NOTE — PRE-ANESTHESIA EVALUATION ADULT - NSANTHADDINFOFT_GEN_ALL_CORE
GA planned; Risks discussed including dental injury and more serious complications including cardiac and pulmonary complications and stroke.  Patient expresses understanding with regard to risks of anesthesia and wishes to proceed.  art line  cvc if needed  2u prbcs crossmatched  poss postop intubation  discussed using family as  as no [facility]  available GA planned; Risks discussed including dental injury and more serious complications including cardiac and pulmonary complications and stroke.  Patient expresses understanding with regard to risks of anesthesia and wishes to proceed.  art line  cvc if needed  2u prbcs crossmatched  poss postop intubation    Used [employer] [place]  to confirm that patient wants her cousin, Vi, to translate for her (Emirati)

## 2024-09-12 NOTE — CONSULT NOTE ADULT - ASSESSMENT
79y Female  w/     PMHx of     and a PSHx of     now admitted to SICU for **********    **Surgery**  - Pelvic mass    Colonic mass    Pelvic mass    Exploratory laparotomy by gynecology    JUSTA & BSO    Omentectomy    Small bowel resection    Resection, transverse colon    Appendectomy    Debulking of pelvic tumor          NEURO/PSYCH:  #Sedation while intubated  - RASS goal: 0 to -1  - dexMEDEtomidine Infusion 0.2 MICROgram(s)/kG/Hr IV Continuous <Continuous>    #Acute pain  - acetaminophen IV 1000mg q6h PRN for mild pain (1 - 3)  - HYDROmorphone  Injectable 0.25 milliGRAM(s) IV Push every 3 hours PRN Moderate Pain (4 - 6)  - HYDROmorphone  Injectable 0.5 milliGRAM(s) IV Push every 3 hours PRN Severe Pain (7 - 10)    RESP:   #mechanical intubation  - Intubated on , remained intubated postoperatively. ETT lip line 22cm. A/C 450/16/40/5  - wean as tolerated  - Postoperative AB-12 @ 14:07--7.30 / 33 / 183 / 16 / Lactate 1.8 / iCal 1.13    #Activity  - increase as tolerated    Postoperative CXR: OG tube in place, no acute pulmonary pathology    CARDIAC:   #acute hypotension intraoperatively requiring vasopressor support  - hypotensive in OR, placed on levophed gtt, max dose of 0.04 mg/kg/hr. Currently off    #PMHx of HTN, ascending aortic aneurysm found on imaging  - metoprolol succinate 50mg PO --> metoprolol tartrate IV 5mg q6h  - holding hydralazine 50mg q12h    Imaging:   - EKG: HR 59, sinus bradycardia. QTc 540 --> Mg 2g x 2 doses given    GI/NUTR:   #Diet  - Diet, NPO (24 @ 15:25) [Active]  - OG tube at 45cm length, LWCS  - aspiration precautions, HOB 30    #GI Prophylaxis  - none indicated     #Bowel regimen  - holding    /RENAL:   #urine output in critically ill  - indwelling kumar (placed  - )  #IVF  - LR at 100mL/hr  #hypomagnesemia  - Mg 1.5 --> 2g Mg x 2 doses    Labs:          BUN/Cr -  14/1.1  -->,  12/0.9  -->          Electrolytes-( @ 14:12)Na 134 // K 3.9 // Mg 1.5 // Phos 4.3   GYN:   #Metastatic malignancy, 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, now s/p exploratory laparotomy, excision of 25cm pelvic mass, JUSTA/BSO, partial resection and reanastomosis of small bowel and transverse colon  -     HEME/ONC:   #bilateral LE DVTs present on admission, s/p IVC filter ()  - Right lower extremity DVT in peroneal vein and gastrocnemius veins, left lower extremity DVT in gastrocnemius, posterior tibial, and peroneal veins. Pt was on heparin gtt preoperatively.  - IVC filter placed by IR on   - Mechanical DVT contraindicated due to active DVTs  - hold DVT ppx for 24h per Gyn    #PMHx of    Labs:   Labs: Hgb/Hct:  8.6/26.0  ( @ 06:17)  -->,  11.6/35.1  ( @ 14:12)  -->                      Platelets:  233  -->,  207  -->                 PTT/INR:  153.5/--  --->             Home:      T&S:  Expires: **  Blood Consent-obtain if acute anemia, q6 CBC    ID:  #leukopenia likely in the setting of malignancy  WBC- 3.16  --->>,  3.44  --->>,  3.99  --->>  Temp trend- 24hrs T(F): 97.9 ( @ 14:45), Max: 98.5 ( @ 17:47)  #postoperative prophylaxis  Current antibiotics-cefoTEtan  IVPB 2g q8h for 24h postop    ENDO:  #PMHx of prediabetes  - A1c,  - 5.4%  - FSG q6 while NPO  - Glucose goal 140-180  - Insulin sliding scale    MSK:  #no active issues  - Activity - Increase As Tolerated:     DERM:  - DTI screen pending    LINES/DRAINS:  PIV, Kumar Indwelling Urethral Catheter, Arterial Line ( - ), ETT ( - ), OG ( - )    ADVANCED DIRECTIVES:      HCP/Emergency Contact - silverio Thompson. [phone]    INDICATION FOR SICU: Intra-abdominal and pelvic swelling of mass or lump             DISPO:   SICU    Case discussed with attending Dr. Leonardo 79y Female  w/     PMHx of     and a PSHx of     now admitted to SICU for **********    **Surgery**  - Pelvic mass    Colonic mass    Pelvic mass    Exploratory laparotomy by gynecology    JUSTA & BSO    Omentectomy    Small bowel resection    Resection, transverse colon    Appendectomy    Debulking of pelvic tumor          NEURO/PSYCH:  #Sedation while intubated  - RASS goal: 0 to -1  - dexMEDEtomidine Infusion 0.2 MICROgram(s)/kG/Hr IV Continuous <Continuous>    #Acute pain  - acetaminophen IV 1000mg q6h PRN for mild pain (1 - 3)  - HYDROmorphone  Injectable 0.25 milliGRAM(s) IV Push every 3 hours PRN Moderate Pain (4 - 6)  - HYDROmorphone  Injectable 0.5 milliGRAM(s) IV Push every 3 hours PRN Severe Pain (7 - 10)    RESP:   #mechanical intubation  - Intubated on , remained intubated postoperatively. ETT lip line 22cm. A/C 450/16/40/5  - wean as tolerated  - Postoperative AB-12 @ 14:07--7.30 / 33 / 183 / 16 / Lactate 1.8 / iCal 1.13    #Activity  - increase as tolerated    Postoperative CXR: OG tube in place, no acute pulmonary pathology    CARDIAC:   #acute hypotension intraoperatively requiring vasopressor support  - hypotensive in OR, placed on levophed gtt, max dose of 0.04 mg/kg/hr. Currently off    #PMHx of HTN, ascending aortic aneurysm found on imaging  - metoprolol succinate 50mg PO --> metoprolol tartrate IV 5mg q6h  - holding hydralazine 50mg q12h    Imaging:   - EKG: HR 59, sinus bradycardia. QTc 540 --> Mg 2g x 2 doses given    GI/NUTR:   #partial resection and reanastomosis of transverse colon and small bowel    #Diet  - Diet, NPO (24 @ 15:25) [Active]  - OG tube at 45cm length, LWCS  - aspiration precautions, HOB 30    #GI Prophylaxis  - none indicated     #Bowel regimen  - holding    /RENAL:   #urine output in critically ill  - indwelling kumar (placed  - )  #IVF  - LR at 100mL/hr  #hypomagnesemia  - Mg 1.5 --> 2g Mg x 2 doses    Labs:          BUN/Cr -  14/1.1  -->,  12/0.9  -->          Electrolytes-( @ 14:12)Na 134 // K 3.9 // Mg 1.5 // Phos 4.3   GYN:   #Metastatic malignancy, 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, now s/p exploratory laparotomy, excision of 25cm pelvic mass, JUSTA/BSO, partial resection and reanastomosis of small bowel and transverse colon on   - remained intubated postoperatively  - required max levophed gtt 0.04, currently off  - 24h postop cefotetan q8h    HEME/ONC:   #bilateral LE DVTs present on admission, s/p IVC filter ()  - Right lower extremity DVT in peroneal vein and gastrocnemius veins, left lower extremity DVT in gastrocnemius, posterior tibial, and peroneal veins. Pt was on heparin gtt preoperatively.  - IVC filter placed by IR on   - Mechanical DVT contraindicated due to active DVTs  - hold DVT ppx for 24h per Gyn    #PMHx of    Labs:   Labs: Hgb/Hct:  8.6/26.0  ( @ 06:17)  -->,  11.6/35.1  ( @ 14:12)  -->                      Platelets:  233  -->,  207  -->                 PTT/INR:  153.5/--  --->             Home:      T&S:  Expires: **  Blood Consent-obtain if acute anemia, q6 CBC    ID:  #leukopenia likely in the setting of malignancy  WBC- 3.16  --->>,  3.44  --->>,  3.99  --->>  Temp trend- 24hrs T(F): 97.9 ( @ 14:45), Max: 98.5 ( @ 17:47)  #postoperative prophylaxis  Current antibiotics-cefoTEtan  IVPB 2g q8h for 24h postop    ENDO:  #PMHx of prediabetes  - A1c,  - 5.4%  - FSG q6 while NPO  - Glucose goal 140-180  - Insulin sliding scale    MSK:  #no active issues  - Activity - Increase As Tolerated:     DERM:  - DTI screen pending    LINES/DRAINS:  PIV, Kumar Indwelling Urethral Catheter, Arterial Line ( - ), ETT ( - ), OG ( - )    ADVANCED DIRECTIVES:      HCP/Emergency Contact - Donna jaylanlouise. [phone]    INDICATION FOR SICU: Intra-abdominal and pelvic swelling of mass or lump             DISPO:   SICU    Case discussed with attending Dr. Leonardo 79y Female w/ PMHx of metastatic colon/ovarian cancer now s/p exploratory laparotomy, excision of 25cm pelvic mass, JUSTA/BSO, partial resection and reanastomosis of small bowel and transverse colon on , complicated by acute hypotension requiring vasopressor support. SICU consulted and pt admitted to SICU for hemodynamic monitoring and mechanical ventilation.    NEURO/PSYCH:  #Sedation while intubated  - RASS goal: 0 to -1  - dexMEDEtomidine Infusion 0.2 MICROgram(s)/kG/Hr IV Continuous <Continuous>    #Acute pain  - acetaminophen IV 1000mg q6h PRN for mild pain (1 - 3)  - HYDROmorphone  Injectable 0.25 milliGRAM(s) IV Push every 3 hours PRN Moderate Pain (4 - 6)  - HYDROmorphone  Injectable 0.5 milliGRAM(s) IV Push every 3 hours PRN Severe Pain (7 - 10)    RESP:   #mechanical intubation  - Intubated on , remained intubated postoperatively. ETT lip line 22cm. A/C 450/16/40/5  - wean as tolerated  - Postoperative AB-12 @ 14:07--7.30 / 33 / 183 / 16 / Lactate 1.8 / iCal 1.13    #Activity  - increase as tolerated    Postoperative CXR: OG tube in place, no acute pulmonary pathology    CARDIAC:   #acute hypotension intraoperatively requiring vasopressor support  - hypotensive in OR, placed on levophed gtt, max dose of 0.04 mg/kg/hr. Currently off    #PMHx of HTN, ascending aortic aneurysm found on imaging  - metoprolol succinate 50mg PO --> metoprolol tartrate IV 5mg q6h  - holding hydralazine 50mg q12h    Imaging:   - EKG: HR 59, sinus bradycardia. QTc 540 --> Mg 2g x 2 doses given    GI/NUTR:   #partial resection and reanastomosis of transverse colon and small bowel  - NPO  - OG tube at 45cm length, LWCS  - aspiration precautions, HOB 30    #GI Prophylaxis  - none indicated     #Bowel regimen  - holding    /RENAL:   #metabolic acidosis  - 1 amp bicarb given in OR without change  - postop ABG -  @ 14:07-- 7.30 / 33 / 183 / 16 / Lactate 1.8  - pending repeat ABG    #urine output in critically ill  - indwelling kumar (placed  - )  #IVF  - LR at 100mL/hr  #hypomagnesemia  - Mg 1.5 --> 2g Mg x 2 doses    Labs:          BUN/Cr -  14/1.1  -->,  12/0.9  -->          Electrolytes-( @ 14:12)Na 134 // K 3.9 // Mg 1.5 // Phos 4.3   GYN:   #Metastatic malignancy, 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, now s/p exploratory laparotomy, excision of 25cm pelvic mass, JUSTA/BSO, partial resection and reanastomosis of small bowel and transverse colon on   - remained intubated postoperatively  - acutely hypotensive intraop, required max levophed gtt 0.04, currently off  - 2u PRBC intraop  - 24h postop cefotetan q8h  - hold DVT PPx for 24h per Gyn  - f/up further Gyn/onc workup    HEME/ONC:   #acute anemia  - 2u PRBC intraoperatively on , postop Hgb 11.6    #bilateral LE DVTs present on admission, s/p IVC filter ()  - Right lower extremity DVT in peroneal vein and gastrocnemius veins, left lower extremity DVT in gastrocnemius, posterior tibial, and peroneal veins. Pt was on heparin gtt preoperatively.  - IVC filter placed by IR on   - Mechanical DVT contraindicated due to active DVTs  - hold DVT ppx for 24h per Gyn    Labs:   Labs: Hgb/Hct:  8.6/26.0  ( @ 06:17)  -->,  11.6/35.1  ( @ 14:12)  -->                      Platelets:  233  -->,  207  -->                 PTT/INR:  153.5/--  --->       T&S:  Expires:     ID:  #leukopenia likely in the setting of malignancy  WBC- 3.16  --->>,  3.44  --->>,  3.99  --->>  Temp trend- 24hrs T(F): 97.9 ( @ 14:45), Max: 98.5 ( @ 17:47)  #postoperative prophylaxis  Current antibiotics-cefoTEtan  IVPB 2g q8h for 24h postop    ENDO:  #PMHx of prediabetes  - A1c,  - 5.4%  - FSG q6 while NPO  - Glucose goal 140-180  - Insulin sliding scale    MSK:  #no active issues  - Activity - Increase As Tolerated:     DERM:  - DTI screen pending    LINES/DRAINS:  PIV, Kumar Indwelling Urethral Catheter, Arterial Line ( - ), ETT ( - ), OG ( - )    ADVANCED DIRECTIVES:      HCP/Emergency Contact - silverio Thompson. [phone]    INDICATION FOR SICU: Intra-abdominal and pelvic swelling of mass or lump             DISPO:   SICU    Case discussed with attending Dr. Leonardo 79y Female w/ PMHx of metastatic colon/ovarian cancer now s/p exploratory laparotomy, excision of 25cm pelvic mass, JUSTA/BSO, partial resection and reanastomosis of small bowel and transverse colon on , complicated by acute hypotension requiring vasopressor support. SICU consulted and pt admitted to SICU for hemodynamic monitoring and mechanical ventilation.    NEURO/PSYCH:  #Sedation while intubated  - RASS goal: 0 to -1  - dexMEDEtomidine Infusion 0.2 MICROgram(s)/kG/Hr IV Continuous <Continuous>    #Acute pain  - acetaminophen IV 1000mg q6h PRN for mild pain (1 - 3)  - HYDROmorphone  Injectable 0.25 milliGRAM(s) IV Push every 3 hours PRN Moderate Pain (4 - 6)  - HYDROmorphone  Injectable 0.5 milliGRAM(s) IV Push every 3 hours PRN Severe Pain (7 - 10)    RESP:   #mechanical intubation  - Intubated on , remained intubated postoperatively. ETT lip line 22cm. A/C 450/16/40/5  - wean as tolerated  - Postoperative AB-12 @ 14:07--7.30 / 33 / 183 / 16 / Lactate 1.8 / iCal 1.13    #Activity  - increase as tolerated    Postoperative CXR: OG tube in place, no acute pulmonary pathology    CARDIAC:   #acute hypotension intraoperatively requiring vasopressor support  - hypotensive in OR, placed on levophed gtt, max dose of 0.04 mg/kg/hr. Currently off    #PMHx of HTN, ascending aortic aneurysm found on imaging  - metoprolol succinate 50mg PO --> metoprolol tartrate IV 5mg q6h  - holding hydralazine 50mg q12h  - holding atorvastatin 20mg QD    Imaging:   - EKG: HR 59, sinus bradycardia. QTc 540 --> Mg 2g x 2 doses given    GI/NUTR:   #partial resection and reanastomosis of transverse colon and small bowel  - Gastroenterology: outpatient colonoscopy for colonic mass seen on CT  - NPO  - OG tube at 45cm length, LWCS  - aspiration precautions, HOB 30    #GI Prophylaxis  - none indicated     #Bowel regimen  - holding    /RENAL:   #metabolic acidosis  - 1 amp bicarb given in OR without change  - postop ABG -  @ 14:07-- 7.30 / 33 / 183 / 16 / Lactate 1.8  - pending repeat ABG    #urine output in critically ill  - indwelling kumar (placed  - )  #IVF  - LR at 100mL/hr  #hypomagnesemia  - Mg 1.5 --> 2g Mg x 2 doses    Labs:          BUN/Cr -  14/1.1  -->,  12/0.9  -->          Electrolytes-( @ 14:12)Na 134 // K 3.9 // Mg 1.5 // Phos 4.3     GYN:   #Metastatic malignancy, 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, now s/p exploratory laparotomy, excision of 25cm pelvic mass, JUSTA/BSO, partial resection and reanastomosis of small bowel and transverse colon on   - remained intubated postoperatively  - acutely hypotensive intraop, required max levophed gtt 0.04, currently off  - 2u PRBC intraop  - 24h postop cefotetan q8h  - hold DVT PPx for 24h per Gyn  - f/up further Gyn/onc workup    HEME/ONC:   #acute anemia  - 2u PRBC intraoperatively on , postop Hgb 11.6    #bilateral LE DVTs present on admission, s/p IVC filter ()  - Right lower extremity DVT in peroneal vein and gastrocnemius veins, left lower extremity DVT in gastrocnemius, posterior tibial, and peroneal veins. Pt was on heparin gtt preoperatively.  - Vascular: Consulted for r/o DVTs. Bilateral lower extremity duplex showed Right lower extremity DVT in peroneal vein and gastrocnemius veins and Left lower extremity DVT in gastrocnemius, posterior tibial, and peroneal veins; recommend AC for 3 months and outpatient follow-up.   - IVC filter placed by IR on   - Mechanical DVT contraindicated due to active DVTs  - hold DVT ppx for 24h per Gyn    Labs:   Labs: Hgb/Hct:  8.6/26.0  ( @ 06:17)  -->,  11.6/35.1  ( @ 14:12)  -->                      Platelets:  233  -->,  207  -->                 PTT/INR:  153.5/--  --->       T&S:  Expires:     ID:  #leukopenia likely in the setting of malignancy  WBC- 3.16  --->>,  3.44  --->>,  3.99  --->>  Temp trend- 24hrs T(F): 97.9 ( @ 14:45), Max: 98.5 ( @ 17:47)  #postoperative prophylaxis  Current antibiotics-cefoTEtan  IVPB 2g q8h for 24h postop    ENDO:  #PMHx of prediabetes  - A1c,  - 5.4%  - FSG q6 while NPO  - Glucose goal 140-180  - Insulin sliding scale    MSK:  #no active issues  - Activity - Increase As Tolerated:     DERM:  - DTI screen pending    LINES/DRAINS:  PIV, Kumar Indwelling Urethral Catheter, Arterial Line ( - ), ETT ( - ), OG ( - )    ADVANCED DIRECTIVES:      HCP/Emergency Contact - silverio Thompson. [phone]    INDICATION FOR SICU: Intra-abdominal and pelvic swelling of mass or lump             DISPO:   SICU    Case discussed with attending Dr. Leonardo

## 2024-09-12 NOTE — PRE-ANESTHESIA EVALUATION ADULT - NSANTHPMHFT_GEN_ALL_CORE
79y year old with PMHx of prediabetes and is s/p ex lap surgery in [facility], likely due to metastatic cancer, possibly primarily from colon who presented to the ED with right groin pain. Patient came back from [facility] last week and presented with constipation, groin pain radiating towards the leg, found to have a large multiloculated cystic measuring 24.7 x 12.5 x 22.3 cm, extending from the pelvis into the upper abdomen. Patient believes that about 2-3 months ago, patient had a surgery in [facility] where they opened her abdomen and noted the large mass and then closed it back up without any intervention.    # bilateral  LE DVTs  - VA Duplex LE bilateral: Right lower extremity DVT in peroneal vein and gastrocnemius veins. Left lower extremity DVT in gastrocnemius, posterior tibial, and peroneal   veins.  - Patient is high risk given likely malignancy.  - c/w heparin full AC  - daily cbc  - transfuse for Hgb < 7, keep active T&S    #METASTATIC CANCER, possibly colon as primary   #Large multiloculated cystic mass with areas of soft tissue:  #Constipation   #Decreased Appetite  - ED vitals notable for  ,afebrile, on RA; ED labs notable for neutrophil 90.3%, potassium 5.2 hemolyzed   CTAP : Large multiloculated cystic mass with areas of soft tissue, measuring 24.7 x 12.5 x 22.3 cm, extending from the pelvis into the upper abdomen. Questionable filling defects in both the right and left common femoral vein and deep venous thrombosis is not excluded. Indeterminate multiple fluid-filled/cystic lesions within the abdomen measuring up to 6.5 cm, felt to likely be related to cystic metastatic disease. Soft tissue density within the transverse colon adjacent to the splenic flexure on image 52 series 2. Findings may be related to focal stool. Subcutaneous nodule measuring 1.8 cm and subcentimeter sclerotic focus in T12, metastatic disease is not excluded  - Dr Jefferson, reviewed records from [facility], pt was found to have metastatic cancer, possibly colon as primary. Was informed needed to start chemo, however there was no chemo available there at the time.  - pain control  - EKG for QTc, f/u   - Zofran PRN for vomiting   - Calorie count done- Magic Cup 2x/day (290 kcal, 9 gm protein/serving) to optimize PO intake. patient is at baseline PO intake as per family  - continue with Bowel regimen and monitor Bowel movement  - Ca 125 N, Ca 19-9 N, CEA elevated 4.8, Estradiol 15  - Iron 36, Transferrin 20, TIBC low 178, LDH normal, Haptoglobin 224, retics 1.6, RBC 3.28  - IR following, IVC filter placed today  - Gyne following - planning to schedule pelvic mass resection mariya 9/11, NPO/IVF at midnight. active T&S drawn on 9/11. On 9/11 in the afternoon, patient should start drinking Golytely 4L at noon  - GI following- as per GI, outpatient colonoscopy  - bowel regimen  - serial abdominal physical exams   - avoid opiates  - monitor BMs  - encourage ambulation - PT following  - medically cleared     # Hyponatremia  - most likely due to decreased PO intake   - Na 132 > 128 (9/9) on NS @ 75ml/hr  - calorie count, encourage po intake    #Mild Hydronephrosis  - seen on CTA/P    - creat 1.0, unknown baseline   - bladder scan q6 for rentention   - can consider urology c/s if condition worsen     #Small Pleural effusion - resolved  - seen on CT A/P  - on RA   - CXR this AM no cardiopulmonary disease  - monitor fluid balance and pulse ox    #AORTIC ANEURYSM  4.5 cm   - systolic blood pressure was 170 in ED  - monitor Blood pressure, has been wnl   - unable to confirm med rec  - will start with toprol xl 50mg daily  - atorvastatin 20mg daily    # HTN  - continue with troprol and hydralazine     #Prediabetes  - FS  - ISS

## 2024-09-12 NOTE — PRE-OP CHECKLIST - 2.
patient speaks a certain English dialect  used for consent and patient cousin mo at bedside side who speaks dialect. language life does not have the specific dialect available.

## 2024-09-12 NOTE — PROGRESS NOTE ADULT - SUBJECTIVE AND OBJECTIVE BOX
PGY4 Note    POD #: 0    s/p ExLap, JUSTA, BSO, omentectomy, appendectomy, small bowel resection+reanastomosis, transverse colon resection+reanastomosis, tumor debulking likely from right ovarian, s/p 2u PRBC intraop, on pressors, EBL 500cc    HPI: Pt seen at bedside, still in PACU as she awaits a bed in the SICU. She is still intubated and sedated. Per RN, no acute changes. The pressors have been discontinued.     Physical Exam  Vital Signs Last 24 Hrs  T(F): 97.9 (12 Sep 2024 14:45), Max: 98.5 (11 Sep 2024 17:47)  HR: 66 (12 Sep 2024 16:30) (60 - 67)  BP: 129/66 (12 Sep 2024 16:30) (114/60 - 166/73)  RR: 16 (12 Sep 2024 16:30) (16 - 19)    Physical exam:  General - Intubated and sedated  Abdomen:  - Soft, nontender, mildly distended, vertical Ex-Lap incision with dressing on top and staples underneath c/d/i  Pelvis/Vagina - No bleeding  Extremities - No calf tenderness, no swelling   - Fischer catheter in place with clear urine     Labs:                        11.6   3.99  )-----------( 207      ( 12 Sep 2024 14:12 )             35.1                         8.6    3.44  )-----------( 233      ( 11 Sep 2024 06:17 )             26.0     134  |  105  |  12  ----------------------------<135  3.9  |  17  |  0.9    Magnesium: 1.5 mg/dL (09-12-24 @ 14:12)    Phosphorus: 4.3 mg/dL (09-12-24 @ 14:12)    I&O's Detail    11 Sep 2024 07:01  -  12 Sep 2024 07:00  --------------------------------------------------------  IN:    Heparin Infusion: 107 mL    Lactated Ringers: 625 mL  Total IN: 732 mL    OUT:  Total OUT: 0 mL    Total NET: 732 mL      12 Sep 2024 07:01  -  12 Sep 2024 17:31  --------------------------------------------------------  IN:    Lactated Ringers: 300 mL  Total IN: 300 mL    OUT:    Indwelling Catheter - Urethral (mL): 140 mL  Total OUT: 140 mL    Total NET: 160 mL         PGY4 Note    POD #: 0    s/p ExLap, JUSTA, BSO, omentectomy, appendectomy, small bowel resection+reanastomosis, transverse colon resection+reanastomosis, tumor debulking likely from right ovarian, s/p 2u PRBC intraop, on pressors, EBL 500cc    HPI: Pt seen at bedside, still in PACU as she awaits a bed in the SICU. She is still intubated and sedated. Per RN, no acute changes. The pressors have been discontinued.     Physical Exam  Vital Signs Last 24 Hrs  T(F): 97.9 (12 Sep 2024 14:45), Max: 98.5 (11 Sep 2024 17:47)  HR: 66 (12 Sep 2024 16:30) (60 - 67)  BP: 129/66 (12 Sep 2024 16:30) (114/60 - 166/73)  RR: 16 (12 Sep 2024 16:30) (16 - 19)    Physical exam:  General - Intubated and sedated. OG tube in place.  Abdomen:  - Soft, nontender, mildly distended, vertical Ex-Lap incision with dressing on top and staples underneath c/d/i  Pelvis/Vagina - No bleeding  Extremities - No calf tenderness, no swelling   - Fischer catheter in place with clear urine     Labs:                        11.6   3.99  )-----------( 207      ( 12 Sep 2024 14:12 )             35.1                         8.6    3.44  )-----------( 233      ( 11 Sep 2024 06:17 )             26.0     134  |  105  |  12  ----------------------------<135  3.9  |  17  |  0.9    Magnesium: 1.5 mg/dL (09-12-24 @ 14:12)    Phosphorus: 4.3 mg/dL (09-12-24 @ 14:12)    I&O's Detail    11 Sep 2024 07:01  -  12 Sep 2024 07:00  --------------------------------------------------------  IN:    Heparin Infusion: 107 mL    Lactated Ringers: 625 mL  Total IN: 732 mL    OUT:  Total OUT: 0 mL    Total NET: 732 mL      12 Sep 2024 07:01  -  12 Sep 2024 17:31  --------------------------------------------------------  IN:    Lactated Ringers: 300 mL  Total IN: 300 mL    OUT:    Indwelling Catheter - Urethral (mL): 140 mL  Total OUT: 140 mL    Total NET: 160 mL

## 2024-09-12 NOTE — CONSULT NOTE ADULT - ATTENDING COMMENTS
I edited the note
80 y/o native of [facility], arrived in the [place] 2 weeks prior.  She underwent a surgical procedure in her country 2-3 months prior for a abdominal mass, but this was aborted for unclear reason, no pathology was given.    On exam there is a large abdominopelvic mass but no other abnormal findings.    CT reviewed by me personally shows a complex mostly solid mass around 25-30 cm without any adenopathy or ascites, there is trace pleural effusion.    Was Dx with DVT and was started on AC with resolution of her leg swelling.    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  Despite her aborted surgery in the past, I would recommend surgical extirpation.  GI work up may be indicated prior to surgery, although the markers are all normal.  She will require medical optimization/clearance, as well as preoperative IVC filter.    Spoke to patient via a relative, will try to have a more formal discussion about the proposed plan in the near future.    In total 50 min devoted in [facility] as well as review of the clinical/radiologic data.
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
r/o ovarian ca  please follow above recommendations
Patient examined and interviewed with family's assistance . History as above , reviewed GYN and GI consult . Large pelvic mass of unknown origin  subcutaneous nodules , bone mets ? Transvaginal sono not helpful . Bilateral DVT .   check tumor markers. possible endometrial biopsy , colonoscopy ? biopsy of subcutaneous nodule .
This patient has a high probability of sudden, clinically significant deterioration, which requires the highest level of physician preparedness to intervene urgently. I managed/supervised life or organ supporting interventions that required frequent physician assessment. I devoted my full attention in the ICU to the direct care of this patient for the period of time indicated below. Time I spent with the family or surrogate(s) is included only if the patient was incapable of providing the necessary information or participating in medical decision making. Time devoted to teaching and to any procedures I billed separately is not included.     Patient examined and evaluated at the bedside with SICU team. I performed a medically appropriate exam. Pertinent findings are detailed below. Vital signs, laboratory work, and imaging reviewed.     Neuro:  not reactive to external stimuli  #Sedation - start precedex when responding to external stimuli  #Postoperative pain - tylenol IV 1000mg Q6H PRN, dilaudid sliding scale 0.25/0.5mg Q3H    Respiratory: respirations even and unlabored on mechanical ventilation  CXR reviewed and interpreted by me - no acute cardiopulmonary process  Mode: AC/ CMV (Assist Control/ Continuous Mandatory Ventilation), RR (machine): 16, TV (machine): 450, FiO2: 40, PEEP: 5, ITime: 1, MAP: 8, PIP: 18  ABG - ( 12 Sep 2024 18:39 )  pH, Arterial: 7.28  pH, Blood: x     /  pCO2: 29    /  pO2: 165   / HCO3: 14    / Base Excess: -11.7 /  SaO2: 99.4    #Respiratory failure - wean vent settings as able, monitor ABG, SBT when able    CV: monitor hemodynamics, MAP goal > 65  #HTN - required pressors intra-op, hold anti-hypertensives unless SBP > 180, can use IV PRN antihypertensives, continue beta blocker marcelino-op    GI: abdominal dressing C/D/I  #Nutrition - NPO  #Ppx - Pepcid (mechanical ventilation)    Renal: Continue I&Os monitoring, replete electrolytes as needed  I/O - IN: 732 mL / OUT: 0 mL / NET: 732 mL  Fischer catheter - hourly I/O for critically ill patient  IVF - LR @ 100cc/h  #Hypomagnesemia - treated with magnesium sulfate 2g IV  #Metabolic acidosis - monitor bicarbonate, continue resuscitation with lactated ringer's, hyperventilate to compensate    Heme: continue to evaluate for acute blood loss anemia- trend Hg/Hct   #Acute blood loss anemia - trend hemoglobin, transfuse for hgb < 7.0, consider for <8.0 if hemodynamics unstable  #Lower extremity DVT - restart anticoagulation 24h post op per primary team             ID: trend WBC, monitor for fevers, per-iop cefotetan    Endocrine: Prevent and treat hyperglycemia with insulin as needed    PV: follow pulse exam    MSK: OOB and mobilize as able, PT eval when able    Skin: decub precautions    Lines: PIV, Fischer, arterial line, ETT, OGT  DVT Prophylaxis: holding per primary  Stress Ulcer Prophylaxis: Pepcid (mechanical ventilation)  Disposition: SICU - metabolic acidosis, mechanical ventilation, at risk for hemodynamic instability    Nathan Leonardo MD  Trauma/Acute Care Surgery/Surgical Critical Care Attending

## 2024-09-12 NOTE — CONSULT NOTE ADULT - SUBJECTIVE AND OBJECTIVE BOX
SKYLAR ORTIZ   [id number]   [birth year]    79yF  ============================================================   DATE OF INITIAL SICU/SDU CONSULT: 09-12-24    INDICATION FOR SICU CONSULT:       SICU COURSE EVENTS :  09-12 - admitted to SICU service     24HOUR EVENTS  -Admission under SICU service    [X] A ten-point review of systems was negative except as expressed in note.  [X] History was obtained from patient. If unable to participate in their care, history obtained from review of the chart and collateral sources of information.  ============================================================      HPI   HPI:   79-year-old female with past medical history of prediabetes presents for right groin pain with radiation down anterior leg and constipation x 2 days.  Patient has arrived  from [facility] 1 week ago and has been staying with family who have noticed decreased appetite and complaints of right groin pain worsening when walking and decreased bowel movements despite utilizing prune juice and increasing fluids. Tylenol occasionally improves pain.   Patient was seen by her primary Dr Jefferson  last night and directed to the emergency department.  Patient's daughter Queta notes patient underwent a recent surgery to her right colon and [facility], unsure what was done or what was removed but is concerned that her constipation and new pain could be related to the surgery   Denies any  fevers chills nausea vomiting diarrhea no numbness/weakness    off note:As per primary Dr Jefferson, reviewed records from [facility], pt was found to have metastatic cancer, possibly colon as primary. Was informed needed to start chemo, however there was no chemo available there at the time.      In Ed on vitals   · BP Systolic	 174 mm Hg  · BP Diastolic	83 mm Hg  · Heart Rate	68 /min  afebrile on RA     On labs Hb 10.2 , neutrophil % 90.3 , Na 130 , potassium 5.2 hemolysed   On imaging  On  CT A/P :   Large multiloculated cystic mass with areas of soft tissue, measuring   24.7 x 12.5 x 22.3 cm, extending from the pelvis into the upper abdomen.    Findings are concerning for malignancy; felt to likely be gynecologic in   etiology, but this mass remains indeterminate.    Questionable filling defects in both the right and left common femoral   vein and deep venous thrombosis is not excluded.      Mild bilateral hydronephrosis    Indeterminate multiple fluid-filled/cystic lesions within the abdomen   measuring up to 6.5 cm, felt to likely be related to cystic metastatic   disease    Soft tissue density within the transverse colon adjacent to the splenic   flexure on image 52 series 2. Findings may be related to focal stool.   Underlying neoplasm cannot be excluded.    Subcutaneous nodule measuring 1.8 cm and subcentimeter sclerotic focus in   T12, metastatic disease is not excluded    Small right pleural effusion.    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    As per Ed the prelim Duplex BLE report showed  DVTs from gastroc/popliteal/peroneal. Not including femoral        admitted for further workup (04 Sep 2024 20:27)      Pertinent Imaging    OR Stats for 09-12-24    OR Time: 4h 45m  IV Fluids: 5L   EBL: 500mL  Blood Products: 2u PRBC  UOP: 100mL  Findings - PROCEDURES: Exploratory laparotomy by gynecology, JUSTA & BSO, Omentectomy, Small bowel resection and reanastamosis, transverse colon resection and reanastamosis, Appendectomy, Debulking of pelvic tumor.     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 Spillage upon dissection. Ureters identified bilaterally. Normal small uterus. Normal appearing left adnexa. Slightly abnormal appendix, removed. Three large tumors along mesentry of small bowel, Resection and reanastomosis. Hard mass appreciated in transverse colon, Resection and reanastomosis by Dr Morris. Tumor debulking from bowel serosa, umbilical peritoneum, perihepatic peritoneum on right, posterior cul de sac.    PAST MEDICAL & SURGICAL HISTORY      HOME MEDICATIONS    amiodarone 200 mg oral tablet: 1 tab(s) orally  bisoprolol: 2.5 milligram(s)  Diclofenac:   ferrous sulfate:   furosemide 40 mg oral tablet: 1 tab(s) orally  spironolactone 25 mg oral tablet: 1 tab(s) orally    ACTIVE MEDICATIONS    cefoTEtan  IVPB 2 Gram(s) IV Intermittent <User Schedule>  chlorhexidine 0.12% Liquid 15 milliLiter(s) Oral Mucosa every 12 hours  chlorhexidine 2% Cloths 1 Application(s) Topical <User Schedule>  dexMEDEtomidine Infusion 0.2 MICROgram(s)/kG/Hr IV Continuous <Continuous>  HYDROmorphone  Injectable 0.25 milliGRAM(s) IV Push every 3 hours PRN Moderate Pain (4 - 6)  HYDROmorphone  Injectable 0.5 milliGRAM(s) IV Push every 3 hours PRN Severe Pain (7 - 10)  insulin lispro (ADMELOG) corrective regimen sliding scale   SubCutaneous every 6 hours  lactated ringers. 1000 milliLiter(s) IV Continuous <Continuous>  magnesium sulfate  IVPB 2 Gram(s) IV Intermittent every 2 hours  metoprolol tartrate Injectable 5 milliGRAM(s) IV Push every 6 hours    ALLERGIES  Allergies    No Known Allergies    Intolerances         VITAL SIGNS (Last 24Hours)  ICU Vital Signs Last 24 Hrs  T(C): 36.6 (12 Sep 2024 14:45), Max: 36.9 (11 Sep 2024 17:47)  T(F): 97.9 (12 Sep 2024 14:45), Max: 98.5 (11 Sep 2024 17:47)  HR: 66 (12 Sep 2024 14:45) (60 - 67)  BP: 130/65 (12 Sep 2024 14:45) (114/60 - 166/73)  BP(mean): 83 (12 Sep 2024 14:45) (79 - 104)  ABP: 110/65 (12 Sep 2024 14:45) (110/65 - 146/59)  ABP(mean): 79 (12 Sep 2024 14:45) (79 - 89)  RR: 16 (12 Sep 2024 14:45) (16 - 19)  SpO2: 100% (12 Sep 2024 14:45) (95% - 100%)    O2 Parameters below as of 12 Sep 2024 14:45  Patient On (Oxygen Delivery Method): ventilator    O2 Concentration (%): 40        INTAKE/OUTPUT (Last 24Hours)  I&O's Summary    11 Sep 2024 07:01  -  12 Sep 2024 07:00  --------------------------------------------------------  IN: 732 mL / OUT: 0 mL / NET: 732 mL        LABS (Last 24Hours)  [09-12 @ 14:12:] Na 134 K 3.9 Cl 105 Mg 1.5 b>Phos 4.3 BUN/Cr 12/0.9>>>   [09-11 @ 06:17:] Na 134 K 4.4 Cl 99 Mg 1.9 b>Phos ___ BUN/Cr 14/1.1>>>     [09-12 @ 14:12] AST 31  ALT 15 DBili __  TBili 0.7 Alb 2.4  [09-09 @ 07:24] AST 15  ALT 8 DBili __  TBili 0.3 Alb 2.8      MECHANICAL VENT/BLOOD GAS  if indicated   Mode: AC/ CMV (Assist Control/ Continuous Mandatory Ventilation), RR (machine): 16, TV (machine): 450, FiO2: 50, PEEP: 5, MAP: 9  [09-12 @ 14:07] pH 7.30  CO2 33 [id number]   Art Sat99.4 Lac1.8 IonizedCal 1.13       PHYSICAL EXAM   PHYSICAL EXAM: Pt was not reversed after OR    General: Pt lying comfortably in bed.     Neuro:  GCS:     = E   / V   / M      Neuro: Alert & oriented x 3, no focal deficits. CNs II-XII intact. PERRLA, EOMs intact. Strength 5/5 throughout, sensory to light touch intact.     Lungs: Clear to auscultation bilaterally, normal expansion/effort. ETT 22cm at lip line. Vent: Mode: AC/ CMV (Assist Control/ Continuous Mandatory Ventilation), RR (machine): 16, TV (machine): 450, FiO2: 40, PEEP: 5, ITime: 1, MAP: 9, PIP: 23    Cardiovascular: S1, S2. Regular rate and rhythm. Cardiac Rhythm: NSR  Peripheral edema: none noted    GI: Abdomen soft, Non-tender, Non-distended. Orogastric tube in place at 45cm    Wound: Midline incision c/d/i    Extremities: Extremities warm, pink, well-perfused.        - Pulse exam: DP/PT palpable bilaterally.     Derm: Good skin turgor, no skin breakdown.       - DTI: pending    : Fischer catheter in place    ----------------------------------------------------------------------------------------------------------  Tubes/Lines/Drains    [x] Peripheral IV    [x] Urinary Catheter Fischer   Insertion Date: 9/12   [x] Arterial Line      [x]Radial    [ ]Femoral     [ ]Axillary         [x]Left  [ ]Right      Insertion Date:            SKYLAR ORTIZ   [id number]   [birth year]    79yF  ============================================================   DATE OF INITIAL SICU/SDU CONSULT: 09-12-24    INDICATION FOR SICU CONSULT:       SICU COURSE EVENTS :  09-12 - admitted to SICU service     24HOUR EVENTS  -Admission under SICU service    [X] A ten-point review of systems was negative except as expressed in note.  [X] History was obtained from patient. If unable to participate in their care, history obtained from review of the chart and collateral sources of information.  ============================================================      HPI   HPI:   79-year-old female with past medical history of prediabetes presents for right groin pain with radiation down anterior leg and constipation x 2 days.  Patient has arrived  from [facility] 1 week ago and has been staying with family who have noticed decreased appetite and complaints of right groin pain worsening when walking and decreased bowel movements despite utilizing prune juice and increasing fluids. Tylenol occasionally improves pain.   Patient was seen by her primary Dr Jefferson  last night and directed to the emergency department.  Patient's daughter Queta notes patient underwent a recent surgery to her right colon and [facility], unsure what was done or what was removed but is concerned that her constipation and new pain could be related to the surgery   Denies any  fevers chills nausea vomiting diarrhea no numbness/weakness    off note: As per primary Dr Jefferson, reviewed records from [facility], pt was found to have metastatic cancer, possibly colon as primary. Was informed needed to start chemo, however there was no chemo available there at the time.      In Ed on vitals   · BP Systolic	 174 mm Hg  · BP Diastolic	83 mm Hg  · Heart Rate	68 /min  afebrile on RA     On labs Hb 10.2 , neutrophil % 90.3 , Na 130 , potassium 5.2 hemolysed   On imaging  On  CT A/P :   Large multiloculated cystic mass with areas of soft tissue, measuring   24.7 x 12.5 x 22.3 cm, extending from the pelvis into the upper abdomen.    Findings are concerning for malignancy; felt to likely be gynecologic in   etiology, but this mass remains indeterminate.    Questionable filling defects in both the right and left common femoral   vein and deep venous thrombosis is not excluded.      Mild bilateral hydronephrosis    Indeterminate multiple fluid-filled/cystic lesions within the abdomen   measuring up to 6.5 cm, felt to likely be related to cystic metastatic   disease    Soft tissue density within the transverse colon adjacent to the splenic   flexure on image 52 series 2. Findings may be related to focal stool.   Underlying neoplasm cannot be excluded.    Subcutaneous nodule measuring 1.8 cm and subcentimeter sclerotic focus in   T12, metastatic disease is not excluded    Small right pleural effusion.    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    As per Ed the prelim Duplex BLE report showed  DVTs from gastroc/popliteal/peroneal. Not including femoral        admitted for further workup (04 Sep 2024 20:27)      Pertinent Imaging    OR Stats for 09-12-24    OR Time: 4h 45m  IV Fluids: 5L   EBL: 500mL  Blood Products: 2u PRBC  UOP: 100mL  Findings - PROCEDURES: Exploratory laparotomy by gynecology, JUSTA & BSO, Omentectomy, Small bowel resection and reanastamosis, transverse colon resection and reanastamosis, Appendectomy, Debulking of pelvic tumor.     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 Spillage upon dissection. Ureters identified bilaterally. Normal small uterus. Normal appearing left adnexa. Slightly abnormal appendix, removed. Three large tumors along mesentry of small bowel, Resection and reanastomosis. Hard mass appreciated in transverse colon, Resection and reanastomosis by Dr Morris. Tumor debulking from bowel serosa, umbilical peritoneum, perihepatic peritoneum on right, posterior cul de sac.    PAST MEDICAL & SURGICAL HISTORY      HOME MEDICATIONS    amiodarone 200 mg oral tablet: 1 tab(s) orally  bisoprolol: 2.5 milligram(s)  Diclofenac:   ferrous sulfate:   furosemide 40 mg oral tablet: 1 tab(s) orally  spironolactone 25 mg oral tablet: 1 tab(s) orally    ACTIVE MEDICATIONS    cefoTEtan  IVPB 2 Gram(s) IV Intermittent <User Schedule>  chlorhexidine 0.12% Liquid 15 milliLiter(s) Oral Mucosa every 12 hours  chlorhexidine 2% Cloths 1 Application(s) Topical <User Schedule>  dexMEDEtomidine Infusion 0.2 MICROgram(s)/kG/Hr IV Continuous <Continuous>  HYDROmorphone  Injectable 0.25 milliGRAM(s) IV Push every 3 hours PRN Moderate Pain (4 - 6)  HYDROmorphone  Injectable 0.5 milliGRAM(s) IV Push every 3 hours PRN Severe Pain (7 - 10)  insulin lispro (ADMELOG) corrective regimen sliding scale   SubCutaneous every 6 hours  lactated ringers. 1000 milliLiter(s) IV Continuous <Continuous>  magnesium sulfate  IVPB 2 Gram(s) IV Intermittent every 2 hours  metoprolol tartrate Injectable 5 milliGRAM(s) IV Push every 6 hours    ALLERGIES  Allergies    No Known Allergies    Intolerances         VITAL SIGNS (Last 24Hours)  ICU Vital Signs Last 24 Hrs  T(C): 36.6 (12 Sep 2024 14:45), Max: 36.9 (11 Sep 2024 17:47)  T(F): 97.9 (12 Sep 2024 14:45), Max: 98.5 (11 Sep 2024 17:47)  HR: 66 (12 Sep 2024 14:45) (60 - 67)  BP: 130/65 (12 Sep 2024 14:45) (114/60 - 166/73)  BP(mean): 83 (12 Sep 2024 14:45) (79 - 104)  ABP: 110/65 (12 Sep 2024 14:45) (110/65 - 146/59)  ABP(mean): 79 (12 Sep 2024 14:45) (79 - 89)  RR: 16 (12 Sep 2024 14:45) (16 - 19)  SpO2: 100% (12 Sep 2024 14:45) (95% - 100%)    O2 Parameters below as of 12 Sep 2024 14:45  Patient On (Oxygen Delivery Method): ventilator    O2 Concentration (%): 40        INTAKE/OUTPUT (Last 24Hours)  I&O's Summary    11 Sep 2024 07:01  -  12 Sep 2024 07:00  --------------------------------------------------------  IN: 732 mL / OUT: 0 mL / NET: 732 mL        LABS (Last 24Hours)  [09-12 @ 14:12:] Na 134 K 3.9 Cl 105 Mg 1.5 b>Phos 4.3 BUN/Cr 12/0.9>>>   [09-11 @ 06:17:] Na 134 K 4.4 Cl 99 Mg 1.9 b>Phos ___ BUN/Cr 14/1.1>>>     [09-12 @ 14:12] AST 31  ALT 15 DBili __  TBili 0.7 Alb 2.4  [09-09 @ 07:24] AST 15  ALT 8 DBili __  TBili 0.3 Alb 2.8      MECHANICAL VENT/BLOOD GAS  if indicated   Mode: AC/ CMV (Assist Control/ Continuous Mandatory Ventilation), RR (machine): 16, TV (machine): 450, FiO2: 50, PEEP: 5, MAP: 9  [09-12 @ 14:07] pH 7.30  CO2 33 [id number]   Art Sat99.4 Lac1.8 IonizedCal 1.13       PHYSICAL EXAM   PHYSICAL EXAM: Pt was not reversed after OR    General: Pt lying comfortably in bed.     Neuro: Alert & oriented x 3, no focal deficits. CNs II-XII intact. PERRLA, EOMs intact. Strength 5/5 throughout, sensory to light touch intact.     Lungs: Clear to auscultation bilaterally, normal expansion/effort. ETT 22cm at lip line. Vent: Mode: AC/ CMV (Assist Control/ Continuous Mandatory Ventilation), RR (machine): 16, TV (machine): 450, FiO2: 40, PEEP: 5, ITime: 1, MAP: 9, PIP: 23    Cardiovascular: S1, S2. Regular rate and rhythm. Cardiac Rhythm: NSR  Peripheral edema: none noted    GI: Abdomen soft, Non-tender, Non-distended. Orogastric tube in place at 45cm    Wound: Midline incision c/d/i    Extremities: Extremities warm, pink, well-perfused.        - Pulse exam: DP/PT palpable bilaterally.     Derm: Good skin turgor, no skin breakdown.       - DTI: pending    : Fischer catheter in place    ----------------------------------------------------------------------------------------------------------  Tubes/Lines/Drains    [x] Peripheral IV    [x] Urinary Catheter Fischer   Insertion Date: 9/12   [x] Arterial Line      [x]Radial    [ ]Femoral     [ ]Axillary         [x]Left  [ ]Right      Insertion Date:            SKYLAR ORTIZ   [id number]   [birth year]    79yF  ============================================================   DATE OF INITIAL SICU CONSULT: 09-12-24    INDICATION FOR SICU CONSULT: remained intubated postoperatively, intraoperative vasopressor support     SICU COURSE EVENTS :  09-12 - admitted to SICU service     24HOUR EVENTS  - Admission under SICU service    [X] A ten-point review of systems was negative except as expressed in note.  [X] History was obtained from patient. If unable to participate in their care, history obtained from review of the chart and collateral sources of information.  ============================================================      HPI   HPI:   79-year-old female with unclear PMHx presented to ED on 9/4/24 for right groin pain with radiation down anterior leg and constipation x 2 days. Patient arrived from [facility] 1 week prior, family noticed decreased appetite and her complaints of right groin pain worsening when walking and decreased bowel movements, despite utilizing prune juice and increasing fluids. Tylenol occasionally improves pain. Patient was seen by her primary Dr Jefferson last night and directed to the emergency department.  Patient's daughter Queta notes patient underwent a recent surgery to her right colon and [facility], unsure what was done or what was removed but is concerned that her constipation and new pain could be related to the surgery.  Denies any  fevers chills nausea vomiting diarrhea no numbness/weakness    of note: As per primary Dr Jefferson, reviewed records from [facility], pt was found to have metastatic cancer, possibly colon as primary. Was informed needed to start chemo, however there was no chemo available there at the time.      In Ed on vitals   · BP Systolic	 174 mm Hg  · BP Diastolic	83 mm Hg  · Heart Rate	68 /min  afebrile on RA     On labs Hb 10.2 , neutrophil % 90.3 , Na 130 , potassium 5.2 hemolysed   On imaging  On  CT A/P :   Large multiloculated cystic mass with areas of soft tissue, measuring   24.7 x 12.5 x 22.3 cm, extending from the pelvis into the upper abdomen.    Findings are concerning for malignancy; felt to likely be gynecologic in   etiology, but this mass remains indeterminate.    Questionable filling defects in both the right and left common femoral   vein and deep venous thrombosis is not excluded.      Mild bilateral hydronephrosis    Indeterminate multiple fluid-filled/cystic lesions within the abdomen   measuring up to 6.5 cm, felt to likely be related to cystic metastatic   disease    Soft tissue density within the transverse colon adjacent to the splenic   flexure on image 52 series 2. Findings may be related to focal stool.   Underlying neoplasm cannot be excluded.    Subcutaneous nodule measuring 1.8 cm and subcentimeter sclerotic focus in   T12, metastatic disease is not excluded    Small right pleural effusion.    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    As per Ed the prelim Duplex BLE report showed  DVTs from gastroc/popliteal/peroneal. Not including femoral        admitted for further workup (04 Sep 2024 20:27)      Pertinent Imaging    OR Stats for 09-12-24    OR Time: 4h 45m  IV Fluids: 5L   EBL: 500mL  Blood Products: 2u PRBC  UOP: 100mL  Findings - PROCEDURES: Exploratory laparotomy by gynecology, JUSTA & BSO, Omentectomy, Small bowel resection and reanastamosis, transverse colon resection and reanastamosis, Appendectomy, Debulking of pelvic tumor.     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 Spillage upon dissection. Ureters identified bilaterally. Normal small uterus. Normal appearing left adnexa. Slightly abnormal appendix, removed. Three large tumors along mesentry of small bowel, Resection and reanastomosis. Hard mass appreciated in transverse colon, Resection and reanastomosis by Dr Morris. Tumor debulking from bowel serosa, umbilical peritoneum, perihepatic peritoneum on right, posterior cul de sac.    PAST MEDICAL & SURGICAL HISTORY      HOME MEDICATIONS    amiodarone 200 mg oral tablet: 1 tab(s) orally  bisoprolol: 2.5 milligram(s)  Diclofenac:   ferrous sulfate:   furosemide 40 mg oral tablet: 1 tab(s) orally  spironolactone 25 mg oral tablet: 1 tab(s) orally    ACTIVE MEDICATIONS    cefoTEtan  IVPB 2 Gram(s) IV Intermittent <User Schedule>  chlorhexidine 0.12% Liquid 15 milliLiter(s) Oral Mucosa every 12 hours  chlorhexidine 2% Cloths 1 Application(s) Topical <User Schedule>  dexMEDEtomidine Infusion 0.2 MICROgram(s)/kG/Hr IV Continuous <Continuous>  HYDROmorphone  Injectable 0.25 milliGRAM(s) IV Push every 3 hours PRN Moderate Pain (4 - 6)  HYDROmorphone  Injectable 0.5 milliGRAM(s) IV Push every 3 hours PRN Severe Pain (7 - 10)  insulin lispro (ADMELOG) corrective regimen sliding scale   SubCutaneous every 6 hours  lactated ringers. 1000 milliLiter(s) IV Continuous <Continuous>  magnesium sulfate  IVPB 2 Gram(s) IV Intermittent every 2 hours  metoprolol tartrate Injectable 5 milliGRAM(s) IV Push every 6 hours    ALLERGIES  Allergies    No Known Allergies    Intolerances         VITAL SIGNS (Last 24Hours)  ICU Vital Signs Last 24 Hrs  T(C): 36.6 (12 Sep 2024 14:45), Max: 36.9 (11 Sep 2024 17:47)  T(F): 97.9 (12 Sep 2024 14:45), Max: 98.5 (11 Sep 2024 17:47)  HR: 66 (12 Sep 2024 14:45) (60 - 67)  BP: 130/65 (12 Sep 2024 14:45) (114/60 - 166/73)  BP(mean): 83 (12 Sep 2024 14:45) (79 - 104)  ABP: 110/65 (12 Sep 2024 14:45) (110/65 - 146/59)  ABP(mean): 79 (12 Sep 2024 14:45) (79 - 89)  RR: 16 (12 Sep 2024 14:45) (16 - 19)  SpO2: 100% (12 Sep 2024 14:45) (95% - 100%)    O2 Parameters below as of 12 Sep 2024 14:45  Patient On (Oxygen Delivery Method): ventilator    O2 Concentration (%): 40        INTAKE/OUTPUT (Last 24Hours)  I&O's Summary    11 Sep 2024 07:01  -  12 Sep 2024 07:00  --------------------------------------------------------  IN: 732 mL / OUT: 0 mL / NET: 732 mL        LABS (Last 24Hours)  [09-12 @ 14:12:] Na 134 K 3.9 Cl 105 Mg 1.5 b>Phos 4.3 BUN/Cr 12/0.9>>>   [09-11 @ 06:17:] Na 134 K 4.4 Cl 99 Mg 1.9 b>Phos ___ BUN/Cr 14/1.1>>>     [09-12 @ 14:12] AST 31  ALT 15 DBili __  TBili 0.7 Alb 2.4  [09-09 @ 07:24] AST 15  ALT 8 DBili __  TBili 0.3 Alb 2.8      MECHANICAL VENT/BLOOD GAS  if indicated   Mode: AC/ CMV (Assist Control/ Continuous Mandatory Ventilation), RR (machine): 16, TV (machine): 450, FiO2: 50, PEEP: 5, MAP: 9  [09-12 @ 14:07] pH 7.30  CO2 33 [id number]   Art Sat99.4 Lac1.8 IonizedCal 1.13       PHYSICAL EXAM   PHYSICAL EXAM: Pt was not reversed after OR    General: Pt lying comfortably in bed.     Neuro: Alert & oriented x 3, no focal deficits. CNs II-XII intact. PERRLA, EOMs intact. Strength 5/5 throughout, sensory to light touch intact.     Lungs: Clear to auscultation bilaterally, normal expansion/effort. ETT 22cm at lip line. Vent: Mode: AC/ CMV (Assist Control/ Continuous Mandatory Ventilation), RR (machine): 16, TV (machine): 450, FiO2: 40, PEEP: 5, ITime: 1, MAP: 9, PIP: 23    Cardiovascular: S1, S2. Regular rate and rhythm. Cardiac Rhythm: NSR  Peripheral edema: none noted    GI: Abdomen soft, Non-tender, Non-distended. Orogastric tube in place at 45cm    Wound: Midline incision c/d/i    Extremities: Extremities warm, pink, well-perfused.        - Pulse exam: DP/PT palpable bilaterally.     Derm: Good skin turgor, no skin breakdown.       - DTI: pending    : Fischer catheter in place    ----------------------------------------------------------------------------------------------------------  Tubes/Lines/Drains    [x] Peripheral IV    [x] Urinary Catheter Fischer   Insertion Date: 9/12   [x] Arterial Line      [x]Radial    [ ]Femoral     [ ]Axillary         [x]Left  [ ]Right      Insertion Date:            ANGEL AMERICOZE   [id number]   [birth year]    79yF  ============================================================   DATE OF INITIAL SICU CONSULT: 09-12-24    INDICATION FOR SICU CONSULT: remained intubated postoperatively, intraoperative vasopressor support     SICU COURSE EVENTS :  09-12 - admitted to SICU service     24HOUR EVENTS  - Admission under SICU service    [X] A ten-point review of systems was negative except as expressed in note.  [X] History was obtained from patient. If unable to participate in their care, history obtained from review of the chart and collateral sources of information.  ============================================================      HPI   79-year-old female with unclear PMHx presented to ED on 9/4/24 for right groin pain with radiation down anterior leg and constipation x 2 days. Patient arrived from [facility] 1 week prior, family noticed decreased appetite and her complaints of right groin pain worsened when walking and decreased bowel movements, despite utilizing prune juice and increasing fluids. Tylenol occasionally improves pain. Patient was seen by her primary Dr. Jefferson last night and directed to the emergency department. Patient's daughter Queta notes patient underwent a recent surgery to her right colon and [facility], unsure what was done or what was removed but is concerned that her constipation and new pain could be related to the surgery. As per primary Dr. Jefferson, pt was diagnosed with metastatic cancer in [facility], possibly colon as primary. Was informed needed to start chemo, however there was no chemo available there at the time.    In the ED, pt was hypertensive to SBP to 170s, afebrile. CT A/P showed 25cm pelvic mass, multiple cystic lesions, and colonic mass concerning for malignancy, bilateral common femoral vein filling defects, bilateral mild hydronephrosis, and incidental findings of ascending aortic aneurysm, calcified splenic artery aneurysm, and small right pleural effusion. Bilateral lower extremity duplex showed Right lower extremity DVT in peroneal vein and gastrocnemius veins and Left lower extremity DVT in gastrocnemius, posterior tibial, and peroneal veins. Pt was admitted to medicine for further workup, see specialty consults below.     Consults:   Interventional radiology: Consulted for biopsy of pelvic mass, recommended against percutaneous biopsy due to possible spilling of neoplastic fluid into abdomen. Additionally consulted for and recommended preoperative IVC filter for bilateral DVTs.  Gynecological oncology: consulted for pelvic mass, recommended TVUS, tumor markers. Recommended resection of pelvic mass. Surgical oncology consulted for assistance of pelvic mass resection.   Gastroenterology: outpatient colonoscopy for colonic mass seen on CT  Vascular: Consulted for r/o DVTs. Bilateral lower extremity duplex showed Right lower extremity DVT in peroneal vein and gastrocnemius veins and Left lower extremity DVT in gastrocnemius, posterior tibial, and peroneal veins; recommend AC for 3 months and outpatient follow-up.     Pt underwent an Exploratory laparotomy, JUSTA & BSO, Omentectomy, Small bowel resection and reanastomosis, transverse colon resection and reanastomosis Appendectomy, and Debulking of pelvic tumor on 9/12 with gynecological oncology and surgical oncology. See OR stats below. Case complicated by intraoperative hypotension requiring vasopressor support and metabolic acidosis. SICU consulted for hemodynamic monitoring and management of mechanical ventilation, approved for SICU by Dr. Leonardo.     Pertinent Imaging  FINDINGS:    CHEST:    LUNGS/PLEURA/AIRWAYS: The central tracheobronchial tree is patent. There   is a small right pleural effusion with atelectasis..    MEDIASTINUM/THORACIC NODES: The thyroid gland is present. There is no   evidence of axillary, mediastinal or hilar lymphadenopathy..    HEART/GREAT VESSELS: There are vascular calcifications. The ascending   aorta is aneurysmal measuring 4.5 cm. There is no pericardial effusion..      ABDOMEN/PELVIS:    HEPATOBILIARY: Unremarkable...    SPLEEN: There is a calcified splenic artery aneurysm adjacent to the left   adrenal gland measuring 1.6 cm.    PANCREAS: Atrophy.    ADRENAL GLANDS: Unremarkable..    KIDNEYS: There is mild bilateral hydroureteronephrosis..    ABDOMINOPELVIC NODES: Unremarkable...    PELVIC ORGANS: There is a large multiloculated cystic mass with areas of   soft tissue, measuring 24.7 x 12.5 x 22.3 cm, extending into the upper   abdomen. Findings are concerning for malignancy; felt to likely be   gynecologic in etiology, but this mass remains indeterminate. Within the   upper abdomen and left upper quadrant, there are multiple   fluid-filled/cystic lesions measuring up to 6.5 cm, felt to likely be   related to cystic metastatic disease    PERITONEUM/MESENTERY/BOWEL: There is no free air or obstruction. There is   an area of soft tissue density within the transverse colon adjacent to   the splenic flexure on image 52 series 2. Findings may be related to   focal stool. However, underlying neoplasm cannot be excluded..    BONES/SOFT TISSUES: Within the subcutaneous tissues of the anterior left   pelvic wall, there is a 1.8 x 1.0 cm nodule in image 109 of series 2 and   a metastatic focus is not excluded. Degenerative change. There is a T12   sclerotic focus measuring 0.8 cm, possibly representing a bone island. A   blastic metastasis is not excluded...    OTHER: Vascular calcifications. There are questionable filling defects in   both the right and left common femoral vein and deep venous thrombosis is   not excluded. Further evaluation with ultrasound is recommended...    IMPRESSION:    Large multiloculated cystic mass with areas of soft tissue, measuring   24.7 x 12.5 x 22.3 cm, extending from the pelvis into the upper abdomen.    Findings are concerning for malignancy; felt to likely be gynecologic in   etiology, but this mass remains indeterminate.    Questionable filling defects in both the right and left common femoral   vein and deep venous thrombosis is not excluded.    Further evaluation with ultrasound is recommended.    Mild bilateral hydronephrosis    Indeterminate multiple fluid-filled/cystic lesions within the abdomen   measuring up to 6.5 cm, felt to likely be related to cystic metastatic   disease    Soft tissue density within the transverse colon adjacent to the splenic   flexure on image 52 series 2. Findings may be related to focal stool.   Underlying neoplasm cannot be excluded.    Subcutaneous nodule measuring 1.8 cm and subcentimeter sclerotic focus in   T12, metastatic disease is not excluded    Small rightpleural effusion.    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    US Transvaginal (09.06.24 @ 14:46) >  Large heterogeneous mass with measuring 22.6 x 16.9 x 20.3 cm within the pelvic cavity. The mass obscures visualization of the uterus and ovaries.     VA Duplex Lower Ext Vein Scan, Bilat (09.04.24 @ 19:09) >  Right lower extremity DVT in peroneal vein and gastrocnemius veins. Left lower extremity DVT in gastrocnemius, posterior tibial, and peroneal   veins.    OR Stats for 09-12-24    OR Time: 4h 45m  IV Fluids: 5L   EBL: 500mL  Blood Products: 2u PRBC  UOP: 100mL  Findings - PROCEDURES: Exploratory laparotomy by gynecology, JUSTA & BSO, Omentectomy, Small bowel resection and reanastamosis, transverse colon resection and reanastamosis, Appendectomy, Debulking of pelvic tumor.     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 Spillage upon dissection. Ureters identified bilaterally. Normal small uterus. Normal appearing left adnexa. Slightly abnormal appendix, removed. Three large tumors along mesentry of small bowel, Resection and reanastomosis. Hard mass appreciated in transverse colon, Resection and reanastomosis by Dr Morris. Tumor debulking from bowel serosa, umbilical peritoneum, perihepatic peritoneum on right, posterior cul de sac.    PAST MEDICAL & SURGICAL HISTORY      HOME MEDICATIONS    amiodarone 200 mg oral tablet: 1 tab(s) orally  bisoprolol: 2.5 milligram(s)  Diclofenac:   ferrous sulfate:   furosemide 40 mg oral tablet: 1 tab(s) orally  spironolactone 25 mg oral tablet: 1 tab(s) orally    ACTIVE MEDICATIONS    cefoTEtan  IVPB 2 Gram(s) IV Intermittent <User Schedule>  chlorhexidine 0.12% Liquid 15 milliLiter(s) Oral Mucosa every 12 hours  chlorhexidine 2% Cloths 1 Application(s) Topical <User Schedule>  dexMEDEtomidine Infusion 0.2 MICROgram(s)/kG/Hr IV Continuous <Continuous>  HYDROmorphone  Injectable 0.25 milliGRAM(s) IV Push every 3 hours PRN Moderate Pain (4 - 6)  HYDROmorphone  Injectable 0.5 milliGRAM(s) IV Push every 3 hours PRN Severe Pain (7 - 10)  insulin lispro (ADMELOG) corrective regimen sliding scale   SubCutaneous every 6 hours  lactated ringers. 1000 milliLiter(s) IV Continuous <Continuous>  magnesium sulfate  IVPB 2 Gram(s) IV Intermittent every 2 hours  metoprolol tartrate Injectable 5 milliGRAM(s) IV Push every 6 hours    ALLERGIES  Allergies    No Known Allergies    Intolerances         VITAL SIGNS (Last 24Hours)  ICU Vital Signs Last 24 Hrs  T(C): 36.6 (12 Sep 2024 14:45), Max: 36.9 (11 Sep 2024 17:47)  T(F): 97.9 (12 Sep 2024 14:45), Max: 98.5 (11 Sep 2024 17:47)  HR: 66 (12 Sep 2024 14:45) (60 - 67)  BP: 130/65 (12 Sep 2024 14:45) (114/60 - 166/73)  BP(mean): 83 (12 Sep 2024 14:45) (79 - 104)  ABP: 110/65 (12 Sep 2024 14:45) (110/65 - 146/59)  ABP(mean): 79 (12 Sep 2024 14:45) (79 - 89)  RR: 16 (12 Sep 2024 14:45) (16 - 19)  SpO2: 100% (12 Sep 2024 14:45) (95% - 100%)    O2 Parameters below as of 12 Sep 2024 14:45  Patient On (Oxygen Delivery Method): ventilator    O2 Concentration (%): 40        INTAKE/OUTPUT (Last 24Hours)  I&O's Summary    11 Sep 2024 07:01  -  12 Sep 2024 07:00  --------------------------------------------------------  IN: 732 mL / OUT: 0 mL / NET: 732 mL        LABS (Last 24Hours)  [09-12 @ 14:12:] Na 134 K 3.9 Cl 105 Mg 1.5 b>Phos 4.3 BUN/Cr 12/0.9>>>   [09-11 @ 06:17:] Na 134 K 4.4 Cl 99 Mg 1.9 b>Phos ___ BUN/Cr 14/1.1>>>     [09-12 @ 14:12] AST 31  ALT 15 DBili __  TBili 0.7 Alb 2.4  [09-09 @ 07:24] AST 15  ALT 8 DBili __  TBili 0.3 Alb 2.8      MECHANICAL VENT/BLOOD GAS  if indicated   Mode: AC/ CMV (Assist Control/ Continuous Mandatory Ventilation), RR (machine): 16, TV (machine): 450, FiO2: 50, PEEP: 5, MAP: 9  [09-12 @ 14:07] pH 7.30  CO2 33 [id number]   Art Sat99.4 Lac1.8 IonizedCal 1.13       PHYSICAL EXAM   PHYSICAL EXAM: Pt was not reversed after OR    General: Pt lying comfortably in bed.     Neuro: Alert & oriented x 3, no focal deficits. CNs II-XII intact. PERRLA, EOMs intact. Strength 5/5 throughout, sensory to light touch intact.     Lungs: Clear to auscultation bilaterally, normal expansion/effort. ETT 22cm at lip line. Vent: Mode: AC/ CMV (Assist Control/ Continuous Mandatory Ventilation), RR (machine): 16, TV (machine): 450, FiO2: 40, PEEP: 5, ITime: 1, MAP: 9, PIP: 23    Cardiovascular: S1, S2. Regular rate and rhythm. Cardiac Rhythm: NSR  Peripheral edema: none noted    GI: Abdomen soft, Non-tender, Non-distended. Orogastric tube in place at 45cm    Wound: Midline incision c/d/i    Extremities: Extremities warm, pink, well-perfused.        - Pulse exam: DP/PT palpable bilaterally.     Derm: Good skin turgor, no skin breakdown.       - DTI: pending    : Fischer catheter in place    ----------------------------------------------------------------------------------------------------------  Tubes/Lines/Drains    [x] Peripheral IV    [x] Urinary Catheter Fischer   Insertion Date: 9/12   [x] Arterial Line      [x]Radial    [ ]Femoral     [ ]Axillary         [x]Left  [ ]Right      Insertion Date:

## 2024-09-12 NOTE — BRIEF OPERATIVE NOTE - NSICDXBRIEFPOSTOP_GEN_ALL_CORE_FT
POST-OP DIAGNOSIS:  Pelvic mass 12-Sep-2024 14:10:23  Roxi Waters  Colonic mass 12-Sep-2024 14:10:31  Roxi Waters

## 2024-09-12 NOTE — PROGRESS NOTE ADULT - SUBJECTIVE AND OBJECTIVE BOX
PGY4 Note    HPI: Pt seen at bedside, resting comfortably. She is going to the OR today for ExLap, JUSTA BSO, with GynOnc and SurgOnc assistance. She is s/p 1L Golytely + timed Flagyl and Neomycin for bowel prep. She has been NPO since midnight with IVF @150cc/hr. She received IVC filter yesterday with IR, Heparin drip was discontinued yesterday at 1900.   ROS: Denies cardiovascular or respiratory symptoms    PAST MEDICAL & SURGICAL HISTORY:      Physical Exam  Vital Signs Last 24 Hrs  T(F): 98 (12 Sep 2024 04:57), Max: 98.5 (11 Sep 2024 17:47)  HR: 60 (12 Sep 2024 04:57) (59 - 64)  BP: 166/73 (12 Sep 2024 04:57) (160/64 - 182/78)  RR: 18 (12 Sep 2024 04:57) (18 - 18)    Physical exam:  General - NAD, resting comfortably    Labs:                      8.6    3.44  )-----------( 233      ( 09-11 @ 06:17 )             26.0                9.3    3.16  )-----------( 233      ( 09-10 @ 08:29 )             29.1                8.4    3.77  )-----------( 240      ( 09-09 @ 21:04 )             25.6                8.5    3.58  )-----------( 240      ( 09-09 @ 07:24 )             26.5     11 Sep 2024 06:17    134<L>  |  99     |  14     ----------------------------<  92     4.4     |  24     |  1.1    10 Sep 2024 08:29    130<L>  |  98     |  15     ----------------------------<  95     4.4     |  22     |  1.0      Ca    8.3<L>      11 Sep 2024 06:17  Ca    8.2<L>      10 Sep 2024 08:29  Phos  4.2       09 Sep 2024 07:24  Mg     1.9       11 Sep 2024 06:17  Mg     1.9       10 Sep 2024 08:29    TPro  5.2<L>  /  Alb  2.8<L>  /  TBili  0.3    /  DBili  x      /  AST  15     /  ALT  8      /  AlkPhos  66     09 Sep 2024 07:24    MEDICATIONS  (STANDING):  acetaminophen     Tablet .. 650 milliGRAM(s) Oral every 8 hours  atorvastatin 20 milliGRAM(s) Oral at bedtime  chlorhexidine 2% Cloths 1 Application(s) Topical daily  dextrose 5%. 1000 milliLiter(s) (50 mL/Hr) IV Continuous <Continuous>  dextrose 5%. 1000 milliLiter(s) (100 mL/Hr) IV Continuous <Continuous>  dextrose 50% Injectable 12.5 Gram(s) IV Push once  dextrose 50% Injectable 25 Gram(s) IV Push once  dextrose 50% Injectable 25 Gram(s) IV Push once  glucagon  Injectable 1 milliGRAM(s) IntraMuscular once  hydrALAZINE 50 milliGRAM(s) Oral every 12 hours  insulin lispro (ADMELOG) corrective regimen sliding scale   SubCutaneous three times a day before meals  lactated ringers. 1000 milliLiter(s) (150 mL/Hr) IV Continuous <Continuous>  metoprolol succinate ER 50 milliGRAM(s) Oral daily    MEDICATIONS  (PRN):  dextrose Oral Gel 15 Gram(s) Oral once PRN Blood Glucose LESS THAN 70 milliGRAM(s)/deciliter   PGY4 Note    HPI: Pt seen at bedside, resting comfortably. She is going to the OR today for ExLap, JUSTA BSO, with GynOnc and SurgOnc assistance. She is s/p 1L Golytely + timed Flagyl and Neomycin for bowel prep. She has been NPO since midnight with IVF @150cc/hr. She received IVC filter yesterday with IR, Heparin drip was discontinued yesterday at 1900.       Physical Exam  Vital Signs Last 24 Hrs  T(F): 98 (12 Sep 2024 04:57), Max: 98.5 (11 Sep 2024 17:47)  HR: 60 (12 Sep 2024 04:57) (59 - 64)  BP: 166/73 (12 Sep 2024 04:57) (160/64 - 182/78)  RR: 18 (12 Sep 2024 04:57) (18 - 18)    Physical exam:  General - NAD, resting comfortably    Labs:                      8.6    3.44  )-----------( 233      ( 09-11 @ 06:17 )             26.0                9.3    3.16  )-----------( 233      ( 09-10 @ 08:29 )             29.1                8.4    3.77  )-----------( 240      ( 09-09 @ 21:04 )             25.6                8.5    3.58  )-----------( 240      ( 09-09 @ 07:24 )             26.5     11 Sep 2024 06:17    134<L>  |  99     |  14     ----------------------------<  92     4.4     |  24     |  1.1    10 Sep 2024 08:29    130<L>  |  98     |  15     ----------------------------<  95     4.4     |  22     |  1.0      Ca    8.3<L>      11 Sep 2024 06:17  Ca    8.2<L>      10 Sep 2024 08:29  Phos  4.2       09 Sep 2024 07:24  Mg     1.9       11 Sep 2024 06:17  Mg     1.9       10 Sep 2024 08:29    TPro  5.2<L>  /  Alb  2.8<L>  /  TBili  0.3    /  DBili  x      /  AST  15     /  ALT  8      /  AlkPhos  66     09 Sep 2024 07:24    MEDICATIONS  (STANDING):  acetaminophen     Tablet .. 650 milliGRAM(s) Oral every 8 hours  atorvastatin 20 milliGRAM(s) Oral at bedtime  chlorhexidine 2% Cloths 1 Application(s) Topical daily  dextrose 5%. 1000 milliLiter(s) (50 mL/Hr) IV Continuous <Continuous>  dextrose 5%. 1000 milliLiter(s) (100 mL/Hr) IV Continuous <Continuous>  dextrose 50% Injectable 12.5 Gram(s) IV Push once  dextrose 50% Injectable 25 Gram(s) IV Push once  dextrose 50% Injectable 25 Gram(s) IV Push once  glucagon  Injectable 1 milliGRAM(s) IntraMuscular once  hydrALAZINE 50 milliGRAM(s) Oral every 12 hours  insulin lispro (ADMELOG) corrective regimen sliding scale   SubCutaneous three times a day before meals  lactated ringers. 1000 milliLiter(s) (150 mL/Hr) IV Continuous <Continuous>  metoprolol succinate ER 50 milliGRAM(s) Oral daily    MEDICATIONS  (PRN):  dextrose Oral Gel 15 Gram(s) Oral once PRN Blood Glucose LESS THAN 70 milliGRAM(s)/deciliter

## 2024-09-12 NOTE — CONSULT NOTE ADULT - CONSULT REQUESTED DATE/TIME
06-Sep-2024 09:54
05-Sep-2024 08:37
05-Sep-2024 17:26
10-Sep-2024 01:50
05-Sep-2024 12:36
09-Sep-2024 14:39
12-Sep-2024 15:28
05-Sep-2024 14:43
09-Sep-2024 10:02

## 2024-09-12 NOTE — BRIEF OPERATIVE NOTE - OPERATION/FINDINGS
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 Spillage upon dissection.   Ureters identified bilaterally.  Normal small uterus. Normal appearing left adnexa.   Slightly abnormal appendix, removed.   Three large tumors along mesentry of small bowel. Resection and reanastomosis.   Hard mass appreciated in transverse colon. Resection and reanastomosis by Dr Morris.   Tumor debulking from bowel serosa, umbilical peritoneum, perihepatic peritoneum on right, posterior cul de sac.

## 2024-09-12 NOTE — CONSULT NOTE ADULT - PROVIDER SPECIALTY LIST ADULT
Gastroenterology
Intervent Radiology
SICU
Surgery
Vascular Surgery
Heme/Onc
Intervent Radiology
GYN
Gyn Onc

## 2024-09-13 LAB
GAS PNL BLDA: SIGNIFICANT CHANGE UP
GLUCOSE BLDC GLUCOMTR-MCNC: 112 MG/DL — HIGH (ref 70–99)
GLUCOSE BLDC GLUCOMTR-MCNC: 116 MG/DL — HIGH (ref 70–99)
GLUCOSE BLDC GLUCOMTR-MCNC: 203 MG/DL — HIGH (ref 70–99)
GLUCOSE BLDC GLUCOMTR-MCNC: 207 MG/DL — HIGH (ref 70–99)
GLUCOSE BLDC GLUCOMTR-MCNC: 211 MG/DL — HIGH (ref 70–99)

## 2024-09-13 PROCEDURE — 71045 X-RAY EXAM CHEST 1 VIEW: CPT | Mod: 26

## 2024-09-13 PROCEDURE — 99291 CRITICAL CARE FIRST HOUR: CPT | Mod: 24

## 2024-09-13 RX ORDER — ALBUMIN (HUMAN) 5 G/20ML
100 SOLUTION INTRAVENOUS ONCE
Refills: 0 | Status: COMPLETED | OUTPATIENT
Start: 2024-09-13 | End: 2024-09-13

## 2024-09-13 RX ORDER — ENOXAPARIN SODIUM 100 MG/ML
40 INJECTION SUBCUTANEOUS EVERY 24 HOURS
Refills: 0 | Status: DISCONTINUED | OUTPATIENT
Start: 2024-09-13 | End: 2024-09-14

## 2024-09-13 RX ORDER — ALBUMIN (HUMAN) 5 G/20ML
250 SOLUTION INTRAVENOUS
Refills: 0 | Status: COMPLETED | OUTPATIENT
Start: 2024-09-13 | End: 2024-09-13

## 2024-09-13 RX ADMIN — Medication 4: at 12:49

## 2024-09-13 RX ADMIN — Medication 2: at 00:32

## 2024-09-13 RX ADMIN — FAMOTIDINE 20 MILLIGRAM(S): 10 INJECTION INTRAVENOUS at 13:32

## 2024-09-13 RX ADMIN — ALBUMIN (HUMAN) 50 MILLILITER(S): 5 SOLUTION INTRAVENOUS at 01:56

## 2024-09-13 RX ADMIN — ALBUMIN (HUMAN) 125 MILLILITER(S): 5 SOLUTION INTRAVENOUS at 06:33

## 2024-09-13 RX ADMIN — ALBUMIN (HUMAN) 125 MILLILITER(S): 5 SOLUTION INTRAVENOUS at 09:27

## 2024-09-13 RX ADMIN — HYDROMORPHONE HYDROCHLORIDE 0.25 MILLIGRAM(S): 2 TABLET ORAL at 09:24

## 2024-09-13 RX ADMIN — ENOXAPARIN SODIUM 40 MILLIGRAM(S): 100 INJECTION SUBCUTANEOUS at 10:10

## 2024-09-13 RX ADMIN — ACETAMINOPHEN 400 MILLIGRAM(S): 325 TABLET ORAL at 10:53

## 2024-09-13 RX ADMIN — CHLORHEXIDINE GLUCONATE 1 APPLICATION(S): 40 SOLUTION TOPICAL at 05:36

## 2024-09-13 RX ADMIN — CEFOTETAN 100 GRAM(S): 2 INJECTION, POWDER, FOR SOLUTION INTRAMUSCULAR; INTRAVENOUS at 05:36

## 2024-09-13 RX ADMIN — CHLORHEXIDINE GLUCONATE 15 MILLILITER(S): 40 SOLUTION TOPICAL at 05:36

## 2024-09-13 RX ADMIN — Medication 100 MILLILITER(S): at 05:36

## 2024-09-13 RX ADMIN — ALBUMIN (HUMAN) 50 MILLILITER(S): 5 SOLUTION INTRAVENOUS at 13:33

## 2024-09-13 NOTE — PROGRESS NOTE ADULT - ATTENDING COMMENTS
This patient has a high probability of sudden, clinically significant deterioration, which requires the highest level of physician preparedness to intervene urgently. I managed/supervised life or organ supporting interventions that required frequent physician assessment. I devoted my full attention in the ICU to the direct care of this patient for the period of time indicated below. Time I spent with the family or surrogate(s) is included only if the patient was incapable of providing the necessary information or participating in medical decision making. Time devoted to teaching and to any procedures I billed separately is not included.     Patient examined and evaluated at the bedside with SICU team. I performed a medically appropriate exam. Pertinent findings are detailed below. Vital signs, laboratory work, and imaging reviewed.     Neuro: awake, alert, moving extremities, difficulty communicating due to patient speaking particular Brazilian dialect  #Postoperative pain - dilaudid sliding scale 0.25/0.5mg Q3H PRN, precedex for sedation, tylenol 1000mg    Respiratory: respirations even and unlabored on mechanical ventilation  CXR reviewed and interpreted by me - mildly increased vascular congestion  Mode: CPAP with PS, FiO2: 40, PEEP: 5, PS: 6, MAP: 8, PIP: 12  ABG - ( 13 Sep 2024 05:03 )  pH, Arterial: 7.37  pH, Blood: x     /  pCO2: 29    /  pO2: 139   / HCO3: 17    / Base Excess: -7.6  /  SaO2: 99.6    #Mechanical ventilation - SBT this AM, RSBI 34, positive cuff leak, RR 13-15, check ABG, if metabolic acidosis improves can likely extubate    CV: monitor hemodynamics, MAP goal > 65  #HTN - required pressors intra-op, hold anti-hypertensives unless SBP > 180, can use IV PRN antihypertensives, continue beta blocker marcelino-op (metoprolol IV 5mg Q6H until able to take PO)    GI: midline wound intact with staples, incisional tenderness  #Nutrition - NPO  #Ppx - Pepcid (mechanical ventilation)  ( 12 Sep 2024 14:12 )  Alb: 2.4 g/dL / Pro: 3.8 g/dL / AlkPhos: 47 U/L / ALT: 15 U/L / AST: 31 U/L / GGT:x     / Tbili 0.7 mg/dL/ Dbili x     / Indbili x        Renal: Continue I&Os monitoring, replete electrolytes as needed  09-12    132<L>  |  102  |  15  ----------------------------<  210<H>  4.5   |  14<L>  |  1.1    Ca 7.9<L>; Mg 2.6<H>; Phos 4.9    UOP - OUT: 590 mL  I/O - IN: 2391 mL / OUT: 610 mL / NET: 1781 mL    Fischer catheter - hourly I/O in critically ill patient  #Metabolic acidosis - trend bicarbonate, improving on repeat ABG, continue resuscitation with LR    Heme: continue to evaluate for acute blood loss anemia- trend Hg/Hct                         11.4   10.91 )-----------( 222      ( 12 Sep 2024 21:35 )             34.0     #Acute blood loss anemia - trend hemoglobin, transfuse for hgb < 7.0, consider for <8.0 if hemodynamics unstable  #Anticoagulation - start lovenox  #Lower extremity DVT - discuss when to start therapeutic anticoagulation with primary team    ID: trend WBC, monitor for fevers, marcelino-op cefotetan    Endocrine: Prevent and treat hyperglycemia with insulin as needed    PV: follow pulse exam    MSK: OOB and mobilize as able, PT eval    Skin: decub precautions    Lines: PIV, Fischer, arterial line, ETT, OGT  DVT Prophylaxis: holding per primary  Stress Ulcer Prophylaxis: Pepcid (mechanical ventilation)  Disposition: SICU - metabolic acidosis, mechanical ventilation, at risk for hemodynamic instability    Nathan Leonardo MD  Trauma/Acute Care Surgery/Surgical Critical Care Attending This patient has a high probability of sudden, clinically significant deterioration, which requires the highest level of physician preparedness to intervene urgently. I managed/supervised life or organ supporting interventions that required frequent physician assessment. I devoted my full attention in the ICU to the direct care of this patient for the period of time indicated below. Time I spent with the family or surrogate(s) is included only if the patient was incapable of providing the necessary information or participating in medical decision making. Time devoted to teaching and to any procedures I billed separately is not included.     Patient examined and evaluated at the bedside with SICU team. I performed a medically appropriate exam. Pertinent findings are detailed below. Vital signs, laboratory work, and imaging reviewed.     Neuro: awake, alert, moving extremities, difficulty communicating due to patient speaking particular Croatian dialect  #Postoperative pain - dilaudid sliding scale 0.25/0.5mg Q3H PRN, precedex for sedation, tylenol 1000mg    Respiratory: respirations even and unlabored on mechanical ventilation  CXR reviewed and interpreted by me - mildly increased vascular congestion  Mode: CPAP with PS, FiO2: 40, PEEP: 5, PS: 6, MAP: 8, PIP: 12  ABG - ( 13 Sep 2024 05:03 )  pH, Arterial: 7.37  pH, Blood: x     /  pCO2: 29    /  pO2: 139   / HCO3: 17    / Base Excess: -7.6  /  SaO2: 99.6    #Mechanical ventilation - SBT this AM, RSBI 34, positive cuff leak, RR 13-15, check ABG, if metabolic acidosis improves can likely extubate    CV: monitor hemodynamics, MAP goal > 65  #HTN - required pressors intra-op, hold anti-hypertensives unless SBP > 180, can use IV PRN antihypertensives, continue beta blocker marcelino-op (metoprolol IV 5mg Q6H until able to take PO)    GI: midline wound intact with staples, incisional tenderness  #Nutrition - NPO  #Ppx - Pepcid (mechanical ventilation)  ( 12 Sep 2024 14:12 )  Alb: 2.4 g/dL / Pro: 3.8 g/dL / AlkPhos: 47 U/L / ALT: 15 U/L / AST: 31 U/L / GGT:x     / Tbili 0.7 mg/dL/ Dbili x     / Indbili x        Renal: Continue I&Os monitoring, replete electrolytes as needed  09-12    132<L>  |  102  |  15  ----------------------------<  210<H>  4.5   |  14<L>  |  1.1    Ca 7.9<L>; Mg 2.6<H>; Phos 4.9    UOP - OUT: 590 mL  I/O - IN: 2391 mL / OUT: 610 mL / NET: 1781 mL    Fischer catheter - hourly I/O in critically ill patient  #Metabolic acidosis - trend bicarbonate, improving on repeat ABG, continue resuscitation with LR    Heme: continue to evaluate for acute blood loss anemia- trend Hg/Hct                         11.4   10.91 )-----------( 222      ( 12 Sep 2024 21:35 )             34.0     #Acute blood loss anemia - trend hemoglobin, transfuse for hgb < 7.0, consider for <8.0 if hemodynamics unstable  #Anticoagulation - start lovenox  #Lower extremity DVT - discuss when to start therapeutic anticoagulation with primary team    ID: trend WBC, monitor for fevers, marcelino-op cefotetan    Endocrine: Prevent and treat hyperglycemia with insulin as needed    PV: follow pulse exam    MSK: OOB and mobilize as able, PT eval    Skin: decub precautions    Lines: PIV, Fischer, arterial line, ETT, OGT  DVT Prophylaxis: holding per primary  Stress Ulcer Prophylaxis: Pepcid (mechanical ventilation)  Disposition: SICU - metabolic acidosis, mechanical ventilation, at risk for hemodynamic instability    Addendum: Patient successfully extubated to aerosol mask.    Nathan Leonardo MD  Trauma/Acute Care Surgery/Surgical Critical Care Attending

## 2024-09-13 NOTE — DIETITIAN INITIAL EVALUATION ADULT - ADD RECOMMEND
1. Diet advancement differed to GYN team  2. Goal diet: CHO consistent   3. encourage and assist with PO intake   4. will assess for nutrition supplements at f/u

## 2024-09-13 NOTE — DIETITIAN INITIAL EVALUATION ADULT - PERTINENT LABORATORY DATA
09-12    132<L>  |  102  |  15  ----------------------------<  210<H>  4.5   |  14<L>  |  1.1    Ca    7.9<L>      12 Sep 2024 21:35  Phos  4.9     09-12  Mg     2.6     09-12    TPro  3.8<L>  /  Alb  2.4<L>  /  TBili  0.7  /  DBili  x   /  AST  31  /  ALT  15  /  AlkPhos  47  09-12  POCT Blood Glucose.: 211 mg/dL (09-13-24 @ 06:19)  A1C with Estimated Average Glucose Result: 5.4 % (09-06-24 @ 08:03)

## 2024-09-13 NOTE — DIETITIAN INITIAL EVALUATION ADULT - NSFNSGIIOFT_GEN_A_CORE
IBW: 47.7kg     09-12-24 @ 07:01  -  09-13-24 @ 07:00  --------------------------------------------------------  OUT:    Nasogastric/Oral tube (mL): 20 mL  Total OUT: 20 mL    Total NET: -20 mL

## 2024-09-13 NOTE — PROGRESS NOTE ADULT - ASSESSMENT
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      # abdomino-pelvic mass, likely arising from right adnexa, s/p Ex-Lap (as above)  - s/p 2u PRBC intraop  - s/p pressors intraop and shortly postop in PACU  - left the OR unable to be extubated due to metabolic acidosis, still intubated  - PT has been following  - recommend [facility] consult  - final pathology pending results  - kumar catheter in place, about 30-40cc/hr, monitor I/Os  - NPO diet  - cefotetan 2g q8r   - pain management as needed    # bilateral  LE DVTs  - VA Duplex LE bilateral: Right lower extremity DVT in peroneal vein and gastrocnemius veins. Left lower extremity DVT in gastrocnemius, posterior tibial, and peroneal veins.  - Previously on Heparin Drip. IVC filters placed by IR on 9/11 in preparation for the OR.   - NO SCDS  - At this time, recommend holding off on AC for at least 24 hours. Will re-assess later today.    #Ascending Aortic Aneurysm ~4cm. HTN.   - S/p normal TTE otherwise on 9/11  - systolic blood pressure was 170 in ED  - previously on toprol xl 50mg daily &  atorvastatin 20mg daily  - previously on hydralazine 50mg BID  - currently on  metoprolol tartrate IV 5mg q6h    Primary care per SICU team.  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      # abdomino-pelvic mass, likely arising from right adnexa, s/p Ex-Lap (as above)  - s/p 2u PRBC intraop  - s/p pressors intraop and shortly postop in PACU  - left the OR unable to be extubated due to metabolic acidosis, still intubated  - PT has been following  - recommend [facility] consult  - final pathology pending results  - kumar catheter in place, about 30-40cc/hr, monitor I/Os  - recommend advancing to [facility]  - may d/c cefotetan 2g  - pain management as needed    # bilateral  LE DVTs  - VA Duplex LE bilateral: Right lower extremity DVT in peroneal vein and gastrocnemius veins. Left lower extremity DVT in gastrocnemius, posterior tibial, and peroneal veins.  - Previously on Heparin Drip. IVC filters placed by IR on 9/11 in preparation for the OR.   - NO SCDS  - May start prophylactic lovenox, and if tolerating PO tomorrow then to start therapeutic eliquis.    #Ascending Aortic Aneurysm ~4cm. HTN.   - S/p normal TTE otherwise on 9/11  - systolic blood pressure was 170 in ED  - previously on toprol xl 50mg daily &  atorvastatin 20mg daily  - previously on hydralazine 50mg BID  - currently on  metoprolol tartrate IV 5mg q6h    Primary care per SICU team.

## 2024-09-13 NOTE — PROGRESS NOTE ADULT - SUBJECTIVE AND OBJECTIVE BOX
GENERAL SURGERY PROGRESS NOTE     SKYLAR ORTIZ  [address] [place] [address] day :10d    POD:1d  Procedure: Exploratory laparotomy by gynecology    JUSTA & BSO    Omentectomy    Small bowel resection    Resection, transverse colon    Appendectomy    Debulking of pelvic tumor      Surgical Attending: Junior Morris  Overnight events: Pt hypotensive overnight with MAPs to 50s, received 500cc bolus of LR and 25% albumin 100cc. She remained intubated overnight with settings 450/16/40/5.    T(F): 97 (09-13-24 @ 08:00), Max: 98 (09-12-24 @ 17:45)  HR: 69 (09-13-24 @ 10:00) (60 - 70)  BP: 98/54 (09-13-24 @ 10:00) (92/54 - 147/72)  ABP: 125/44 (09-13-24 @ 07:00) (101/53 - 148/65)  ABP(mean): 68 (09-13-24 @ 07:00) (61 - 112)  RR: 14 (09-13-24 @ 10:00) (14 - 21)  SpO2: 100% (09-13-24 @ 10:00) (100% - 100%)    IN'S / OUT's:    09-12-24 @ 07:01  -  09-13-24 @ 07:00  --------------------------------------------------------  IN:    Dexmedetomidine: 41 mL    IV PiggyBack: 150 mL    Lactated Ringers: 1700 mL    Lactated Ringers Bolus: 500 mL  Total IN: 2391 mL    OUT:    Indwelling Catheter - Urethral (mL): 590 mL    Nasogastric/Oral tube (mL): 20 mL  Total OUT: 610 mL    Total NET: 1781 mL      09-13-24 @ 07:01  -  09-13-24 @ 11:18  --------------------------------------------------------  IN:    Dexmedetomidine: 14.1 mL    IV PiggyBack: 250 mL    Lactated Ringers: 300 mL  Total IN: 564.1 mL    OUT:    Indwelling Catheter - Urethral (mL): 60 mL  Total OUT: 60 mL    Total NET: 504.1 mL          PHYSICAL EXAM:  GENERAL: NAD, intubated   CHEST/LUNG: mechanically ventilated, equal chest rise b/l  HEART: Regular rate and rhythm  ABDOMEN: Soft, mildly tender, Nondistended;   EXTREMITIES:  No clubbing, cyanosis, or edema      LABS  Labs:  CAPILLARY BLOOD GLUCOSE      POCT Blood Glucose.: 211 mg/dL (13 Sep 2024 06:19)  POCT Blood Glucose.: 207 mg/dL (13 Sep 2024 00:15)  POCT Blood Glucose.: 177 mg/dL (12 Sep 2024 18:33)  POCT Blood Glucose.: 129 mg/dL (12 Sep 2024 16:14)                          11.4   10.91 )-----------( 222      ( 12 Sep 2024 21:35 )             34.0       Auto Neutrophil %: 85.4 % (09-12-24 @ 14:12)  Auto Immature Granulocyte %: 0.8 % (09-12-24 @ 14:12)    09-12    132<L>  |  102  |  15  ----------------------------<  210<H>  4.5   |  14<L>  |  1.1      Calcium: 7.9 mg/dL (09-12-24 @ 21:35)      LFTs:             3.8  | 0.7  | 31       ------------------[47      ( 12 Sep 2024 14:12 )  2.4  | x    | 15          Lipase:x      Amylase:x         Blood Gas Arterial, Lactate: 1.5 mmol/L (09-13-24 @ 09:56)  Blood Gas Arterial, Lactate: 3.4 mmol/L (09-13-24 @ 05:03)  Blood Gas Arterial, Lactate: 2.8 mmol/L (09-12-24 @ 23:14)  Blood Gas Arterial, Lactate: 2.9 mmol/L (09-12-24 @ 18:39)  Blood Gas Arterial, Lactate: 1.8 mmol/L (09-12-24 @ 14:07)    ABG - ( 13 Sep 2024 09:56 )  pH: 7.37  /  pCO2: 32    /  pO2: 169   / HCO3: 18    / Base Excess: -6.1  /  SaO2: 99.8            ABG - ( 13 Sep 2024 05:03 )  pH: 7.37  /  pCO2: 29    /  pO2: 139   / HCO3: 17    / Base Excess: -7.6  /  SaO2: 99.6            ABG - ( 12 Sep 2024 23:14 )  pH: 7.35  /  pCO2: 25    /  pO2: 178   / HCO3: 14    / Base Excess: -10.3 /  SaO2: 99.4              Coags:     x      ----< x       ( 11 Sep 2024 17:14 )     153.5               Urinalysis Basic - ( 12 Sep 2024 21:35 )    Color: x / Appearance: x / SG: x / pH: x  Gluc: 210 mg/dL / Ketone: x  / Bili: x / Urobili: x   Blood: x / Protein: x / Nitrite: x   Leuk Esterase: x / RBC: x / WBC x   Sq Epi: x / Non Sq Epi: x / Bacteria: x            RADIOLOGY & ADDITIONAL TESTS:      A/P:  SKYLAR ORTIZ is a 79F s/p ExLap, JUSTA, BSO, omentectomy, appendectomy, small bowel resection+reanastomosis, transverse colon resection+reanastomosis, tumor debulking with General surgery intra-op consult for segmental transverse colectomy with anastomosis on 9/12.         PLAN:   - hemodynamic monitoring   - monitor respiratory status, wean vent settings as tolerated   - SBT as tolerated   - DVT and GI ppx  - daily labs, replete electrolytes as needed   - multi modal pain control  - Rest of care per OBGYN/ SICU      #DVT ppx: enoxaparin Injectable 40 milliGRAM(s) SubCutaneous every 24 hours    #GI ppx: famotidine Injectable 20 milliGRAM(s) IV Push daily    Disposition:  SICU    Above plan to be discussed with Attending Surgeon Dr. Morris  , patient, patient family, and rest of health care team    [id number]

## 2024-09-13 NOTE — DIETITIAN INITIAL EVALUATION ADULT - NSFNSNUTRCULTURALRELIGIOUSFT_GEN_A_CORE
n/a

## 2024-09-13 NOTE — DIETITIAN INITIAL EVALUATION ADULT - ORAL INTAKE PTA/DIET HISTORY
Pt intubated at time of RD visit. Will obtain nutrition hx at f/u.   --Reason ventilator discontinued: extubated per mechanical vent documentation 9/13

## 2024-09-13 NOTE — PROGRESS NOTE ADULT - ASSESSMENT
NEURO/PSYCH:  #Sedation while intubated  - RASS goal: 0 to -1  - dexMEDEtomidine Infusion 0.2 MICROgram(s)/kG/Hr IV Continuous <Continuous>  #Acute pain  - acetaminophen IV 1000mg q6h PRN for mild pain (1 - 3)  - HYDROmorphone  Injectable 0.25 milliGRAM(s) IV Push every 3 hours PRN Moderate Pain (4 - 6)  - HYDROmorphone  Injectable 0.5 milliGRAM(s) IV Push every 3 hours PRN Severe Pain (7 - 10)    RESP:   #mechanical intubation  - Intubated on , remained intubated postoperatively. ETT lip line 22cm. A/C 450/16/40/5  - wean as tolerated  - Postoperative AB-12 @ 14:07--7.30 / 33 / 183 / 16 / Lactate 1.8 / iCal 1.13    #Activity  - increase as tolerated    Postoperative CXR: OG tube in place, no acute pulmonary pathology    CARDIAC:   #acute hypotension intraoperatively requiring vasopressor support  - hypotensive in OR, placed on levophed gtt, max dose of 0.04 mg/kg/hr. Currently off    #PMHx of HTN, ascending aortic aneurysm found on imaging  - metoprolol succinate 50mg PO --> metoprolol tartrate IV 5mg q6h  - holding hydralazine 50mg q12h  - holding atorvastatin 20mg QD    Imaging:   - EKG: HR 59, sinus bradycardia. QTc 540 --> Mg 2g x 2 doses given    GI/NUTR:   #partial resection and reanastomosis of transverse colon and small bowel  - Gastroenterology: outpatient colonoscopy for colonic mass seen on CT  - NPO  - OG tube at 53cm length, LWCS  - aspiration precautions, HOB 30    #GI Prophylaxis  - none indicated     #Bowel regimen  - holding    /RENAL:   #metabolic acidosis  - 1 amp bicarb given in OR without change  - postop ABG -  @ 14:07-- 7.30 / 33 / 183 / 16 / Lactate 1.8  - repeat ABG -  @ 18:39--7.28 / 29 / 165 / 14 / Lactate 2.9 / iCal 1.14 --> FiO2 30%, repeat ABG at 11pm  #urine output in critically ill  - indwelling kumar (placed  - )  #IVF  - LR at 100mL/hr    Labs:   BUN/Cr- 12/0.9  -->,  15/1.1  -->    Electrolytes-( @ 21:35)Na 132 // K 4.5 // Mg 2.6 // Phos 4.9     GYN:   #Metastatic malignancy, 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, now s/p exploratory laparotomy, excision of 25cm pelvic mass, JUSTA/BSO, partial resection and reanastomosis of small bowel and transverse colon on   - remained intubated postoperatively  - acutely hypotensive intraop, required max levophed gtt 0.04, currently off  - 2u PRBC intraop  - 24h postop cefotetan q8h  - hold DVT PPx for 24h per Gyn  - f/up further Gyn/onc workup    HEME/ONC:   #acute anemia  - 2u PRBC intraoperatively on , postop Hgb 11.6 --> 11.4    #bilateral LE DVTs present on admission, s/p IVC filter ()  - Right lower extremity DVT in peroneal vein and gastrocnemius veins, left lower extremity DVT in gastrocnemius, posterior tibial, and peroneal veins. Pt was on heparin gtt preoperatively.  - Vascular: Consulted for r/o DVTs. Bilateral lower extremity duplex showed Right lower extremity DVT in peroneal vein and gastrocnemius veins and Left lower extremity DVT in gastrocnemius, posterior tibial, and peroneal veins; recommend AC for 3 months and outpatient follow-up.   - IVC filter placed by IR on   - Mechanical DVT contraindicated due to active DVTs  - hold DVT ppx for 24h per Gyn      Labs: Hb/Hct:  11.6/35.1  ( @ 14:12)  -->,  11.4/34.0  ( @ 21:35)  -->                      Plts:  207  -->,  222  -->                 PTT/INR:        T&S:  Expires:     ID:  #leukopenia likely in the setting of malignancy  WBC- 3.44  --->>,  3.99  --->>,  10.91  --->>  Temp trend- 24hrs T(F): 96.9 ( @ 00:00), Max: 98 ( @ 07:22)  Current antibiotics-cefoTEtan  IVPB 2 <User Schedule>  #postoperative prophylaxis  Current antibiotics-cefoTEtan  IVPB 2g q8h for 24h postop    ENDO:  #PMHx of prediabetes  - A1c,  - 5.4%  - FSG q6 while NPO  - Glucose goal 140-180  - Insulin sliding scale    MSK:  #no active issues  - Activity - Increase As Tolerated:     DERM:  - DTI screen pending    LINES/DRAINS:  PIV, Kumar Indwelling Urethral Catheter, Arterial Line ( - ), ETT ( - ), OG ( - )    ADVANCED DIRECTIVES:      HCP/Emergency Contact - silverio Thompson. [phone]    INDICATION FOR SICU: Intra-abdominal and pelvic swelling of mass or lump             DISPO:   SICU    Case discussed with attending Dr. Leonardo 79y Female w/ PMHx of metastatic colon/ovarian cancer now s/p exploratory laparotomy, excision of 25cm pelvic mass, JUSTA/BSO, partial resection and reanastomosis of small bowel and transverse colon on 9/12, complicated by acute hypotension requiring vasopressor support. SICU consulted and pt admitted to SICU for hemodynamic monitoring and mechanical ventilation.    NEURO/PSYCH:  #Acute pain  - acetaminophen IV 1000mg q6h PRN for mild pain (1 - 3)  - HYDROmorphone  Injectable 0.25 milliGRAM(s) IV Push every 3 hours PRN Moderate Pain (4 - 6)  - HYDROmorphone  Injectable 0.5 milliGRAM(s) IV Push every 3 hours PRN Severe Pain (7 - 10)    RESP:   #mechanical intubation - resolved  - extubated to face mask, now on room air  - wean as tolerated    #Activity  - increase as tolerated    Postoperative CXR: OG tube in place, no acute pulmonary pathology    CARDIAC:   #acute hypotension intraoperatively requiring vasopressor support  - hypotensive in OR, placed on levophed gtt, max dose of 0.04 mg/kg/hr. Currently off    #PMHx of HTN, ascending aortic aneurysm found on imaging  - metoprolol succinate 50mg PO --> metoprolol tartrate IV 5mg q6h  - holding hydralazine 50mg q12h  - holding atorvastatin 20mg QD    Imaging:   - EKG: HR 59, sinus bradycardia. QTc 540 --> Mg 2g x 2 doses given    GI/NUTR:   #partial resection and reanastomosis of transverse colon and small bowel  - Gastroenterology: outpatient colonoscopy for colonic mass seen on CT  - Advanced to clears per Gyn  - aspiration precautions, HOB 30    #GI Prophylaxis  - famotidine d/c'd after extubation     #Bowel regimen  - holding    /RENAL:   #metabolic acidosis - resolving  - 1 amp bicarb given in OR without change  - ABG 09-13 @ 11:38--7.40 / 32 / 147 / 20 / [id number] 100.0 / Lactate 1.4 / iCal 1.21   #urine output in critically ill  - indwelling kumar (placed 9/12 - )  #IVF  - LR at 100mL/hr    Labs:   BUN/Cr- 12/0.9  -->,  15/1.1  -->    Electrolytes-(09-12 @ 21:35)Na 132 // K 4.5 // Mg 2.6 // Phos 4.9     GYN:   #Metastatic malignancy, 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, now s/p exploratory laparotomy, excision of 25cm pelvic mass, JUSTA/BSO, partial resection and reanastomosis of small bowel and transverse colon on 9/12  - remained intubated postoperatively  - acutely hypotensive intraop, required max levophed gtt 0.04, currently off  - 2u PRBC intraop  - 24h postop cefotetan q8h - complete  - hold DVT PPx for 24h per Gyn --> started 9/13  - f/up further Gyn/onc workup    HEME/ONC:   #acute anemia  - 2u PRBC intraoperatively on 9/12, postop Hgb 11.6 --> 11.4    #bilateral LE DVTs present on admission, s/p IVC filter (9/11)  - Right lower extremity DVT in peroneal vein and gastrocnemius veins, left lower extremity DVT in gastrocnemius, posterior tibial, and peroneal veins. Pt was on heparin gtt preoperatively.  - Vascular: Consulted for r/o DVTs. Bilateral lower extremity duplex showed Right lower extremity DVT in peroneal vein and gastrocnemius veins and Left lower extremity DVT in gastrocnemius, posterior tibial, and peroneal veins; recommend AC for 3 months and outpatient follow-up.   - IVC filter placed by IR on 9/11  - Mechanical DVT contraindicated due to active DVTs  - start therapeutic AC on 9/14 per Gyn    DVT PPx: lovenox      Labs: Hb/Hct:  11.6/35.1  (09-12 @ 14:12)  -->,  11.4/34.0  (09-12 @ 21:35)  -->                      Plts:  207  -->,  222  -->                 PTT/INR:        T&S:  Expires: 9/14    ID:  #leukopenia likely in the setting of malignancy  WBC- 3.44  --->>,  3.99  --->>,  10.91  --->>  Temp trend- 24hrs T(F): 96.9 (09-13 @ 00:00), Max: 98 (09-12 @ 07:22)  Current antibiotics-cefoTEtan  IVPB 2 <User Schedule>  #postoperative prophylaxis  Completed antibiotics - cefoTEtan  IVPB 2g q8h for 24h postop     ENDO:  #PMHx of prediabetes  - A1c, 9/6 - 5.4%  - FSG q6 while NPO  - Glucose goal 140-180  - Insulin sliding scale    MSK:  #no active issues  - Activity - Increase As Tolerated:     DERM:  - DTI screen negative    LINES/DRAINS:  PIV, Kumar Indwelling Urethral Catheter, Arterial Line (9/12 - 9/13), ETT (9/12 - 9/13), OG (9/12 - 9/13)    ADVANCED DIRECTIVES:      HCP/Emergency Contact - silverio Thompson. [phone]    INDICATION FOR SICU: Intra-abdominal and pelvic swelling of mass or lump       DISPO:   SICU    Case discussed with attending Dr. Leonardo

## 2024-09-13 NOTE — PROGRESS NOTE ADULT - SUBJECTIVE AND OBJECTIVE BOX
ANGELSKYLAR   [id number]   47    79yF  ============================================================   DATE OF INITIAL SICU CONSULT: 24    INDICATION FOR SICU CONSULT: remained intubated postoperatively, intraoperative vasopressor support     SICU COURSE EVENTS :   - admitted to SICU service     24HOUR EVENTS      NIGHT   -PM rounds: precedex @ 0.2, LR @ 100cc, 450/16/40/5, SBP 140s, HR 60s, saturating 100%, midline incision C/D/I, abdomen soft, non-tender, UOP 30-40cc/hr   -23:30 AB.35/25/178/14 lactate 2.8 > 25% albumin 100cc   -Tightened sliding scale  -Hypotensive to MAPS 50s > 500cc bolus     DAY  - off levo since 1430  - ABG  @ 18:39--7.28 / 29 / 165 / 14 / Lactate 2.9 / iCal 1.14 --> FiO2 30%, rpt ABG in 4h. UOP 30mL/hr  - cefotetan 2g q8h for 24h postop per SICU attending      [X] A ten-point review of systems was negative except as expressed in note.  [X] History was obtained from patient. If unable to participate in their care, history obtained from review of the chart and collateral sources of information.  ============================================================      HPI   79-year-old female with unclear PMHx presented to ED on 24 for right groin pain with radiation down anterior leg and constipation x 2 days. Patient arrived from [facility] 1 week prior, family noticed decreased appetite and her complaints of right groin pain worsened when walking and decreased bowel movements, despite utilizing prune juice and increasing fluids. Tylenol occasionally improves pain. Patient was seen by her primary Dr. Jefferson last night and directed to the emergency department. Patient's daughter Queta notes patient underwent a recent surgery to her right colon and Phelan, unsure what was done or what was removed but is concerned that her constipation and new pain could be related to the surgery. As per primary Dr. Jefferson, pt was diagnosed with metastatic cancer in [facility], possibly colon as primary. Was informed needed to start chemo, however there was no chemo available there at the time.    In the ED, pt was hypertensive to SBP to 170s, afebrile. CT A/P showed 25cm pelvic mass, multiple cystic lesions, and colonic mass concerning for malignancy, bilateral common femoral vein filling defects, bilateral mild hydronephrosis, and incidental findings of ascending aortic aneurysm, calcified splenic artery aneurysm, and small right pleural effusion. Bilateral lower extremity duplex showed Right lower extremity DVT in peroneal vein and gastrocnemius veins and Left lower extremity DVT in gastrocnemius, posterior tibial, and peroneal veins. Pt was admitted to medicine for further workup, see specialty consults below.    ANGEL [facility]   [id number]   47    79yF  ============================================================   DATE OF INITIAL SICU CONSULT: 24    INDICATION FOR SICU CONSULT: remained intubated postoperatively, intraoperative vasopressor support     SICU COURSE EVENTS :   - admitted to SICU service     24HOUR EVENTS      NIGHT   -PM rounds: precedex @ 0.2, LR @ 100cc, 450/16/40/5, SBP 140s, HR 60s, saturating 100%, midline incision C/D/I, abdomen soft, non-tender, UOP 30-40cc/hr   -23:30 AB.35/25/178/14 lactate 2.8 > 25% albumin 100cc   -Tightened sliding scale  -Hypotensive to MAPS 50s > 500cc bolus     DAY  - off levo since 1430  - ABG  @ 18:39--7.28 / 29 / 165 / 14 / Lactate 2.9 / iCal 1.14 --> FiO2 30%, rpt ABG in 4h. UOP 30mL/hr  - cefotetan 2g q8h for 24h postop per SICU attending      [X] A ten-point review of systems was negative except as expressed in note.  [X] History was obtained from patient. If unable to participate in their care, history obtained from review of the chart and collateral sources of information.  ============================================================   Daily Height in cm: 154.9 (13 Sep 2024 11:05)    Daily     Diet, Clear Liquid:   Consistent Carbohydrate No Snacks (CSTCHO)  Supplement Feeding Modality:  Oral  Ensure Clear Cans or Servings Per Day:  1       Frequency:  Three Times a day (24 @ 13:44)      CURRENT MEDS:  Neurologic Medications  HYDROmorphone  Injectable 0.25 milliGRAM(s) IV Push every 3 hours PRN Moderate Pain (4 - 6)  HYDROmorphone  Injectable 0.5 milliGRAM(s) IV Push every 3 hours PRN Severe Pain (7 - 10)    Respiratory Medications    Cardiovascular Medications  metoprolol tartrate Injectable 5 milliGRAM(s) IV Push every 6 hours    Gastrointestinal Medications  lactated ringers. 1000 milliLiter(s) IV Continuous <Continuous>    Genitourinary Medications    Hematologic/Oncologic Medications  enoxaparin Injectable 40 milliGRAM(s) SubCutaneous every 24 hours    Antimicrobial/Immunologic Medications    Endocrine/Metabolic Medications  insulin lispro (ADMELOG) corrective regimen sliding scale   SubCutaneous every 6 hours    Topical/Other Medications  chlorhexidine 2% Cloths 1 Application(s) Topical <User Schedule>      ICU Vital Signs Last 24 Hrs  T(C): 35.8 (13 Sep 2024 16:00), Max: 36.7 (12 Sep 2024 17:45)  T(F): 96.5 (13 Sep 2024 16:00), Max: 98 (12 Sep 2024 17:45)  HR: 79 (13 Sep 2024 15:00) (60 - 79)  BP: 116/54 (13 Sep 2024 15:00) (92/54 - 144/65)  BP(mean): 78 (13 Sep 2024 15:00) (69 - 97)  ABP: 125/44 (13 Sep 2024 07:00) (101/53 - 148/65)  ABP(mean): 68 (13 Sep 2024 07:00) (61 - 112)  RR: 16 (13 Sep 2024 15:00) (14 - 21)  SpO2: 99% (13 Sep 2024 15:00) (97% - 100%)    O2 Parameters below as of 13 Sep 2024 07:00  Patient On (Oxygen Delivery Method): ventilator    O2 Concentration (%): 40        Mode: standby    ABG - ( 13 Sep 2024 11:38 )  pH, Arterial: 7.40  pH, Blood: x     /  pCO2: 32    /  pO2: 147   / HCO3: 20    / Base Excess: -4.4  /  SaO2: 100.0               I&O's Summary    12 Sep 2024 07:01  -  13 Sep 2024 07:00  --------------------------------------------------------  IN: 2391 mL / OUT: 610 mL / NET: 1781 mL    13 Sep 2024 07:  -  13 Sep 2024 16:46  --------------------------------------------------------  IN: 1364.1 mL / OUT: 145 mL / NET: 1219.1 mL      I&O's Detail    12 Sep 2024 07:  -  13 Sep 2024 07:00  --------------------------------------------------------  IN:    Dexmedetomidine: 41 mL    IV PiggyBack: 150 mL    Lactated Ringers: 1700 mL    Lactated Ringers Bolus: 500 mL  Total IN: 2391 mL    OUT:    Indwelling Catheter - Urethral (mL): 590 mL    Nasogastric/Oral tube (mL): 20 mL  Total OUT: 610 mL    Total NET: 1781 mL      13 Sep 2024 07:  -  13 Sep 2024 16:46  --------------------------------------------------------  IN:    Dexmedetomidine: 14.1 mL    IV PiggyBack: 100 mL    IV PiggyBack: 350 mL    Lactated Ringers: 900 mL  Total IN: 1364.1 mL    OUT:    Indwelling Catheter - Urethral (mL): 145 mL  Total OUT: 145 mL    Total NET: 1219.1 mL          24 @ 07:  -  24 @ 07:00  --------------------------------------------------------  IN: 0 mL/kg/d / OUT: 9.37 mL/kg/d / NET: -9.37 mL/kg/d    24 @ 07:01  -  24 @ 16:46  --------------------------------------------------------  IN: 0 mL/kg/d / OUT: 2.3 mL/kg/d / NET: -2.3 mL/kg/d        PHYSICAL EXAM:    General: Pt lying comfortably in bed.     Neuro:  GCS: 11T    = E 4  / V  1T / M  6    Neuro: Alert, following commands, no focal deficits. CNs II-XI grossly intact. PERRLA, EOMs grossly intact. Spontaneously moving all extremities.    Lungs: Clear to auscultation bilaterally, normal expansion/effort. Oxygen delivery: Volume A/C 450/16/40/5    Cardiovascular : S1, S2.  Regular rate and rhythm. Cardiac Rhythm: NSR  Peripheral edema: minimal pitting edema in bilateral lower extremities.    GI: Abdomen soft, mildly tender to palpation, mildly distended. Orogastric tube in place at 53cm.     Wound: Midline incision secured with staples, c/d/i. No warmth or exudates.     Extremities: Extremities warm, pink, well-perfused.        - Pulse exam:  DP/PT palpable bilaterally.     Derm: Good skin turgor, no skin breakdown.       - DTI: negative    : Fischer catheter in place.     LABS:  CAPILLARY BLOOD GLUCOSE      POCT Blood Glucose.: 203 mg/dL (13 Sep 2024 12:31)  POCT Blood Glucose.: 211 mg/dL (13 Sep 2024 06:19)  POCT Blood Glucose.: 207 mg/dL (13 Sep 2024 00:15)  POCT Blood Glucose.: 177 mg/dL (12 Sep 2024 18:33)                          11.4   10.91 )-----------( 222      ( 12 Sep 2024 21:35 )             34.0       09    132<L>  |  102  |  15  ----------------------------<  210<H>  4.5   |  14<L>  |  1.1    Ca    7.9<L>      12 Sep 2024 21:35  Phos  4.9       Mg     2.6         TPro  3.8<L>  /  Alb  2.4<L>  /  TBili  0.7  /  DBili  x   /  AST  31  /  ALT  15  /  AlkPhos  47  -      PTT - ( 11 Sep 2024 17:14 )  PTT:153.5 sec      Urinalysis Basic - ( 12 Sep 2024 21:35 )    Color: x / Appearance: x / SG: x / pH: x  Gluc: 210 mg/dL / Ketone: x  / Bili: x / Urobili: x   Blood: x / Protein: x / Nitrite: x   Leuk Esterase: x / RBC: x / WBC x   Sq Epi: x / Non Sq Epi: x / Bacteria: x

## 2024-09-13 NOTE — DIETITIAN INITIAL EVALUATION ADULT - OTHER INFO
--79-year-old female  presented to ED on 9/4/24 for right groin pain with radiation down anterior leg and constipation x 2 days.   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

## 2024-09-13 NOTE — DIETITIAN INITIAL EVALUATION ADULT - OTHER CALCULATIONS
Energy: 1058-1270kcal/day 9using MSJ 1-1.2AF due to ICU vs. overwt BMI)   Protein: 76-82g/day (using 1.2-1.3g/kg -- same reason as above)   Fluid: 1mL/kcal/day

## 2024-09-13 NOTE — PROGRESS NOTE ADULT - ATTENDING COMMENTS
POD1 s/p Elap/JUSTA/BSO/omentectomy/SBR/LBR tumor debulking.  Appears alert, on vent.  Labs stable, only concern is elevated lactate.  Would plan to AC tomorrow, would give prophylaxis for PE today.  Advance to clears as tolerated.

## 2024-09-13 NOTE — PROGRESS NOTE ADULT - SUBJECTIVE AND OBJECTIVE BOX
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 Still intubated, on Precedex. Overnight, she received a 500cc bolus for a moment of hypotension that has now resolved. Continues to be off pressors. H/h remains stable. Urine output about 30-40cc/hr.     Physical Exam  Vital Signs Last 24 Hrs  T(C): 36.3 (13 Sep 2024 04:00), Max: 36.7 (12 Sep 2024 07:18)  T(F): 97.3 (13 Sep 2024 04:00), Max: 98 (12 Sep 2024 07:22)  HR: 67 (13 Sep 2024 05:00) (60 - 69)  BP: 119/58 (13 Sep 2024 05:00) (92/54 - 166/73)  BP(mean): 83 (13 Sep 2024 05:00) (69 - 104)  RR: 16 (13 Sep 2024 05:00) (16 - 21)  SpO2: 100% (13 Sep 2024 05:00) (95% - 100%)    Parameters below as of 13 Sep 2024 05:00  Patient On (Oxygen Delivery Method): ventilator    Physical exam:  General - Intubated and sedated. OG tube in place.  Abdomen- Soft, nontender, nondistended, vertical Ex-Lap incision with dressing on top and staples underneath c/d/i  Pelvis/Vagina - No bleeding  Extremities - No calf tenderness, no swelling   - Fischer catheter in place with clear urine     Labs:             11.4   10.91 )-----------( 222      ( 09-12 @ 21:35 )             34.0                11.6   3.99  )-----------( 207      ( 09-12 @ 14:12 )             35.1                8.6    3.44  )-----------( 233      ( 09-11 @ 06:17 )             26.0                9.3    3.16  )-----------( 233      ( 09-10 @ 08:29 )             29.1     12 Sep 2024 21:35    132<L>  |  102    |  15     ----------------------------<  210<H>  4.5     |  14<L>  |  1.1    12 Sep 2024 14:12    134<L>  |  105    |  12     ----------------------------<  135<H>  3.9     |  17     |  0.9      Ca    7.9<L>      12 Sep 2024 21:35  Ca    7.5<L>      12 Sep 2024 14:12  Phos  4.9       12 Sep 2024 21:35  Phos  4.3       12 Sep 2024 14:12  Mg     2.6<H>     12 Sep 2024 21:35  Mg     1.5<L>     12 Sep 2024 14:12    TPro  3.8<L>  /  Alb  2.4<L>  /  TBili  0.7    /  DBili  x      /  AST  31     /  ALT  15     /  AlkPhos  47     12 Sep 2024 14:12    MEDICATIONS  (STANDING):  albumin human  5% IVPB 250 milliLiter(s) IV Intermittent every 2 hours  chlorhexidine 0.12% Liquid 15 milliLiter(s) Oral Mucosa every 12 hours  chlorhexidine 2% Cloths 1 Application(s) Topical <User Schedule>  dexMEDEtomidine Infusion 0.2 MICROgram(s)/kG/Hr (3.15 mL/Hr) IV Continuous <Continuous>  famotidine Injectable 20 milliGRAM(s) IV Push daily  insulin lispro (ADMELOG) corrective regimen sliding scale   SubCutaneous every 6 hours  lactated ringers Bolus 500 milliLiter(s) IV Bolus once  lactated ringers. 1000 milliLiter(s) (100 mL/Hr) IV Continuous <Continuous>  metoprolol tartrate Injectable 5 milliGRAM(s) IV Push every 6 hours    MEDICATIONS  (PRN):  acetaminophen   IVPB .. 1000 milliGRAM(s) IV Intermittent once PRN Mild Pain (1 - 3)  HYDROmorphone  Injectable 0.25 milliGRAM(s) IV Push every 3 hours PRN Moderate Pain (4 - 6)  HYDROmorphone  Injectable 0.5 milliGRAM(s) IV Push every 3 hours PRN Severe Pain (7 - 10)

## 2024-09-13 NOTE — DIETITIAN INITIAL EVALUATION ADULT - ENTERAL
Nutrition Intervention:meals and snacks  Nutrition Monitoring:diet order,energy intake,body composition,NFPF, lytes, GI (BM), BG

## 2024-09-13 NOTE — DIETITIAN INITIAL EVALUATION ADULT - PERTINENT MEDS FT
MEDICATIONS  (STANDING):  enoxaparin Injectable 40 milliGRAM(s) SubCutaneous every 24 hours  famotidine Injectable 20 milliGRAM(s) IV Push daily  insulin lispro (ADMELOG) corrective regimen sliding scale   SubCutaneous every 6 hours  lactated ringers. 1000 milliLiter(s) (100 mL/Hr) IV Continuous <Continuous>  metoprolol tartrate Injectable 5 milliGRAM(s) IV Push every 6 hours    MEDICATIONS  (PRN):  HYDROmorphone  Injectable 0.5 milliGRAM(s) IV Push every 3 hours PRN Severe Pain (7 - 10)  HYDROmorphone  Injectable 0.25 milliGRAM(s) IV Push every 3 hours PRN Moderate Pain (4 - 6)

## 2024-09-13 NOTE — PROGRESS NOTE ADULT - ATTENDING COMMENTS
Patient now awake, alert, off vent.  Abdomen soft, wound intact.  May start clears, advance slowly from General Surgical Perspective.

## 2024-09-14 LAB
ANION GAP SERPL CALC-SCNC: 9 MMOL/L — SIGNIFICANT CHANGE UP (ref 7–14)
ANION GAP SERPL CALC-SCNC: 9 MMOL/L — SIGNIFICANT CHANGE UP (ref 7–14)
BASOPHILS # BLD AUTO: 0 K/UL — SIGNIFICANT CHANGE UP (ref 0–0.2)
BASOPHILS # BLD AUTO: 0 K/UL — SIGNIFICANT CHANGE UP (ref 0–0.2)
BASOPHILS NFR BLD AUTO: 0 % — SIGNIFICANT CHANGE UP (ref 0–1)
BASOPHILS NFR BLD AUTO: 0 % — SIGNIFICANT CHANGE UP (ref 0–1)
BLD GP AB SCN SERPL QL: SIGNIFICANT CHANGE UP
BUN SERPL-MCNC: 14 MG/DL — SIGNIFICANT CHANGE UP (ref 10–20)
BUN SERPL-MCNC: 17 MG/DL — SIGNIFICANT CHANGE UP (ref 10–20)
CALCIUM SERPL-MCNC: 7.6 MG/DL — LOW (ref 8.4–10.5)
CALCIUM SERPL-MCNC: 7.7 MG/DL — LOW (ref 8.4–10.4)
CHLORIDE SERPL-SCNC: 105 MMOL/L — SIGNIFICANT CHANGE UP (ref 98–110)
CHLORIDE SERPL-SCNC: 105 MMOL/L — SIGNIFICANT CHANGE UP (ref 98–110)
CO2 SERPL-SCNC: 22 MMOL/L — SIGNIFICANT CHANGE UP (ref 17–32)
CO2 SERPL-SCNC: 22 MMOL/L — SIGNIFICANT CHANGE UP (ref 17–32)
CREAT SERPL-MCNC: 1.1 MG/DL — SIGNIFICANT CHANGE UP (ref 0.7–1.5)
CREAT SERPL-MCNC: 1.3 MG/DL — SIGNIFICANT CHANGE UP (ref 0.7–1.5)
EGFR: 42 ML/MIN/1.73M2 — LOW
EGFR: 52 ML/MIN/1.73M2 — LOW
EOSINOPHIL # BLD AUTO: 0 K/UL — SIGNIFICANT CHANGE UP (ref 0–0.7)
EOSINOPHIL # BLD AUTO: 0 K/UL — SIGNIFICANT CHANGE UP (ref 0–0.7)
EOSINOPHIL NFR BLD AUTO: 0 % — SIGNIFICANT CHANGE UP (ref 0–8)
EOSINOPHIL NFR BLD AUTO: 0 % — SIGNIFICANT CHANGE UP (ref 0–8)
GLUCOSE BLDC GLUCOMTR-MCNC: 124 MG/DL — HIGH (ref 70–99)
GLUCOSE BLDC GLUCOMTR-MCNC: 125 MG/DL — HIGH (ref 70–99)
GLUCOSE BLDC GLUCOMTR-MCNC: 126 MG/DL — HIGH (ref 70–99)
GLUCOSE BLDC GLUCOMTR-MCNC: 99 MG/DL — SIGNIFICANT CHANGE UP (ref 70–99)
GLUCOSE SERPL-MCNC: 102 MG/DL — HIGH (ref 70–99)
GLUCOSE SERPL-MCNC: 91 MG/DL — SIGNIFICANT CHANGE UP (ref 70–99)
HCT VFR BLD CALC: 22.5 % — LOW (ref 37–47)
HCT VFR BLD CALC: 22.9 % — LOW (ref 37–47)
HCT VFR BLD CALC: 24.6 % — LOW (ref 37–47)
HGB BLD-MCNC: 7.5 G/DL — LOW (ref 12–16)
HGB BLD-MCNC: 7.7 G/DL — LOW (ref 12–16)
HGB BLD-MCNC: 8.3 G/DL — LOW (ref 12–16)
IMM GRANULOCYTES NFR BLD AUTO: 0.6 % — HIGH (ref 0.1–0.3)
IMM GRANULOCYTES NFR BLD AUTO: 1.1 % — HIGH (ref 0.1–0.3)
LYMPHOCYTES # BLD AUTO: 0.65 K/UL — LOW (ref 1.2–3.4)
LYMPHOCYTES # BLD AUTO: 0.71 K/UL — LOW (ref 1.2–3.4)
LYMPHOCYTES # BLD AUTO: 7 % — LOW (ref 20.5–51.1)
LYMPHOCYTES # BLD AUTO: 7.5 % — LOW (ref 20.5–51.1)
MAGNESIUM SERPL-MCNC: 1.9 MG/DL — SIGNIFICANT CHANGE UP (ref 1.8–2.4)
MAGNESIUM SERPL-MCNC: 2.2 MG/DL — SIGNIFICANT CHANGE UP (ref 1.8–2.4)
MCHC RBC-ENTMCNC: 29 PG — SIGNIFICANT CHANGE UP (ref 27–31)
MCHC RBC-ENTMCNC: 29.6 PG — SIGNIFICANT CHANGE UP (ref 27–31)
MCHC RBC-ENTMCNC: 29.6 PG — SIGNIFICANT CHANGE UP (ref 27–31)
MCHC RBC-ENTMCNC: 33.3 G/DL — SIGNIFICANT CHANGE UP (ref 32–37)
MCHC RBC-ENTMCNC: 33.6 G/DL — SIGNIFICANT CHANGE UP (ref 32–37)
MCHC RBC-ENTMCNC: 33.7 G/DL — SIGNIFICANT CHANGE UP (ref 32–37)
MCV RBC AUTO: 86 FL — SIGNIFICANT CHANGE UP (ref 81–99)
MCV RBC AUTO: 88.1 FL — SIGNIFICANT CHANGE UP (ref 81–99)
MCV RBC AUTO: 88.9 FL — SIGNIFICANT CHANGE UP (ref 81–99)
MONOCYTES # BLD AUTO: 0.52 K/UL — SIGNIFICANT CHANGE UP (ref 0.1–0.6)
MONOCYTES # BLD AUTO: 0.53 K/UL — SIGNIFICANT CHANGE UP (ref 0.1–0.6)
MONOCYTES NFR BLD AUTO: 5.6 % — SIGNIFICANT CHANGE UP (ref 1.7–9.3)
MONOCYTES NFR BLD AUTO: 5.6 % — SIGNIFICANT CHANGE UP (ref 1.7–9.3)
NEUTROPHILS # BLD AUTO: 8.04 K/UL — HIGH (ref 1.4–6.5)
NEUTROPHILS # BLD AUTO: 8.15 K/UL — HIGH (ref 1.4–6.5)
NEUTROPHILS NFR BLD AUTO: 85.8 % — HIGH (ref 42.2–75.2)
NEUTROPHILS NFR BLD AUTO: 86.8 % — HIGH (ref 42.2–75.2)
NRBC # BLD: 0 /100 WBCS — SIGNIFICANT CHANGE UP (ref 0–0)
PHOSPHATE SERPL-MCNC: 2.5 MG/DL — SIGNIFICANT CHANGE UP (ref 2.1–4.9)
PHOSPHATE SERPL-MCNC: 3.8 MG/DL — SIGNIFICANT CHANGE UP (ref 2.1–4.9)
PLATELET # BLD AUTO: 158 K/UL — SIGNIFICANT CHANGE UP (ref 130–400)
PLATELET # BLD AUTO: 161 K/UL — SIGNIFICANT CHANGE UP (ref 130–400)
PLATELET # BLD AUTO: 180 K/UL — SIGNIFICANT CHANGE UP (ref 130–400)
PMV BLD: 10 FL — SIGNIFICANT CHANGE UP (ref 7.4–10.4)
PMV BLD: 9.6 FL — SIGNIFICANT CHANGE UP (ref 7.4–10.4)
PMV BLD: 9.8 FL — SIGNIFICANT CHANGE UP (ref 7.4–10.4)
POTASSIUM SERPL-MCNC: 4.1 MMOL/L — SIGNIFICANT CHANGE UP (ref 3.5–5)
POTASSIUM SERPL-MCNC: 4.2 MMOL/L — SIGNIFICANT CHANGE UP (ref 3.5–5)
POTASSIUM SERPL-SCNC: 4.1 MMOL/L — SIGNIFICANT CHANGE UP (ref 3.5–5)
POTASSIUM SERPL-SCNC: 4.2 MMOL/L — SIGNIFICANT CHANGE UP (ref 3.5–5)
RBC # BLD: 2.53 M/UL — LOW (ref 4.2–5.4)
RBC # BLD: 2.6 M/UL — LOW (ref 4.2–5.4)
RBC # BLD: 2.86 M/UL — LOW (ref 4.2–5.4)
RBC # FLD: 16.7 % — HIGH (ref 11.5–14.5)
RBC # FLD: 16.8 % — HIGH (ref 11.5–14.5)
RBC # FLD: 17 % — HIGH (ref 11.5–14.5)
SODIUM SERPL-SCNC: 136 MMOL/L — SIGNIFICANT CHANGE UP (ref 135–146)
SODIUM SERPL-SCNC: 136 MMOL/L — SIGNIFICANT CHANGE UP (ref 135–146)
WBC # BLD: 8.82 K/UL — SIGNIFICANT CHANGE UP (ref 4.8–10.8)
WBC # BLD: 9.27 K/UL — SIGNIFICANT CHANGE UP (ref 4.8–10.8)
WBC # BLD: 9.49 K/UL — SIGNIFICANT CHANGE UP (ref 4.8–10.8)
WBC # FLD AUTO: 8.82 K/UL — SIGNIFICANT CHANGE UP (ref 4.8–10.8)
WBC # FLD AUTO: 9.27 K/UL — SIGNIFICANT CHANGE UP (ref 4.8–10.8)
WBC # FLD AUTO: 9.49 K/UL — SIGNIFICANT CHANGE UP (ref 4.8–10.8)

## 2024-09-14 PROCEDURE — 99291 CRITICAL CARE FIRST HOUR: CPT | Mod: 24

## 2024-09-14 RX ORDER — SODIUM PHOSPHATE, MONOBASIC, MONOHYDRATE AND SODIUM PHOSPHATE, DIBASIC ANHYDROUS 276; 142 MG/ML; MG/ML
30 INJECTION, SOLUTION INTRAVENOUS ONCE
Refills: 0 | Status: COMPLETED | OUTPATIENT
Start: 2024-09-14 | End: 2024-09-15

## 2024-09-14 RX ORDER — HYDRALAZINE HCL 50 MG
5 TABLET ORAL ONCE
Refills: 0 | Status: COMPLETED | OUTPATIENT
Start: 2024-09-14 | End: 2024-09-14

## 2024-09-14 RX ORDER — METOPROLOL TARTRATE 100 MG/1
25 TABLET ORAL
Refills: 0 | Status: DISCONTINUED | OUTPATIENT
Start: 2024-09-14 | End: 2024-09-18

## 2024-09-14 RX ORDER — OXYCODONE HYDROCHLORIDE 5 MG/1
5 TABLET ORAL EVERY 4 HOURS
Refills: 0 | Status: DISCONTINUED | OUTPATIENT
Start: 2024-09-14 | End: 2024-09-18

## 2024-09-14 RX ORDER — APIXABAN 5 MG/1
5 TABLET, FILM COATED ORAL EVERY 12 HOURS
Refills: 0 | Status: DISCONTINUED | OUTPATIENT
Start: 2024-09-14 | End: 2024-09-18

## 2024-09-14 RX ORDER — ACETAMINOPHEN 325 MG/1
975 TABLET ORAL EVERY 6 HOURS
Refills: 0 | Status: DISCONTINUED | OUTPATIENT
Start: 2024-09-14 | End: 2024-09-18

## 2024-09-14 RX ORDER — HYDRALAZINE HCL 50 MG
25 TABLET ORAL
Refills: 0 | Status: DISCONTINUED | OUTPATIENT
Start: 2024-09-14 | End: 2024-09-14

## 2024-09-14 RX ORDER — HYDROXYZINE HCL 25 MG
25 TABLET ORAL EVERY 6 HOURS
Refills: 0 | Status: DISCONTINUED | OUTPATIENT
Start: 2024-09-14 | End: 2024-09-14

## 2024-09-14 RX ORDER — HYDRALAZINE HCL 50 MG
50 TABLET ORAL EVERY 12 HOURS
Refills: 0 | Status: DISCONTINUED | OUTPATIENT
Start: 2024-09-15 | End: 2024-09-18

## 2024-09-14 RX ORDER — OXYCODONE HYDROCHLORIDE 5 MG/1
2.5 TABLET ORAL EVERY 4 HOURS
Refills: 0 | Status: DISCONTINUED | OUTPATIENT
Start: 2024-09-14 | End: 2024-09-18

## 2024-09-14 RX ADMIN — Medication 20 MILLIGRAM(S): at 22:30

## 2024-09-14 RX ADMIN — OXYCODONE HYDROCHLORIDE 2.5 MILLIGRAM(S): 5 TABLET ORAL at 16:52

## 2024-09-14 RX ADMIN — APIXABAN 5 MILLIGRAM(S): 5 TABLET, FILM COATED ORAL at 17:34

## 2024-09-14 RX ADMIN — HYDROMORPHONE HYDROCHLORIDE 0.25 MILLIGRAM(S): 2 TABLET ORAL at 08:38

## 2024-09-14 RX ADMIN — Medication 75 MILLILITER(S): at 19:58

## 2024-09-14 RX ADMIN — METOPROLOL TARTRATE 5 MILLIGRAM(S): 100 TABLET ORAL at 00:30

## 2024-09-14 RX ADMIN — ACETAMINOPHEN 975 MILLIGRAM(S): 325 TABLET ORAL at 09:09

## 2024-09-14 RX ADMIN — Medication 25 MILLIGRAM(S): at 17:13

## 2024-09-14 RX ADMIN — METOPROLOL TARTRATE 5 MILLIGRAM(S): 100 TABLET ORAL at 11:30

## 2024-09-14 RX ADMIN — ACETAMINOPHEN 975 MILLIGRAM(S): 325 TABLET ORAL at 22:30

## 2024-09-14 RX ADMIN — ACETAMINOPHEN 975 MILLIGRAM(S): 325 TABLET ORAL at 23:00

## 2024-09-14 RX ADMIN — ACETAMINOPHEN 975 MILLIGRAM(S): 325 TABLET ORAL at 09:39

## 2024-09-14 RX ADMIN — METOPROLOL TARTRATE 5 MILLIGRAM(S): 100 TABLET ORAL at 05:38

## 2024-09-14 RX ADMIN — HYDROMORPHONE HYDROCHLORIDE 0.25 MILLIGRAM(S): 2 TABLET ORAL at 08:23

## 2024-09-14 RX ADMIN — Medication 100 MILLILITER(S): at 05:38

## 2024-09-14 RX ADMIN — METOPROLOL TARTRATE 25 MILLIGRAM(S): 100 TABLET ORAL at 18:37

## 2024-09-14 RX ADMIN — ACETAMINOPHEN 975 MILLIGRAM(S): 325 TABLET ORAL at 17:34

## 2024-09-14 RX ADMIN — Medication 75 MILLILITER(S): at 22:30

## 2024-09-14 RX ADMIN — CHLORHEXIDINE GLUCONATE 1 APPLICATION(S): 40 SOLUTION TOPICAL at 05:50

## 2024-09-14 RX ADMIN — ENOXAPARIN SODIUM 40 MILLIGRAM(S): 100 INJECTION SUBCUTANEOUS at 09:09

## 2024-09-14 RX ADMIN — Medication 5 MILLIGRAM(S): at 19:57

## 2024-09-14 RX ADMIN — Medication 75 MILLILITER(S): at 09:39

## 2024-09-14 NOTE — PROGRESS NOTE ADULT - ATTENDING COMMENTS
This patient has a high probability of sudden, clinically significant deterioration, which requires the highest level of physician preparedness to intervene urgently. I managed/supervised life or organ supporting interventions that required frequent physician assessment. I devoted my full attention in the ICU to the direct care of this patient for the period of time indicated below. Time I spent with the family or surrogate(s) is included only if the patient was incapable of providing the necessary information or participating in medical decision making. Time devoted to teaching and to any procedures I billed separately is not included.     Patient examined and evaluated at the bedside with SICU team. I performed a medically appropriate exam. Pertinent findings are detailed below. Vital signs, laboratory work, and imaging reviewed.     Neuro: awake, alert moving extremities  #Postoperative Pain - dilaudid 0.25mg/0.5mg Q3H PRN, tylenol 975mg Q6H    Respiratory: respirations even and unlabored on room    CV: monitor hemodynamics, MAP goal > 65  #HTN - metoprolol 5mg IV Q6H, hold anti-hypertensives in setting of acute blood loss anemia    GI: midline wound intact with staples, incisional tenderness  ( 12 Sep 2024 14:12 )  Alb: 2.4 g/dL / Pro: 3.8 g/dL / AlkPhos: 47 U/L / ALT: 15 U/L / AST: 31 U/L / GGT:x     / Tbili 0.7 mg/dL/ Dbili x     / Indbili x      #Nutrition - clear liquid diet  #Ppx - d/c pepcid    Renal: Continue I&Os monitoring, replete electrolytes as needed  09-14    136  |  105  |  17  ----------------------------<  91  4.2   |  22  |  1.3    Ca 7.7<L>; Mg 2.2; Phos 3.8      UOP - OUT: 762 mL    I/O - IN: 2764.1 mL / OUT: 762 mL / NET: 2002.1 mL    Fischer catheter - hourly I/O in critically ill patient, possible D/C later today  IVF - change to D5 1/2 NS @ 75 cc/h    Heme: continue to evaluate for acute blood loss anemia- trend Hg/Hct                         7.5    8.82  )-----------( 158      ( 14 Sep 2024 05:32 )             22.5       Anticoagulation - lovenox 40mg Q24H  #Acute blood loss anemia - hemoglobin drop of 4 points, no obvious bleeding  #Lower extremity DVT - has IVC filter, hold therapeutic anticoagulation due to acute blood loss anemia    ID: trend WBC, monitor for fevers, marcelino-op cefotetan completed    Endocrine: Prevent and treat hyperglycemia with insulin as needed    PV: follow pulse exam    MSK: OOB and mobilize as able, PT eval    Skin: decub precautions    Lines: PIV, Fischer  DVT Prophylaxis: lovenox   Stress Ulcer Prophylaxis: d/c pepcid  Disposition: SICU - at risk for bleeding, at risk for hemodynamic instability    Nathan Leonardo MD  Trauma/Acute Care Surgery/Surgical Critical Care Attending This patient has a high probability of sudden, clinically significant deterioration, which requires the highest level of physician preparedness to intervene urgently. I managed/supervised life or organ supporting interventions that required frequent physician assessment. I devoted my full attention in the ICU to the direct care of this patient for the period of time indicated below. Time I spent with the family or surrogate(s) is included only if the patient was incapable of providing the necessary information or participating in medical decision making. Time devoted to teaching and to any procedures I billed separately is not included.     Patient examined and evaluated at the bedside with SICU team. I performed a medically appropriate exam. Pertinent findings are detailed below. Vital signs, laboratory work, and imaging reviewed.     Neuro: awake, alert moving extremities  #Postoperative Pain - dilaudid 0.25mg/0.5mg Q3H PRN, tylenol 975mg Q6H    Respiratory: respirations even and unlabored on room    CV: monitor hemodynamics, MAP goal > 65  #HTN - metoprolol 5mg IV Q6H, hold anti-hypertensives in setting of acute blood loss anemia    GI: midline wound intact with staples, incisional tenderness  ( 12 Sep 2024 14:12 )  Alb: 2.4 g/dL / Pro: 3.8 g/dL / AlkPhos: 47 U/L / ALT: 15 U/L / AST: 31 U/L / GGT:x     / Tbili 0.7 mg/dL/ Dbili x     / Indbili x      #Nutrition - clear liquid diet  #Ppx - d/c pepcid    Renal: Continue I&Os monitoring, replete electrolytes as needed  09-14    136  |  105  |  17  ----------------------------<  91  4.2   |  22  |  1.3    Ca 7.7<L>; Mg 2.2; Phos 3.8      UOP - OUT: 762 mL    I/O - IN: 2764.1 mL / OUT: 762 mL / NET: 2002.1 mL    Fischer catheter - hourly I/O in critically ill patient, possible D/C later today  IVF - change to D5 1/2 NS @ 75 cc/h    Heme: continue to evaluate for acute blood loss anemia- trend Hg/Hct                         7.5    8.82  )-----------( 158      ( 14 Sep 2024 05:32 )             22.5       Anticoagulation - lovenox 40mg Q24H  #Acute blood loss anemia - hemoglobin drop of 4 points, no obvious bleeding, repeat CBC later this morning, transfuse for hgb < 7.0, consider for hgb < 8.0 if evidence of end-organ ischemia, BP appropriate with normal HR (though patient is on beta blocker)  #Lower extremity DVT - has IVC filter, hold therapeutic anticoagulation due to acute blood loss anemia    ID: trend WBC, monitor for fevers, marcelino-op cefotetan completed    Endocrine: Prevent and treat hyperglycemia with insulin as needed    PV: follow pulse exam    MSK: OOB and mobilize as able, PT eval for mobilization    Skin: decub precautions    Lines: PIV, Fischer  DVT Prophylaxis: lovenox   Stress Ulcer Prophylaxis: d/c pepcid  Disposition: SICU - at risk for bleeding, at risk for hemodynamic instability    If hemoglobin stable and no evidence of ischemia, can consider transfer to floor this afternoon.    Nathan Leonardo MD  Trauma/Acute Care Surgery/Surgical Critical Care Attending

## 2024-09-14 NOTE — PROGRESS NOTE ADULT - ASSESSMENT
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      # abdomino-pelvic mass, likely arising from right adnexa, s/p Ex-Lap (as above)  - H/h downtrending, last hgb 7.5. F/u 1100 CBC. Recommend transfusion of 1U PRBCs.   - S/p 2u PRBC intraop  - 9/12 s/p pressors intraop and shortly postop. Extubated 9/13.   - Kumar cather in place draining adequate urine. Monitor I/Os. Can d/c kumar if repeat 1100 hgb stable.   - s/p 24hrs of cefotetan  - PT has been following  - recommend hemeonc consult  - final pathology pending results  - C/w CLD  - pain management per [facility]    # bilateral  LE DVTs  - VA Duplex LE bilateral: Right lower extremity DVT in peroneal vein and gastrocnemius veins. Left lower extremity DVT in gastrocnemius, posterior tibial, and peroneal veins.  - Previously on Heparin Drip. IVC filters placed by IR on 9/11 in preparation for the OR.   - NO SCDS  - Will hold therapeutic AC until hgb stable.     #Ascending Aortic Aneurysm ~4cm. HTN.   - S/p normal TTE otherwise on 9/11  - metoprolol tartrate IV 5mg q6h ordered  - previously on toprol xl 50mg daily, hydralazine 5mg q12 hrs & atorvastatin 20mg daily    Primary care per SICU team.    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      # abdomino-pelvic mass, likely arising from right adnexa, s/p Ex-Lap (as above)  - H/h downtrending, last hgb 7.5. F/u 1100 CBC. Recommend transfusion of 1U PRBCs.   - S/p 2u PRBC intraop  - 9/12 s/p pressors intraop and shortly postop. Extubated 9/13.   - Kumar cather in place draining adequate urine. Monitor I/Os. Can d/c kumar if repeat 1100 hgb stable.   - s/p 24hrs of cefotetan  - PT has been following  - recommend hemeonc consult  - final pathology pending results  - C/w CLD  - pain management PRN     # bilateral  LE DVTs  - VA Duplex LE bilateral: Right lower extremity DVT in peroneal vein and gastrocnemius veins. Left lower extremity DVT in gastrocnemius, posterior tibial, and peroneal veins.  - Previously on Heparin Drip. IVC filters placed by IR on 9/11 in preparation for the OR.   - NO SCDS  - Will hold therapeutic AC until hgb stable.     #Ascending Aortic Aneurysm ~4cm. HTN.   - S/p normal TTE otherwise on 9/11  - metoprolol tartrate IV 5mg q6h ordered  - previously on toprol xl 50mg daily, hydralazine 5mg q12 hrs & atorvastatin 20mg daily    Primary care per SICU team.

## 2024-09-14 NOTE — PROGRESS NOTE ADULT - SUBJECTIVE AND OBJECTIVE BOX
ANGEL ZE   [id number]   49    79yF  ============================================================   DATE OF INITIAL SICU CONSULT: 24    INDICATION FOR SICU CONSULT: remained intubated postoperatively, intraoperative vasopressor support     SICU COURSE EVENTS :   - admitted to SICU service. Given 500cc bolus, albumin 5% 250cc x 2, albumin 25% 100cc x 1   : Given albumin 25% 100cc x 1 for decreased UOP. Therapeutic lovenox started. Patient extubated, later on room air.      24HOUR EVENTS    NIGHT  PM rounds: HR 90s, SBP 130s, midline incision stapled no dressing, abdomen soft, mildly tender.  [id number]: used mcclelland dialect, feeling fine, AAO  - IVL if tolerating clears  - Refused labs, rescheduled for AM    DAY  - SBT: RSBI 34, + cuff leak. ABG 7.37/32/169/18.5, lac 1.5.   - Post extubation AB.40/32/147/20 lac 1.4  - now on room air  - LVX started --> start therapetic AC tomorrow per Gyn  - advanced to clears  - UOP decreased, SBP 90s --> albumin 25% 100mL x 1 --> increase in UOP  -==========================================     ANGEL [facility]   [id number]   49    79yF  ============================================================   DATE OF INITIAL SICU CONSULT: 24    INDICATION FOR SICU CONSULT: remained intubated postoperatively, intraoperative vasopressor support     SICU COURSE EVENTS :   - admitted to SICU service. Given 500cc bolus, albumin 5% 250cc x 2, albumin 25% 100cc x 1   : Given albumin 25% 100cc x 1 for decreased UOP. Therapeutic lovenox started. Patient extubated, later on room air.      24HOUR EVENTS    NIGHT  PM rounds: HR 90s, SBP 130s, midline incision stapled no dressing, abdomen soft, mildly tender.  [id number]: used mcclelland dialect, feeling fine, AAO  - IVL if tolerating clears  - Refused labs, rescheduled for AM    DAY  - SBT: RSBI 34, + cuff leak. ABG 7.37/32/169/18.5, lac 1.5.   - Post extubation AB.40/32/147/20 lac 1.4  - now on room air  - LVX started --> start therapetic AC tomorrow per Gyn  - advanced to clears  - UOP decreased, SBP 90s --> albumin 25% 100mL x 1 --> increase in UOP  -==========================================  Daily Height in cm: 154.9 (13 Sep 2024 11:05)    Daily Weight in k.3 (13 Sep 2024 22:00)    Diet, Clear Liquid:   Consistent Carbohydrate No Snacks (CSTCHO)  Supplement Feeding Modality:  Oral  Ensure Clear Cans or Servings Per Day:  1       Frequency:  Three Times a day (24 @ 13:44)      CURRENT MEDS:  Neurologic Medications  HYDROmorphone  Injectable 0.25 milliGRAM(s) IV Push every 3 hours PRN Moderate Pain (4 - 6)  HYDROmorphone  Injectable 0.5 milliGRAM(s) IV Push every 3 hours PRN Severe Pain (7 - 10)    Respiratory Medications    Cardiovascular Medications  metoprolol tartrate Injectable 5 milliGRAM(s) IV Push every 6 hours    Gastrointestinal Medications  lactated ringers. 1000 milliLiter(s) IV Continuous <Continuous>    Genitourinary Medications    Hematologic/Oncologic Medications  enoxaparin Injectable 40 milliGRAM(s) SubCutaneous every 24 hours    Antimicrobial/Immunologic Medications    Endocrine/Metabolic Medications  insulin lispro (ADMELOG) corrective regimen sliding scale   SubCutaneous Before meals and at bedtime    Topical/Other Medications  chlorhexidine 2% Cloths 1 Application(s) Topical <User Schedule>      ICU Vital Signs Last 24 Hrs  T(C): 36 (13 Sep 2024 20:00), Max: 36.4 (13 Sep 2024 12:00)  T(F): 96.8 (13 Sep 2024 20:00), Max: 97.5 (13 Sep 2024 12:00)  HR: 68 (14 Sep 2024 07:00) (67 - 94)  BP: 142/67 (14 Sep 2024 07:00) (97/49 - 157/65)  BP(mean): 96 (14 Sep 2024 07:00) (70 - 100)  ABP: --  ABP(mean): --  RR: 16 (14 Sep 2024 07:00) (13 - 29)  SpO2: 96% (14 Sep 2024 07:00) (93% - 100%)    O2 Parameters below as of 14 Sep 2024 07:00  Patient On (Oxygen Delivery Method): room air            Mode: standby    ABG - ( 13 Sep 2024 11:38 )  pH, Arterial: 7.40  pH, Blood: x     /  pCO2: 32    /  pO2: 147   / HCO3: 20    / Base Excess: -4.4  /  SaO2: 100.0               I&O's Summary    13 Sep 2024 07:01  -  14 Sep 2024 07:00  --------------------------------------------------------  IN: 2764.1 mL / OUT: 762 mL / NET: 2002.1 mL      I&O's Detail    13 Sep 2024 07:01  -  14 Sep 2024 07:00  --------------------------------------------------------  IN:    Dexmedetomidine: 14.1 mL    IV PiggyBack: 100 mL    IV PiggyBack: 350 mL    Lactated Ringers: 2300 mL  Total IN: 2764.1 mL    OUT:    Indwelling Catheter - Urethral (mL): 762 mL  Total OUT: 762 mL    Total NET: 2002.1 mL          24 @ 07:01  -  24 @ 07:00  --------------------------------------------------------  IN: 0 mL/kg/d / OUT: 12.1 mL/kg/d / NET: -12.1 mL/kg/d        PHYSICAL EXAM:    General: Pt lying comfortably in bed.     Neuro:  GCS:     = E   / V   / M      Neuro: Alert & oriented x 3, no focal deficits. CNs II-XII intact. PERRLA, EOMs intact. Strength 5/5 throughout, sensory to light touch intact.     Lungs: Clear to auscultation bilaterally, normal expansion/effort. Oxygen delivery: ** . Pulling ** on IS.  Vent: Mode: standby    Cardiovascular : S1, S2.  Regular rate and rhythm. Cardiac Rhythm: NSR  Peripheral edema:     GI: BS x 4. Abdomen soft, Non-tender, Non-distended. Gastrostomy / Jejunostomy tube in place, dressing c/d/i.  Nasogastric tube in place.  Colostomy / Ileostomy.      Wound: ***    Extremities: Extremities warm, pink, well-perfused.        - Pulse exam: Radial palpable/ dopplerable bilaterally. DP/PT ** palpable/ dopplerable bilaterally.     Derm: Good skin turgor, no skin breakdown.       - DTI: ***    : Fischer catheter in place/voiding freely.     CXR:     LABS:  CAPILLARY BLOOD GLUCOSE      POCT Blood Glucose.: 112 mg/dL (13 Sep 2024 23:56)  POCT Blood Glucose.: 116 mg/dL (13 Sep 2024 17:22)  POCT Blood Glucose.: 203 mg/dL (13 Sep 2024 12:31)                          7.5    8.82  )-----------( 158      ( 14 Sep 2024 05:32 )             22.5       09-14    136  |  105  |  17  ----------------------------<  91  4.2   |  22  |  1.3    Ca    7.7<L>      14 Sep 2024 05:32  Phos  3.8       Mg     2.2     14    TPro  3.8<L>  /  Alb  2.4<L>  /  TBili  0.7  /  DBili  x   /  AST  31  /  ALT  15  /  AlkPhos  47  09-12            Urinalysis Basic - ( 14 Sep 2024 05:32 )    Color: x / Appearance: x / SG: x / pH: x  Gluc: 91 mg/dL / Ketone: x  / Bili: x / Urobili: x   Blood: x / Protein: x / Nitrite: x   Leuk Esterase: x / RBC: x / WBC x   Sq Epi: x / Non Sq Epi: x / Bacteria: x           ANGEL [facility]   [id number]   49    79yF  ============================================================   DATE OF INITIAL SICU CONSULT: 24    INDICATION FOR SICU CONSULT: remained intubated postoperatively, intraoperative vasopressor support     SICU COURSE EVENTS :   - admitted to SICU service. Given 500cc bolus, albumin 5% 250cc x 2, albumin 25% 100cc x 1   : Given albumin 25% 100cc x 1 for decreased UOP. Therapeutic lovenox started. Patient extubated, later on room air.      24HOUR EVENTS    NIGHT  PM rounds: HR 90s, SBP 130s, midline incision stapled no dressing, abdomen soft, mildly tender.  [id number]: used mcclelland dialect, feeling fine, AAO  - IVL if tolerating clears  - Refused labs, rescheduled for AM    DAY  - SBT: RSBI 34, + cuff leak. ABG 7.37/32/169/18.5, lac 1.5.   - Post extubation AB.40/32/147/20 lac 1.4  - now on room air  - LVX started --> start therapetic AC tomorrow per Gyn  - advanced to clears  - UOP decreased, SBP 90s --> albumin 25% 100mL x 1 --> increase in UOP  -==========================================  Daily Height in cm: 154.9 (13 Sep 2024 11:05)    Daily Weight in k.3 (13 Sep 2024 22:00)    Diet, Clear Liquid:   Consistent Carbohydrate No Snacks (CSTCHO)  Supplement Feeding Modality:  Oral  Ensure Clear Cans or Servings Per Day:  1       Frequency:  Three Times a day (24 @ 13:44)      CURRENT MEDS:  Neurologic Medications  HYDROmorphone  Injectable 0.25 milliGRAM(s) IV Push every 3 hours PRN Moderate Pain (4 - 6)  HYDROmorphone  Injectable 0.5 milliGRAM(s) IV Push every 3 hours PRN Severe Pain (7 - 10)    Respiratory Medications    Cardiovascular Medications  metoprolol tartrate Injectable 5 milliGRAM(s) IV Push every 6 hours    Gastrointestinal Medications  lactated ringers. 1000 milliLiter(s) IV Continuous <Continuous>    Genitourinary Medications    Hematologic/Oncologic Medications  enoxaparin Injectable 40 milliGRAM(s) SubCutaneous every 24 hours    Antimicrobial/Immunologic Medications    Endocrine/Metabolic Medications  insulin lispro (ADMELOG) corrective regimen sliding scale   SubCutaneous Before meals and at bedtime    Topical/Other Medications  chlorhexidine 2% Cloths 1 Application(s) Topical <User Schedule>      ICU Vital Signs Last 24 Hrs  T(C): 36 (13 Sep 2024 20:00), Max: 36.4 (13 Sep 2024 12:00)  T(F): 96.8 (13 Sep 2024 20:00), Max: 97.5 (13 Sep 2024 12:00)  HR: 68 (14 Sep 2024 07:00) (67 - 94)  BP: 142/67 (14 Sep 2024 07:00) (97/49 - 157/65)  BP(mean): 96 (14 Sep 2024 07:00) (70 - 100)  ABP: --  ABP(mean): --  RR: 16 (14 Sep 2024 07:00) (13 - 29)  SpO2: 96% (14 Sep 2024 07:00) (93% - 100%)    O2 Parameters below as of 14 Sep 2024 07:00  Patient On (Oxygen Delivery Method): room air            Mode: standby    ABG - ( 13 Sep 2024 11:38 )  pH, Arterial: 7.40  pH, Blood: x     /  pCO2: 32    /  pO2: 147   / HCO3: 20    / Base Excess: -4.4  /  SaO2: 100.0               I&O's Summary    13 Sep 2024 07:01  -  14 Sep 2024 07:00  --------------------------------------------------------  IN: 2764.1 mL / OUT: 762 mL / NET: 2002.1 mL      I&O's Detail    13 Sep 2024 07:01  -  14 Sep 2024 07:00  --------------------------------------------------------  IN:    Dexmedetomidine: 14.1 mL    IV PiggyBack: 100 mL    IV PiggyBack: 350 mL    Lactated Ringers: 2300 mL  Total IN: 2764.1 mL    OUT:    Indwelling Catheter - Urethral (mL): 762 mL  Total OUT: 762 mL    Total NET: 2002.1 mL          24 @ 07:01  -  24 @ 07:00  --------------------------------------------------------  IN: 0 mL/kg/d / OUT: 12.1 mL/kg/d / NET: -12.1 mL/kg/d        PHYSICAL EXAM:    General: Pt lying comfortably in bed.     Neuro: Alert & oriented x 2-3, no focal deficits. CNs II-XII grossly intact. PERRLA, spontaneous EOMs. Spontaneously moving all extremities    Lungs: Clear to auscultation bilaterally, normal expansion/effort. Oxygen delivery: room air. Pulling ** on IS.     Cardiovascular : S1, S2.  Regular rate and rhythm. Cardiac Rhythm: NSR  Peripheral edema: pitting edema 2+ noted in bilateral lower extremities.    GI: Abdomen soft, mildly tender, mildly distended.     Wound: Midline incision secured with staples, c/d/i.     Extremities: Extremities warm, pink, well-perfused.        - Pulse exam: DP/PT palpable bilaterally.     Derm: Good skin turgor, no skin breakdown.       - DTI: negative    : Fischer catheter in place/voiding freely.       LABS:  CAPILLARY BLOOD GLUCOSE      POCT Blood Glucose.: 112 mg/dL (13 Sep 2024 23:56)  POCT Blood Glucose.: 116 mg/dL (13 Sep 2024 17:22)  POCT Blood Glucose.: 203 mg/dL (13 Sep 2024 12:31)                          7.5    8.82  )-----------( 158      ( 14 Sep 2024 05:32 )             22.5       -    136  |  105  |  17  ----------------------------<  91  4.2   |  22  |  1.3    Ca    7.7<L>      14 Sep 2024 05:32  Phos  3.8       Mg     2.2         TPro  3.8<L>  /  Alb  2.4<L>  /  TBili  0.7  /  DBili  x   /  AST  31  /  ALT  15  /  AlkPhos  47  09-12            Urinalysis Basic - ( 14 Sep 2024 05:32 )    Color: x / Appearance: x / SG: x / pH: x  Gluc: 91 mg/dL / Ketone: x  / Bili: x / Urobili: x   Blood: x / Protein: x / Nitrite: x   Leuk Esterase: x / RBC: x / WBC x   Sq Epi: x / Non Sq Epi: x / Bacteria: x           ANGEL [facility]   [id number]   49    79yF  ============================================================   DATE OF INITIAL SICU CONSULT: 24    INDICATION FOR SICU CONSULT: remained intubated postoperatively, intraoperative vasopressor support     SICU COURSE EVENTS :   - admitted to SICU service. Given 500cc bolus, albumin 5% 250cc x 2, albumin 25% 100cc x 1   : Given albumin 25% 100cc x 1 for decreased UOP. Therapeutic lovenox started. Patient extubated, later on room air.      24HOUR EVENTS    NIGHT  PM rounds: HR 90s, SBP 130s, midline incision stapled no dressing, abdomen soft, mildly tender.  [id number]: used mcclelland dialect, feeling fine, AAO  - IVL if tolerating clears  - Refused labs, rescheduled for AM    DAY  - SBT: RSBI 34, + cuff leak. ABG 7.37/32/169/18.5, lac 1.5.   - Post extubation AB.40/32/147/20 lac 1.4  - now on room air  - LVX started --> start therapetic AC tomorrow per Gyn  - advanced to clears  - UOP decreased, SBP 90s --> albumin 25% 100mL x 1 --> increase in UOP  -==========================================  Daily Height in cm: 154.9 (13 Sep 2024 11:05)    Daily Weight in k.3 (13 Sep 2024 22:00)    Diet, Clear Liquid:   Consistent Carbohydrate No Snacks (CSTCHO)  Supplement Feeding Modality:  Oral  Ensure Clear Cans or Servings Per Day:  1       Frequency:  Three Times a day (24 @ 13:44)      CURRENT MEDS:  Neurologic Medications  HYDROmorphone  Injectable 0.25 milliGRAM(s) IV Push every 3 hours PRN Moderate Pain (4 - 6)  HYDROmorphone  Injectable 0.5 milliGRAM(s) IV Push every 3 hours PRN Severe Pain (7 - 10)    Respiratory Medications    Cardiovascular Medications  metoprolol tartrate Injectable 5 milliGRAM(s) IV Push every 6 hours    Gastrointestinal Medications  lactated ringers. 1000 milliLiter(s) IV Continuous <Continuous>    Genitourinary Medications    Hematologic/Oncologic Medications  enoxaparin Injectable 40 milliGRAM(s) SubCutaneous every 24 hours    Antimicrobial/Immunologic Medications    Endocrine/Metabolic Medications  insulin lispro (ADMELOG) corrective regimen sliding scale   SubCutaneous Before meals and at bedtime    Topical/Other Medications  chlorhexidine 2% Cloths 1 Application(s) Topical <User Schedule>      ICU Vital Signs Last 24 Hrs  T(C): 36 (13 Sep 2024 20:00), Max: 36.4 (13 Sep 2024 12:00)  T(F): 96.8 (13 Sep 2024 20:00), Max: 97.5 (13 Sep 2024 12:00)  HR: 68 (14 Sep 2024 07:00) (67 - 94)  BP: 142/67 (14 Sep 2024 07:00) (97/49 - 157/65)  BP(mean): 96 (14 Sep 2024 07:00) (70 - 100)  ABP: --  ABP(mean): --  RR: 16 (14 Sep 2024 07:00) (13 - 29)  SpO2: 96% (14 Sep 2024 07:00) (93% - 100%)    O2 Parameters below as of 14 Sep 2024 07:00  Patient On (Oxygen Delivery Method): room air            Mode: standby    ABG - ( 13 Sep 2024 11:38 )  pH, Arterial: 7.40  pH, Blood: x     /  pCO2: 32    /  pO2: 147   / HCO3: 20    / Base Excess: -4.4  /  SaO2: 100.0               I&O's Summary    13 Sep 2024 07:01  -  14 Sep 2024 07:00  --------------------------------------------------------  IN: 2764.1 mL / OUT: 762 mL / NET: 2002.1 mL      I&O's Detail    13 Sep 2024 07:01  -  14 Sep 2024 07:00  --------------------------------------------------------  IN:    Dexmedetomidine: 14.1 mL    IV PiggyBack: 100 mL    IV PiggyBack: 350 mL    Lactated Ringers: 2300 mL  Total IN: 2764.1 mL    OUT:    Indwelling Catheter - Urethral (mL): 762 mL  Total OUT: 762 mL    Total NET: 2002.1 mL          24 @ 07:01  -  24 @ 07:00  --------------------------------------------------------  IN: 0 mL/kg/d / OUT: 12.1 mL/kg/d / NET: -12.1 mL/kg/d        PHYSICAL EXAM:    General: Pt lying comfortably in bed.     Neuro: Alert & oriented x 2-3, no focal deficits. CNs II-XII grossly intact. PERRLA, spontaneous EOMs. Spontaneously moving all extremities    Lungs: Clear to auscultation bilaterally, normal expansion/effort. Oxygen delivery: room air.      Cardiovascular : S1, S2.  Regular rate and rhythm. Cardiac Rhythm: NSR  Peripheral edema: pitting edema 2+ noted in bilateral lower extremities.    GI: Abdomen soft, mildly tender, mildly distended.     Wound: Midline incision secured with staples, c/d/i.     Extremities: Extremities warm, pink, well-perfused.        - Pulse exam: DP/PT palpable bilaterally.     Derm: Good skin turgor, no skin breakdown.       - DTI: negative    : Fischer catheter in place/voiding freely.       LABS:  CAPILLARY BLOOD GLUCOSE      POCT Blood Glucose.: 112 mg/dL (13 Sep 2024 23:56)  POCT Blood Glucose.: 116 mg/dL (13 Sep 2024 17:22)  POCT Blood Glucose.: 203 mg/dL (13 Sep 2024 12:31)                          7.5    8.82  )-----------( 158      ( 14 Sep 2024 05:32 )             22.5       09-14    136  |  105  |  17  ----------------------------<  91  4.2   |  22  |  1.3    Ca    7.7<L>      14 Sep 2024 05:32  Phos  3.8     -  Mg     2.2     -14    TPro  3.8<L>  /  Alb  2.4<L>  /  TBili  0.7  /  DBili  x   /  AST  31  /  ALT  15  /  AlkPhos  47  09-12            Urinalysis Basic - ( 14 Sep 2024 05:32 )    Color: x / Appearance: x / SG: x / pH: x  Gluc: 91 mg/dL / Ketone: x  / Bili: x / Urobili: x   Blood: x / Protein: x / Nitrite: x   Leuk Esterase: x / RBC: x / WBC x   Sq Epi: x / Non Sq Epi: x / Bacteria: x

## 2024-09-14 NOTE — PROGRESS NOTE ADULT - ASSESSMENT
79y Female w/ PMHx of metastatic colon/ovarian cancer now s/p exploratory laparotomy, excision of 25cm pelvic mass, JUSTA/BSO, partial resection and reanastomosis of small bowel and transverse colon on 9/12, complicated by acute hypotension requiring vasopressor support. SICU consulted and pt admitted to SICU for hemodynamic monitoring and mechanical ventilation.    NEURO/PSYCH:  #Acute pain  - acetaminophen IV 1000mg q6h PRN for mild pain (1 - 3)  - HYDROmorphone  Injectable 0.25 milliGRAM(s) IV Push every 3 hours PRN Moderate Pain (4 - 6)  - HYDROmorphone  Injectable 0.5 milliGRAM(s) IV Push every 3 hours PRN Severe Pain (7 - 10)    RESP:   #mechanical intubation - resolved  - extubated to face mask, now on room air  - wean as tolerated    #Activity  - increase as tolerated    Postoperative CXR: OG tube in place, no acute pulmonary pathology    CARDIAC:   #acute hypotension intraoperatively requiring vasopressor support  - hypotensive in OR, placed on levophed gtt, max dose of 0.04 mg/kg/hr. Currently off    #PMHx of HTN, ascending aortic aneurysm found on imaging  - metoprolol succinate 50mg PO --> metoprolol tartrate IV 5mg q6h  - holding hydralazine 50mg q12h  - holding atorvastatin 20mg QD    Imaging:   - EKG: HR 59, sinus bradycardia. QTc 540 --> Mg 2g x 2 doses given    GI/NUTR:   #partial resection and reanastomosis of transverse colon and small bowel  - Gastroenterology: outpatient colonoscopy for colonic mass seen on CT  - Advanced to clears per Gyn, tolerating  - aspiration precautions, HOB 30    #GI Prophylaxis  - famotidine d/c'd after extubation     #Bowel regimen  - holding    /RENAL:   #metabolic acidosis - resolving  - 1 amp bicarb given in OR without change  - ABG 09-13 @ 11:38--7.40 / 32 / 147 / 20 / [id number] 100.0 / Lactate 1.4 / iCal 1.21   #urine output in critically ill  - indwelling kumar (placed 9/12 - )  #IVF  - LR at 100mL/hr    Labs:   BUN/Cr- 12/0.9  -->,  15/1.1  -->    Electrolytes-(09-12 @ 21:35)Na 132 // K 4.5 // Mg 2.6 // Phos 4.9     GYN:   #Metastatic malignancy, 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, now s/p exploratory laparotomy, excision of 25cm pelvic mass, JUSTA/BSO, partial resection and reanastomosis of small bowel and transverse colon on 9/12  - remained intubated postoperatively  - acutely hypotensive intraop, required max levophed gtt 0.04, currently off  - 2u PRBC intraop  - 24h postop cefotetan q8h - complete  - hold DVT PPx for 24h per Gyn --> started 9/13  - f/up further Gyn/onc workup    HEME/ONC:   #acute anemia  - 2u PRBC intraoperatively on 9/12, postop Hgb 11.6 --> 11.4    Labs: Hb/Hct:  11.6/35.1  (09-12 @ 14:12)  -->,  11.4/34.0  (09-12 @ 21:35)  -->                      Plts:  207  -->,  222  -->                 PTT/INR:        #bilateral LE DVTs present on admission, s/p IVC filter (9/11)  - Right lower extremity DVT in peroneal vein and gastrocnemius veins, left lower extremity DVT in gastrocnemius, posterior tibial, and peroneal veins. Pt was on heparin gtt preoperatively.  - Vascular: Consulted for r/o DVTs. Bilateral lower extremity duplex showed Right lower extremity DVT in peroneal vein and gastrocnemius veins and Left lower extremity DVT in gastrocnemius, posterior tibial, and peroneal veins; recommend AC for 3 months and outpatient follow-up.   - IVC filter placed by IR on 9/11  - Mechanical DVT contraindicated due to active DVTs  - start therapeutic AC on 9/14 per Gyn    DVT PPx: lovenox      Labs: Hb/Hct:  11.6/35.1  (09-12 @ 14:12)  -->,  11.4/34.0  (09-12 @ 21:35)  -->                      Plts:  207  -->,  222  -->                 PTT/INR:        T&S:  Expires: 9/14    ID:  #leukopenia likely in the setting of malignancy  WBC- 3.44  --->>,  3.99  --->>,  10.91  --->>  Temp trend- 24hrs T(F): 96.8 (09-13 @ 20:00), Max: 97.5 (09-13 @ 12:00)  #postoperative prophylaxis  Completed antibiotics - cefoTEtan  IVPB 2g q8h for 24h postop - completed    ENDO:  #PMHx of prediabetes  - A1c, 9/6 - 5.4%  - FSG AC/HS  - Glucose goal 140-180  - Insulin sliding scale    MSK:  #no active issues  - Activity - Increase As Tolerated:     DERM:  - DTI screen negative    LINES/DRAINS:  PIV, Kumar Indwelling Urethral Catheter, Arterial Line (9/12 - 9/13), ETT (9/12 - 9/13), OG (9/12 - 9/13)    ADVANCED DIRECTIVES:      HCP/Emergency Contact - silverio Thompson. [phone]    INDICATION FOR SICU: Intra-abdominal and pelvic swelling of mass or lump    DISPO:   SICU    Case discussed with attending Dr. Leonardo   79y Female w/ PMHx of metastatic colon/ovarian cancer now s/p exploratory laparotomy, excision of 25cm pelvic mass, JUSTA/BSO, partial resection and reanastomosis of small bowel and transverse colon on 9/12, complicated by acute hypotension requiring vasopressor support. SICU consulted and pt admitted to SICU for hemodynamic monitoring and mechanical ventilation.    NEURO/PSYCH:  #Acute pain  - acetaminophen PO 975mg q6  - HYDROmorphone  Injectable 0.25 milliGRAM(s) IV Push every 3 hours PRN Moderate Pain (4 - 6)  - HYDROmorphone  Injectable 0.5 milliGRAM(s) IV Push every 3 hours PRN Severe Pain (7 - 10)    RESP:   #mechanical intubation - resolved  - extubated to face mask, now on room air  - wean as tolerated    #Activity  - increase as tolerated    Postoperative CXR: OG tube in place, no acute pulmonary pathology    CARDIAC:   #acute hypotension intraoperatively requiring vasopressor support  - hypotensive in OR, placed on levophed gtt, max dose of 0.04 mg/kg/hr. Currently off    #PMHx of HTN, ascending aortic aneurysm found on imaging  - metoprolol succinate 50mg PO --> metoprolol tartrate IV 5mg q6h  - holding hydralazine 50mg q12h  - holding atorvastatin 20mg QD    Imaging:   - EKG: HR 59, sinus bradycardia. QTc 540 --> Mg 2g x 2 doses given    GI/NUTR:   #partial resection and reanastomosis of transverse colon and small bowel  - Gastroenterology: outpatient colonoscopy for colonic mass seen on CT  - Advanced to clears per Gyn, tolerating  - aspiration precautions, HOB 30    #GI Prophylaxis  - famotidine d/c'd after extubation     #Bowel regimen  - holding    /RENAL:   #metabolic acidosis - resolving  - 1 amp bicarb given in OR without change  - ABG 09-13 @ 11:38--7.40 / 32 / 147 / 20 / [id number] 100.0 / Lactate 1.4 / iCal 1.21   #urine output in critically ill  - indwelling kumar (placed 9/12 - )  #IVF  - LR at 100mL/hr    Labs:   BUN/Cr- 12/0.9  -->,  15/1.1  -->    Electrolytes-(09-12 @ 21:35)Na 132 // K 4.5 // Mg 2.6 // Phos 4.9     GYN:   #Metastatic malignancy, 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, now s/p exploratory laparotomy, excision of 25cm pelvic mass, JUSTA/BSO, partial resection and reanastomosis of small bowel and transverse colon on 9/12  - remained intubated postoperatively  - acutely hypotensive intraop, required max levophed gtt 0.04, currently off  - 2u PRBC intraop  - 24h postop cefotetan q8h - complete  - hold DVT PPx for 24h per Gyn --> started 9/13  - f/up further Gyn/onc workup    HEME/ONC:   #acute anemia  - 2u PRBC intraoperatively on 9/12, postop Hgb 11.6 --> 11.4    Labs: Hb/Hct:  11.6/35.1  (09-12 @ 14:12)  -->,  11.4/34.0  (09-12 @ 21:35)  -->                      Plts:  207  -->,  222  -->                 PTT/INR:        #bilateral LE DVTs present on admission, s/p IVC filter (9/11)  - Right lower extremity DVT in peroneal vein and gastrocnemius veins, left lower extremity DVT in gastrocnemius, posterior tibial, and peroneal veins. Pt was on heparin gtt preoperatively.  - Vascular: Consulted for r/o DVTs. Bilateral lower extremity duplex showed Right lower extremity DVT in peroneal vein and gastrocnemius veins and Left lower extremity DVT in gastrocnemius, posterior tibial, and peroneal veins; recommend AC for 3 months and outpatient follow-up.   - IVC filter placed by IR on 9/11  - Mechanical DVT contraindicated due to active DVTs  - start therapeutic AC on 9/14 per Gyn    DVT PPx: lovenox      Labs: Hb/Hct:  11.6/35.1  (09-12 @ 14:12)  -->,  11.4/34.0  (09-12 @ 21:35)  -->                      Plts:  207  -->,  222  -->                 PTT/INR:        T&S:  Expires: 9/14    ID:  #leukopenia likely in the setting of malignancy  WBC- 3.44  --->>,  3.99  --->>,  10.91  --->>  Temp trend- 24hrs T(F): 96.8 (09-13 @ 20:00), Max: 97.5 (09-13 @ 12:00)  #postoperative prophylaxis  Completed antibiotics - cefoTEtan  IVPB 2g q8h for 24h postop - completed    ENDO:  #PMHx of prediabetes  - A1c, 9/6 - 5.4%  - FSG AC/HS  - Glucose goal 140-180  - Insulin sliding scale    MSK:  #no active issues  - Activity - Increase As Tolerated:     DERM:  - DTI screen negative    LINES/DRAINS:  PIV, Kumar Indwelling Urethral Catheter, Arterial Line (9/12 - 9/13), ETT (9/12 - 9/13), OG (9/12 - 9/13)    ADVANCED DIRECTIVES:      HCP/Emergency Contact - silverio Thompson. [phone]    INDICATION FOR SICU: Intra-abdominal and pelvic swelling of mass or lump    DISPO:   SICU    Case discussed with attending Dr. Leonardo

## 2024-09-14 NOTE — PROGRESS NOTE ADULT - SUBJECTIVE AND OBJECTIVE BOX
GENERAL SURGERY PROGRESS NOTE     SKYLAR ORTIZ  [address] [place] [address] day :11d    POD:2d  Procedure: Exploratory laparotomy by gynecology    JUSTA & BSO    Omentectomy    Small bowel resection    Resection, transverse colon    Appendectomy    Debulking of pelvic tumor      Surgical Attending: Junior Morris  Overnight events: No acute events overnight. Midline incision with staples in place, no evidence of drainage or bleeding. SHe is tolerating a clear liquid diet without any nausea or vomiting.     T(F): 96.8 (09-13-24 @ 20:00), Max: 97.5 (09-13-24 @ 12:00)  HR: 78 (09-14-24 @ 00:00) (60 - 94)  BP: 128/61 (09-14-24 @ 00:00) (92/54 - 153/70)  ABP: 125/44 (09-13-24 @ 07:00) (101/53 - 125/44)  ABP(mean): 68 (09-13-24 @ 07:00) (61 - 68)  RR: 19 (09-14-24 @ 00:00) (13 - 29)  SpO2: 97% (09-14-24 @ 00:00) (96% - 100%)    IN'S / OUT's:    09-12-24 @ 07:01  -  09-13-24 @ 07:00  --------------------------------------------------------  IN:    Dexmedetomidine: 41 mL    IV PiggyBack: 150 mL    Lactated Ringers: 1700 mL    Lactated Ringers Bolus: 500 mL  Total IN: 2391 mL    OUT:    Indwelling Catheter - Urethral (mL): 590 mL    Nasogastric/Oral tube (mL): 20 mL  Total OUT: 610 mL    Total NET: 1781 mL      09-13-24 @ 07:01  -  09-14-24 @ 00:59  --------------------------------------------------------  IN:    Dexmedetomidine: 14.1 mL    IV PiggyBack: 100 mL    IV PiggyBack: 350 mL    Lactated Ringers: 1600 mL  Total IN: 2064.1 mL    OUT:    Indwelling Catheter - Urethral (mL): 362 mL  Total OUT: 362 mL    Total NET: 1702.1 mL          PHYSICAL EXAM:  GENERAL: NAD  CHEST/LUNG: equal chest rise b/l  HEART: Regular rate and rhythm  ABDOMEN: Soft, Nontender, Nondistended; Midline incision  C/D/I, with no bleeding or drainage  EXTREMITIES:  No clubbing, cyanosis, or edema      LABS  Labs:  CAPILLARY BLOOD GLUCOSE      POCT Blood Glucose.: 112 mg/dL (13 Sep 2024 23:56)  POCT Blood Glucose.: 116 mg/dL (13 Sep 2024 17:22)  POCT Blood Glucose.: 203 mg/dL (13 Sep 2024 12:31)  POCT Blood Glucose.: 211 mg/dL (13 Sep 2024 06:19)                          11.4   10.91 )-----------( 222      ( 12 Sep 2024 21:35 )             34.0         09-12    132<L>  |  102  |  15  ----------------------------<  210<H>  4.5   |  14<L>  |  1.1          LFTs:             3.8  | 0.7  | 31       ------------------[47      ( 12 Sep 2024 14:12 )  2.4  | x    | 15          Lipase:x      Amylase:x         Blood Gas Arterial, Lactate: 1.4 mmol/L (09-13-24 @ 11:38)  Blood Gas Arterial, Lactate: 1.5 mmol/L (09-13-24 @ 09:56)  Blood Gas Arterial, Lactate: 3.4 mmol/L (09-13-24 @ 05:03)  Blood Gas Arterial, Lactate: 2.8 mmol/L (09-12-24 @ 23:14)  Blood Gas Arterial, Lactate: 2.9 mmol/L (09-12-24 @ 18:39)  Blood Gas Arterial, Lactate: 1.8 mmol/L (09-12-24 @ 14:07)    ABG - ( 13 Sep 2024 11:38 )  pH: 7.40  /  pCO2: 32    /  pO2: 147   / HCO3: 20    / Base Excess: -4.4  /  SaO2: 100.0           ABG - ( 13 Sep 2024 09:56 )  pH: 7.37  /  pCO2: 32    /  pO2: 169   / HCO3: 18    / Base Excess: -6.1  /  SaO2: 99.8            ABG - ( 13 Sep 2024 05:03 )  pH: 7.37  /  pCO2: 29    /  pO2: 139   / HCO3: 17    / Base Excess: -7.6  /  SaO2: 99.6              Coags:            Urinalysis Basic - ( 12 Sep 2024 21:35 )    Color: x / Appearance: x / SG: x / pH: x  Gluc: 210 mg/dL / Ketone: x  / Bili: x / Urobili: x   Blood: x / Protein: x / Nitrite: x   Leuk Esterase: x / RBC: x / WBC x   Sq Epi: x / Non Sq Epi: x / Bacteria: x            RADIOLOGY & ADDITIONAL TESTS:      A/P:  SKYLAR ORTIZ is a79F s/p ExLap, JUSTA, BSO, omentectomy, appendectomy, small bowel resection+reanastomosis, transverse colon resection+reanastomosis, tumor debulking with General surgery intra-op consult for segmental transverse colectomy with anastomosis on 9/12.        PLAN:   - hemodynamic monitoring  - continue to monitor respiratory status  - continue CLD, advance as tolerated   - daily labs, replete electrolytes as needed   - multi modal pain control  - rest of care per OBGYN/SICU        #DVT ppx: enoxaparin Injectable 40 milliGRAM(s) SubCutaneous every 24 hours    Disposition:  SICU    Above plan to be discussed with Attending Surgeon Dr. Shi , patient, patient family, and rest of health care team    Blue [id number]

## 2024-09-14 NOTE — PROGRESS NOTE ADULT - SUBJECTIVE AND OBJECTIVE BOX
[facility]  #[id number]  PGY 3 Note    Patient examined at bedside, pain moderately controlled on PO/IV medications. Denies fevers/chills, HA/N/V, CP/SOB/palpitations, abdominal pain, vaginal bleeding, hematuria/dysuria, constipation/diarrhea. Tolerating clear diet, no flatus. Not yet ambulating.    T(F): 96.8 (09-13-24 @ 20:00), Max: 97.5 (09-13-24 @ 12:00)  HR: 68 (09-14-24 @ 07:00) (67 - 94)  BP: 142/67 (09-14-24 @ 07:00) (97/49 - 157/65)  RR: 16 (09-14-24 @ 07:00) (13 - 29)  SpO2: 96% (09-14-24 @ 07:00) (93% - 100%)    POCT Blood Glucose.: 99 mg/dL (14 Sep 2024 07:40)  POCT Blood Glucose.: 112 mg/dL (13 Sep 2024 23:56)  POCT Blood Glucose.: 116 mg/dL (13 Sep 2024 17:22)  POCT Blood Glucose.: 203 mg/dL (13 Sep 2024 12:31)      Physical Exam:  General: AAOx3. NAD  Abdomen: soft, non-tender, non-distended, +BSx4  Incision: vertical midline incision C/D/I with staples in place, no erythema, no draining  VE: deferred, no bleeding on pad/chux  Ext: No edema    Labs:             7.5<L>  8.82  )-----------( 158      ( 09-14 @ 05:32 )             22.5<L>               11.4<L>  10.91<H> )-----------( 222      ( 09-12 @ 21:35 )             34.0<L>               11.6<L>  3.99<L> )-----------( 207      ( 09-12 @ 14:12 )             35.1<L>               8.6<L>  3.44<L> )-----------( 233      ( 09-11 @ 06:17 )             26.0<L>               9.3<L>  3.16<L> )-----------( 233      ( 09-10 @ 08:29 )             29.1<L>     09-14    136  |  105  |  17  ----------------------------<  91  4.2   |  22  |  1.3    Ca    7.7<L>      14 Sep 2024 05:32  Phos  3.8     09-14  Mg     2.2     09-14    TPro  3.8<L>  /  Alb  2.4<L>  /  TBili  0.7  /  DBili  x   /  AST  31  /  ALT  15  /  AlkPhos  47  09-12            Trend:             7.5<L>  8.82  )-----------( 158      ( 09-14 @ 05:32 )             22.5<L>               11.4<L>  10.91<H> )-----------( 222      ( 09-12 @ 21:35 )             34.0<L>               11.6<L>  3.99<L> )-----------( 207      ( 09-12 @ 14:12 )             35.1<L>               8.6<L>  3.44<L> )-----------( 233      ( 09-11 @ 06:17 )             26.0<L>               9.3<L>  3.16<L> )-----------( 233      ( 09-10 @ 08:29 )             29.1<L>        Creatinine: 1.3 (09-14)  Creatinine: 1.1 (09-12)  Creatinine: 0.9 (09-12)  Creatinine: 1.1 (09-11)  Creatinine: 1.0 (09-10)  Creatinine: 1.1 (09-09)  Creatinine: 1.2 (09-09)  Creatinine: 1.1 (09-08)  Creatinine: 1.2 (09-07)  Creatinine: 1.1 (09-06)  Creatinine: 1.0 (09-05)  Creatinine: 1.1 (09-04)      Medications:  MEDICATIONS  (STANDING):  chlorhexidine 2% Cloths 1 Application(s) Topical <User Schedule>  enoxaparin Injectable 40 milliGRAM(s) SubCutaneous every 24 hours  insulin lispro (ADMELOG) corrective regimen sliding scale   SubCutaneous Before meals and at bedtime  lactated ringers. 1000 milliLiter(s) (100 mL/Hr) IV Continuous <Continuous>  metoprolol tartrate Injectable 5 milliGRAM(s) IV Push every 6 hours    MEDICATIONS  (PRN):  HYDROmorphone  Injectable 0.25 milliGRAM(s) IV Push every 3 hours PRN Moderate Pain (4 - 6)  HYDROmorphone  Injectable 0.5 milliGRAM(s) IV Push every 3 hours PRN Severe Pain (7 - 10)       [facility]  #[id number]  PGY 4 Note    Patient examined at bedside, pain moderately controlled on PO/IV medications. Denies fevers/chills, HA/N/V, CP/SOB/palpitations, abdominal pain, vaginal bleeding, hematuria/dysuria, constipation/diarrhea. Tolerating clear diet, no flatus. Not yet ambulating.    T(F): 96.8 (09-13-24 @ 20:00), Max: 97.5 (09-13-24 @ 12:00)  HR: 68 (09-14-24 @ 07:00) (67 - 94)  BP: 142/67 (09-14-24 @ 07:00) (97/49 - 157/65)  RR: 16 (09-14-24 @ 07:00) (13 - 29)  SpO2: 96% (09-14-24 @ 07:00) (93% - 100%)    POCT Blood Glucose.: 99 mg/dL (14 Sep 2024 07:40)  POCT Blood Glucose.: 112 mg/dL (13 Sep 2024 23:56)  POCT Blood Glucose.: 116 mg/dL (13 Sep 2024 17:22)  POCT Blood Glucose.: 203 mg/dL (13 Sep 2024 12:31)      Physical Exam:  General: AAOx3. NAD  Abdomen: soft, non-tender, non-distended, +BSx4  Incision: vertical midline incision C/D/I with staples in place, no erythema, no draining  VE: deferred, no bleeding on pad/chux  Ext: No edema    Labs:             7.5<L>  8.82  )-----------( 158      ( 09-14 @ 05:32 )             22.5<L>               11.4<L>  10.91<H> )-----------( 222      ( 09-12 @ 21:35 )             34.0<L>               11.6<L>  3.99<L> )-----------( 207      ( 09-12 @ 14:12 )             35.1<L>               8.6<L>  3.44<L> )-----------( 233      ( 09-11 @ 06:17 )             26.0<L>               9.3<L>  3.16<L> )-----------( 233      ( 09-10 @ 08:29 )             29.1<L>     09-14    136  |  105  |  17  ----------------------------<  91  4.2   |  22  |  1.3    Ca    7.7<L>      14 Sep 2024 05:32  Phos  3.8     09-14  Mg     2.2     09-14    TPro  3.8<L>  /  Alb  2.4<L>  /  TBili  0.7  /  DBili  x   /  AST  31  /  ALT  15  /  AlkPhos  47  09-12            Trend:             7.5<L>  8.82  )-----------( 158      ( 09-14 @ 05:32 )             22.5<L>               11.4<L>  10.91<H> )-----------( 222      ( 09-12 @ 21:35 )             34.0<L>               11.6<L>  3.99<L> )-----------( 207      ( 09-12 @ 14:12 )             35.1<L>               8.6<L>  3.44<L> )-----------( 233      ( 09-11 @ 06:17 )             26.0<L>               9.3<L>  3.16<L> )-----------( 233      ( 09-10 @ 08:29 )             29.1<L>        Creatinine: 1.3 (09-14)  Creatinine: 1.1 (09-12)  Creatinine: 0.9 (09-12)  Creatinine: 1.1 (09-11)  Creatinine: 1.0 (09-10)  Creatinine: 1.1 (09-09)  Creatinine: 1.2 (09-09)  Creatinine: 1.1 (09-08)  Creatinine: 1.2 (09-07)  Creatinine: 1.1 (09-06)  Creatinine: 1.0 (09-05)  Creatinine: 1.1 (09-04)      Medications:  MEDICATIONS  (STANDING):  chlorhexidine 2% Cloths 1 Application(s) Topical <User Schedule>  enoxaparin Injectable 40 milliGRAM(s) SubCutaneous every 24 hours  insulin lispro (ADMELOG) corrective regimen sliding scale   SubCutaneous Before meals and at bedtime  lactated ringers. 1000 milliLiter(s) (100 mL/Hr) IV Continuous <Continuous>  metoprolol tartrate Injectable 5 milliGRAM(s) IV Push every 6 hours    MEDICATIONS  (PRN):  HYDROmorphone  Injectable 0.25 milliGRAM(s) IV Push every 3 hours PRN Moderate Pain (4 - 6)  HYDROmorphone  Injectable 0.5 milliGRAM(s) IV Push every 3 hours PRN Severe Pain (7 - 10)

## 2024-09-14 NOTE — CHART NOTE - NSCHARTNOTEFT_GEN_A_CORE
SICU Transfer Note    SKYLAR ORTIZ  79y (1945)  [id number]      Transfer from: SICU  Transfer to: Surgery-      SICU COURSE:  79y Female HD# 10d    s/p Exploratory laparotomy by gynecology    JUSTA & BSO    Omentectomy    Small bowel resection    Resection, transverse colon    Appendectomy    Debulking of pelvic tumor        PAST MEDICAL & SURGICAL HISTORY:    Allergies    No Known Allergies    Intolerances      MEDICATIONS  (STANDING):  acetaminophen     Tablet .. 975 milliGRAM(s) Oral every 6 hours  apixaban 5 milliGRAM(s) Oral every 12 hours  chlorhexidine 2% Cloths 1 Application(s) Topical <User Schedule>  dextrose 5% + sodium chloride 0.45%. 1000 milliLiter(s) (75 mL/Hr) IV Continuous <Continuous>  hydrALAZINE 25 milliGRAM(s) Oral two times a day  insulin lispro (ADMELOG) corrective regimen sliding scale   SubCutaneous Before meals and at bedtime  metoprolol tartrate 25 milliGRAM(s) Oral two times a day    MEDICATIONS  (PRN):  oxyCODONE    IR 2.5 milliGRAM(s) Oral every 4 hours PRN Moderate Pain (4 - 6)  oxyCODONE    IR 5 milliGRAM(s) Oral every 4 hours PRN Severe Pain (7 - 10)      Vital Signs Last 24 Hrs  T(C): 36.6 (14 Sep 2024 12:00), Max: 36.6 (14 Sep 2024 08:00)  T(F): 97.8 (14 Sep 2024 12:00), Max: 97.8 (14 Sep 2024 08:00)  HR: 80 (14 Sep 2024 14:09) (67 - 94)  BP: 167/83 (14 Sep 2024 14:09) (102/54 - 188/95)  BP(mean): 118 (14 Sep 2024 14:09) (74 - 130)  RR: 25 (14 Sep 2024 14:09) (13 - 29)  SpO2: 96% (14 Sep 2024 14:09) (93% - 98%)    Parameters below as of 14 Sep 2024 08:00  Patient On (Oxygen Delivery Method): room air      I&O's Summary    13 Sep 2024 07:01  -  14 Sep 2024 07:00  --------------------------------------------------------  IN: 2764.1 mL / OUT: 762 mL / NET: 2002.1 mL    14 Sep 2024 07:01  -  14 Sep 2024 15:25  --------------------------------------------------------  IN: 575 mL / OUT: 340 mL / NET: 235 mL        LABS  LABS:                        7.7    9.27  )-----------( 161      ( 14 Sep 2024 11:40 )             22.9       09-14    136  |  105  |  17  ----------------------------<  91  4.2   |  22  |  1.3    Ca    7.7<L>      14 Sep 2024 05:32  Phos  3.8     09-14  Mg     2.2     09-14                                    Assessment & Plan:          Follow Up:  - 20:00 labs  - trial of void at 1930  - monitor Hgb    Signed out to:  Date:  Time: SICU Transfer Note    SKYLAR ORTIZ  79y (1945)  [id number]      Transfer from: SICU  Transfer to: Surgery-Gyn Onc      SICU COURSE:  79-year-old female with unclear PMHx presented to ED on 9/4/24 for right groin pain with radiation down anterior leg and constipation x 2 days. Patient arrived from [facility] 1 week prior, family noticed decreased appetite and her complaints of right groin pain worsened when walking and decreased bowel movements, despite utilizing prune juice and increasing fluids. Tylenol occasionally improves pain. Patient was seen by her primary Dr. Jefferson last night and directed to the emergency department. Patient's daughter Queta notes patient underwent a recent surgery to her right colon and [facility], unsure what was done or what was removed but is concerned that her constipation and new pain could be related to the surgery. As per primary Dr. Jefferson, pt was diagnosed with metastatic cancer in [facility], possibly colon as primary. Was informed needed to start chemo, however there was no chemo available there at the time.    In the ED, pt was hypertensive to SBP to 170s, afebrile. CT A/P showed 25cm pelvic mass, multiple cystic lesions, and colonic mass concerning for malignancy, bilateral common femoral vein filling defects, bilateral mild hydronephrosis, and incidental findings of ascending aortic aneurysm, calcified splenic artery aneurysm, and small right pleural effusion. Bilateral lower extremity duplex showed Right lower extremity DVT in peroneal vein and gastrocnemius veins and Left lower extremity DVT in gastrocnemius, posterior tibial, and peroneal veins. Pt was admitted to medicine for further workup, see specialty consults below.     Consults:   Interventional radiology: Consulted for biopsy of pelvic mass, recommended against percutaneous biopsy due to possible spilling of neoplastic fluid into abdomen. Additionally consulted for and recommended preoperative IVC filter for bilateral DVTs.  Gynecological oncology: consulted for pelvic mass, recommended TVUS, tumor markers. Recommended resection of pelvic mass. Surgical oncology consulted for assistance of pelvic mass resection.   Gastroenterology: outpatient colonoscopy for colonic mass seen on CT  Vascular: Consulted for r/o DVTs. Bilateral lower extremity duplex showed Right lower extremity DVT in peroneal vein and gastrocnemius veins and Left lower extremity DVT in gastrocnemius, posterior tibial, and peroneal veins; recommend AC for 3 months and outpatient follow-up.     Pt underwent an Exploratory laparotomy, JUSTA & BSO, Omentectomy, Small bowel resection and reanastomosis, transverse colon resection and reanastomosis Appendectomy, and Debulking of pelvic tumor on 9/12 with gynecological oncology and surgical oncology. Case complicated by intraoperative hypotension requiring vasopressor support and metabolic acidosis. SICU consulted for hemodynamic monitoring and management of mechanical ventilation, approved for SICU by Dr. Leonardo.     In the SICU, pt remained off vasopressors for duration of stay. Pt had 1 episode of hypotension on POD0, resolved with crystalloids and albumin. Pt remained intubated postop due to metabolic acidosis, resolved by POD1 and was extubated to face mask, now on room air. Pt's lactate elevated postop to 3.4, resolved with fluids. Pt's Hgb dropped from postop labs 11.4 to 7.5, now stable at 7.7. Pt's intraop oliguria improved over SICU stay with fluid resuscitation. Pt was started on DVT PPx on POD1, now on therapeutic Eliquis for bilateral lower extremity DVTs found on admission. Pt became progressively hypertensive to SBP 180s, was restarted on home hydralazine. Pt was advanced to clear liquid diet on POD1, then full liquids. Pt has not passed gas, not complaining of nausea or vomiting. Pt's kumar was removed POD2, pending trial of void. Pt is downgraded to 4C.       PAST MEDICAL & SURGICAL HISTORY:    Allergies    No Known Allergies    Intolerances      MEDICATIONS  (STANDING):  acetaminophen     Tablet .. 975 milliGRAM(s) Oral every 6 hours  apixaban 5 milliGRAM(s) Oral every 12 hours  chlorhexidine 2% Cloths 1 Application(s) Topical <User Schedule>  dextrose 5% + sodium chloride 0.45%. 1000 milliLiter(s) (75 mL/Hr) IV Continuous <Continuous>  hydrALAZINE 25 milliGRAM(s) Oral two times a day  insulin lispro (ADMELOG) corrective regimen sliding scale   SubCutaneous Before meals and at bedtime  metoprolol tartrate 25 milliGRAM(s) Oral two times a day    MEDICATIONS  (PRN):  oxyCODONE    IR 2.5 milliGRAM(s) Oral every 4 hours PRN Moderate Pain (4 - 6)  oxyCODONE    IR 5 milliGRAM(s) Oral every 4 hours PRN Severe Pain (7 - 10)      Vital Signs Last 24 Hrs  T(C): 36.6 (14 Sep 2024 12:00), Max: 36.6 (14 Sep 2024 08:00)  T(F): 97.8 (14 Sep 2024 12:00), Max: 97.8 (14 Sep 2024 08:00)  HR: 80 (14 Sep 2024 14:09) (67 - 94)  BP: 167/83 (14 Sep 2024 14:09) (102/54 - 188/95)  BP(mean): 118 (14 Sep 2024 14:09) (74 - 130)  RR: 25 (14 Sep 2024 14:09) (13 - 29)  SpO2: 96% (14 Sep 2024 14:09) (93% - 98%)    Parameters below as of 14 Sep 2024 08:00  Patient On (Oxygen Delivery Method): room air      I&O's Summary    13 Sep 2024 07:01  -  14 Sep 2024 07:00  --------------------------------------------------------  IN: 2764.1 mL / OUT: 762 mL / NET: 2002.1 mL    14 Sep 2024 07:01  -  14 Sep 2024 15:25  --------------------------------------------------------  IN: 575 mL / OUT: 340 mL / NET: 235 mL        LABS  LABS:                        7.7    9.27  )-----------( 161      ( 14 Sep 2024 11:40 )             22.9       09-14    136  |  105  |  17  ----------------------------<  91  4.2   |  22  |  1.3    Ca    7.7<L>      14 Sep 2024 05:32  Phos  3.8     09-14  Mg     2.2     09-14                                    Assessment & Plan:  Assessment and plan:  79y Female w/ PMHx of metastatic colon/ovarian cancer now s/p exploratory laparotomy, excision of 25cm pelvic mass, JUSTA/BSO, partial resection and reanastomosis of small bowel and transverse colon on 9/12, complicated by acute hypotension requiring vasopressor support. SICU consulted and pt admitted to SICU for hemodynamic monitoring and mechanical ventilation.    NEURO/PSYCH:  #Acute pain  - acetaminophen IV 1000mg q6h PRN for mild pain (1 - 3)  - HYDROmorphone  Injectable 0.25 milliGRAM(s) IV Push every 3 hours PRN Moderate Pain (4 - 6)  - HYDROmorphone  Injectable 0.5 milliGRAM(s) IV Push every 3 hours PRN Severe Pain (7 - 10)    RESP:   #mechanical intubation - resolved  - extubated to face mask, now on room air  - wean as tolerated    #Activity  - increase as tolerated    Postoperative CXR: OG tube in place, no acute pulmonary pathology    CARDIAC:   #acute hypotension intraoperatively requiring vasopressor support  - hypotensive in OR, placed on levophed gtt, max dose of 0.04 mg/kg/hr. Currently off    #PMHx of HTN, ascending aortic aneurysm found on imaging  - metoprolol succinate 50mg PO --> metoprolol tartrate IV 5mg q6h  - holding hydralazine 50mg q12h  - holding atorvastatin 20mg QD    Imaging:   - EKG: HR 59, sinus bradycardia. QTc 540 --> Mg 2g x 2 doses given    GI/NUTR:   #partial resection and reanastomosis of transverse colon and small bowel  - Gastroenterology: outpatient colonoscopy for colonic mass seen on CT  - Advanced to clears per Gyn, tolerating  - aspiration precautions, HOB 30    #GI Prophylaxis  - famotidine d/c'd after extubation     #Bowel regimen  - holding    /RENAL:   #metabolic acidosis - resolving  - 1 amp bicarb given in OR without change  - ABG 09-13 @ 11:38--7.40 / 32 / 147 / 20 / [id number] 100.0 / Lactate 1.4 / iCal 1.21   #urine output in critically ill  - indwelling kumar (placed 9/12 - )  #IVF  - LR at 100mL/hr    Labs:   BUN/Cr- 12/0.9  -->,  15/1.1  -->    Electrolytes-(09-12 @ 21:35)Na 132 // K 4.5 // Mg 2.6 // Phos 4.9     GYN:   #Metastatic malignancy, 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, now s/p exploratory laparotomy, excision of 25cm pelvic mass, JUSTA/BSO, partial resection and reanastomosis of small bowel and transverse colon on 9/12  - remained intubated postoperatively  - acutely hypotensive intraop, required max levophed gtt 0.04, currently off  - 2u PRBC intraop  - 24h postop cefotetan q8h - complete  - hold DVT PPx for 24h per Gyn --> started 9/13  - f/up further Gyn/onc workup    HEME/ONC:   #acute anemia  - 2u PRBC intraoperatively on 9/12, postop Hgb 11.6 --> 11.4    Labs: Hb/Hct:  11.6/35.1  (09-12 @ 14:12)  -->,  11.4/34.0  (09-12 @ 21:35)  -->                      Plts:  207  -->,  222  -->                 PTT/INR:        #bilateral LE DVTs present on admission, s/p IVC filter (9/11)  - Right lower extremity DVT in peroneal vein and gastrocnemius veins, left lower extremity DVT in gastrocnemius, posterior tibial, and peroneal veins. Pt was on heparin gtt preoperatively.  - Vascular: Consulted for r/o DVTs. Bilateral lower extremity duplex showed Right lower extremity DVT in peroneal vein and gastrocnemius veins and Left lower extremity DVT in gastrocnemius, posterior tibial, and peroneal veins; recommend AC for 3 months and outpatient follow-up.   - IVC filter placed by IR on 9/11  - Mechanical DVT contraindicated due to active DVTs  - start therapeutic AC on 9/14 per Gyn    DVT PPx: lovenox      Labs: Hb/Hct:  11.6/35.1  (09-12 @ 14:12)  -->,  11.4/34.0  (09-12 @ 21:35)  -->                      Plts:  207  -->,  222  -->                 PTT/INR:        T&S:  Expires: 9/14    ID:  #leukopenia likely in the setting of malignancy  WBC- 3.44  --->>,  3.99  --->>,  10.91  --->>  Temp trend- 24hrs T(F): 96.8 (09-13 @ 20:00), Max: 97.5 (09-13 @ 12:00)  #postoperative prophylaxis  Completed antibiotics - cefoTEtan  IVPB 2g q8h for 24h postop - completed    ENDO:  #PMHx of prediabetes  - A1c, 9/6 - 5.4%  - FSG AC/HS  - Glucose goal 140-180  - Insulin sliding scale    MSK:  #no active issues  - Activity - Increase As Tolerated:     DERM:  - DTI screen negative    LINES/DRAINS:  PIV, Kumar Indwelling Urethral Catheter, Arterial Line (9/12 - 9/13), ETT (9/12 - 9/13), OG (9/12 - 9/13)    ADVANCED DIRECTIVES:      HCP/Emergency Contact - silverio Thompson. [phone]    INDICATION FOR SICU: Intra-abdominal and pelvic swelling of mass or lump    DISPO:   SICU    Case discussed with attending Dr. Leonardo                Follow Up:  - 20:00 labs  - trial of void at 1930  - monitor Hgb    Signed out to:  Date:  Time: SICU Transfer Note    SKYLAR ORTIZ  79y (1945)  [id number]      Transfer from: SICU  Transfer to: Surgery-Gyn Onc      SICU COURSE:  79-year-old female with unclear PMHx presented to ED on 9/4/24 for right groin pain with radiation down anterior leg and constipation x 2 days. Patient arrived from [facility] 1 week prior, family noticed decreased appetite and her complaints of right groin pain worsened when walking and decreased bowel movements, despite utilizing prune juice and increasing fluids. Tylenol occasionally improves pain. Patient was seen by her primary Dr. Jefferson last night and directed to the emergency department. Patient's daughter Queta notes patient underwent a recent surgery to her right colon and [facility], unsure what was done or what was removed but is concerned that her constipation and new pain could be related to the surgery. As per primary Dr. Jefferson, pt was diagnosed with metastatic cancer in [facility], possibly colon as primary. Was informed needed to start chemo, however there was no chemo available there at the time.    In the ED, pt was hypertensive to SBP to 170s, afebrile. CT A/P showed 25cm pelvic mass, multiple cystic lesions, and colonic mass concerning for malignancy, bilateral common femoral vein filling defects, bilateral mild hydronephrosis, and incidental findings of ascending aortic aneurysm, calcified splenic artery aneurysm, and small right pleural effusion. Bilateral lower extremity duplex showed Right lower extremity DVT in peroneal vein and gastrocnemius veins and Left lower extremity DVT in gastrocnemius, posterior tibial, and peroneal veins. Pt was admitted to medicine for further workup, see specialty consults below.     Consults:   Interventional radiology: Consulted for biopsy of pelvic mass, recommended against percutaneous biopsy due to possible spilling of neoplastic fluid into abdomen. Additionally consulted for and recommended preoperative IVC filter for bilateral DVTs.  Gynecological oncology: consulted for pelvic mass, recommended TVUS, tumor markers. Recommended resection of pelvic mass. Surgical oncology consulted for assistance of pelvic mass resection.   Gastroenterology: outpatient colonoscopy for colonic mass seen on CT  Vascular: Consulted for r/o DVTs. Bilateral lower extremity duplex showed Right lower extremity DVT in peroneal vein and gastrocnemius veins and Left lower extremity DVT in gastrocnemius, posterior tibial, and peroneal veins; recommend AC for 3 months and outpatient follow-up.     Pt underwent an Exploratory laparotomy, JUSTA & BSO, Omentectomy, Small bowel resection and reanastomosis, transverse colon resection and reanastomosis Appendectomy, and Debulking of pelvic tumor on 9/12 with gynecological oncology and surgical oncology. Case complicated by intraoperative hypotension requiring vasopressor support and metabolic acidosis. SICU consulted for hemodynamic monitoring and management of mechanical ventilation, approved for SICU by Dr. Leonardo.     In the SICU, pt remained off vasopressors for duration of stay. Pt had 1 episode of hypotension on POD0, resolved with crystalloids and albumin. Pt remained intubated postop due to metabolic acidosis, resolved by POD1 and was extubated to face mask, now on room air. Pt's lactate elevated postop to 3.4, resolved with fluids. Pt's Hgb dropped from postop labs 11.4 to 7.5, now stable at 7.7. Pt's intraop oliguria improved over SICU stay with fluid resuscitation. Pt was started on DVT PPx on POD1, now on therapeutic Eliquis for bilateral lower extremity DVTs found on admission. Pt became progressively hypertensive to SBP 180s, was restarted on home hydralazine. Pt was advanced to clear liquid diet on POD1, then full liquids. Pt has not passed gas, not complaining of nausea or vomiting. Pt's kumar was removed POD2, failed trial of void x 1, straight cath'd for 405mL. Pt is downgraded to 4C.     PAST MEDICAL & SURGICAL HISTORY:    Allergies    No Known Allergies    Intolerances      MEDICATIONS  (STANDING):  acetaminophen     Tablet .. 975 milliGRAM(s) Oral every 6 hours  apixaban 5 milliGRAM(s) Oral every 12 hours  chlorhexidine 2% Cloths 1 Application(s) Topical <User Schedule>  dextrose 5% + sodium chloride 0.45%. 1000 milliLiter(s) (75 mL/Hr) IV Continuous <Continuous>  hydrALAZINE 25 milliGRAM(s) Oral two times a day  insulin lispro (ADMELOG) corrective regimen sliding scale   SubCutaneous Before meals and at bedtime  metoprolol tartrate 25 milliGRAM(s) Oral two times a day    MEDICATIONS  (PRN):  oxyCODONE    IR 2.5 milliGRAM(s) Oral every 4 hours PRN Moderate Pain (4 - 6)  oxyCODONE    IR 5 milliGRAM(s) Oral every 4 hours PRN Severe Pain (7 - 10)    Vital Signs Last 24 Hrs  T(C): 36.6 (14 Sep 2024 12:00), Max: 36.6 (14 Sep 2024 08:00)  T(F): 97.8 (14 Sep 2024 12:00), Max: 97.8 (14 Sep 2024 08:00)  HR: 80 (14 Sep 2024 14:09) (67 - 94)  BP: 167/83 (14 Sep 2024 14:09) (102/54 - 188/95)  BP(mean): 118 (14 Sep 2024 14:09) (74 - 130)  RR: 25 (14 Sep 2024 14:09) (13 - 29)  SpO2: 96% (14 Sep 2024 14:09) (93% - 98%)    Parameters below as of 14 Sep 2024 08:00  Patient On (Oxygen Delivery Method): room air      I&O's Summary    13 Sep 2024 07:01  -  14 Sep 2024 07:00  --------------------------------------------------------  IN: 2764.1 mL / OUT: 762 mL / NET: 2002.1 mL    14 Sep 2024 07:01  -  14 Sep 2024 15:25  --------------------------------------------------------  IN: 575 mL / OUT: 340 mL / NET: 235 mL        LABS  LABS:                        7.7    9.27  )-----------( 161      ( 14 Sep 2024 11:40 )             22.9       09-14    136  |  105  |  17  ----------------------------<  91  4.2   |  22  |  1.3    Ca    7.7<L>      14 Sep 2024 05:32  Phos  3.8     09-14  Mg     2.2     09-14      Assessment and plan:  79y Female w/ PMHx of metastatic colon/ovarian cancer now s/p exploratory laparotomy, excision of 25cm pelvic mass, JUSTA/BSO, partial resection and reanastomosis of small bowel and transverse colon on 9/12, complicated by acute hypotension requiring vasopressor support. SICU consulted and pt admitted to SICU for hemodynamic monitoring and mechanical ventilation.    NEURO/PSYCH:  #Acute pain  - acetaminophen IV 1000mg q6h PRN for mild pain (1 - 3)  - oxyCODONE    IR 2.5 milliGRAM(s) Oral every 4 hours PRN Moderate Pain (4 - 6)  - oxyCODONE    IR 5 milliGRAM(s) Oral every 4 hours PRN Severe Pain (7 - 10)    RESP:   #mechanical intubation - resolved  - extubated to face mask, now on room air  - wean as tolerated    #Activity  - increase as tolerated    CARDIAC:   #acute hypotension intraoperatively requiring vasopressor support -  resolved  - hypotensive in OR, placed on levophed gtt, max dose of 0.04 mg/kg/hr. Currently off    #PMHx of HTN, ascending aortic aneurysm found on imaging  - metoprolol tartrate 25 milliGRAM(s) Oral two times a day --> (home metoprolol succinate 50mg PO)  - restarted hydralazine 25mg BID, SBP still in 180s --> stat hydralazine 5mg IV x 1. increased PO dose to hydralazine 50mg q12h  - atorvastatin 20mg QD    GI/NUTR:   #partial resection and reanastomosis of transverse colon and small bowel  - Gastroenterology: outpatient colonoscopy for colonic mass seen on CT  - Advanced to clears, then full liquid per Gyn, tolerating. Abd mildly more distended but remains soft, now NPO except meds. Consider KUB if persists.  - aspiration precautions, HOB 30    #GI Prophylaxis  - n/a    #Bowel regimen  - holding    /RENAL:   #metabolic acidosis - resolved  - 1 amp bicarb given in OR without change    #urine output in critically ill  - indwelling kumar (placed 9/12 - 9/13). Failed TOV x 1, straight cath'd for 405mL. Kumar replaced  #IVF  - D5 1/2 NS at 75mL/hr    GYN:   #Metastatic malignancy, 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, now s/p exploratory laparotomy, excision of 25cm pelvic mass, JUSTA/BSO, partial resection and reanastomosis of small bowel and transverse colon on 9/12  - remained intubated postoperatively  - acutely hypotensive intraop, required max levophed gtt 0.04, currently off  - 2u PRBC intraop  - 24h postop cefotetan q8h - complete  - hold DVT PPx for 24h per Gyn --> started 9/13, now on full anticoagulation for bilateral lower extremity DVTs  - f/up further Gyn/onc workup    HEME/ONC:   #acute anemia  - 2u PRBC intraoperatively on 9/12, postop Hgb 11.6 --> 11.4. Hgb dropped to 7.5, now stable at 7.7    #bilateral LE DVTs present on admission, s/p IVC filter (9/11)  - Right lower extremity DVT in peroneal vein and gastrocnemius veins, left lower extremity DVT in gastrocnemius, posterior tibial, and peroneal veins. Pt was on heparin gtt preoperatively.  - Vascular: Consulted for r/o DVTs. Bilateral lower extremity duplex showed Right lower extremity DVT in peroneal vein and gastrocnemius veins and Left lower extremity DVT in gastrocnemius, posterior tibial, and peroneal veins; recommend AC for 3 months and outpatient follow-up.   - IVC filter placed by IR on 9/11  - Mechanical DVT contraindicated due to active DVTs  - apixaban 5 milliGRAM(s) Oral every 12 hours, Stop order after: 90 Days    DVT PPx: c/i due to full anticoagulation     T&S: Expires: 9/17    ID:  #leukopenia likely in the setting of malignancy - resolved  #postoperative prophylaxis  Completed antibiotics - cefoTEtan  IVPB 2g q8h for 24h postop - completed    ENDO:  #PMHx of prediabetes  - A1c, 9/6 - 5.4%  - FSG AC/HS  - Glucose goal 140-180  - Insulin sliding scale    MSK:  #no active issues  - Activity - Increase As Tolerated    DERM:  - DTI screen negative    LINES/DRAINS:  PIV, Kumar Indwelling Urethral Catheter, Arterial Line (9/12 - 9/13), ETT (9/12 - 9/13), OG (9/12 - 9/13)    ADVANCED DIRECTIVES:      HCP/Emergency Contact - silverio Thompson. [phone]    INDICATION FOR SICU: Intra-abdominal and pelvic swelling of mass or lump    DISPO: 4C    Case discussed with attending Dr. Leonardo      Follow Up:  - 20:00 labs  - DONNIE Kumar  - monitor Hgb    Signed out to:  Date:  Time: SICU Transfer Note    SKYLAR ORTIZ  79y (1945)  [id number]      Transfer from: SICU  Transfer to: Surgery-Gyn Onc      SICU COURSE:  79-year-old female with unclear PMHx presented to ED on 9/4/24 for right groin pain with radiation down anterior leg and constipation x 2 days. Patient arrived from [facility] 1 week prior, family noticed decreased appetite and her complaints of right groin pain worsened when walking and decreased bowel movements, despite utilizing prune juice and increasing fluids. Tylenol occasionally improves pain. Patient was seen by her primary Dr. Jefferson last night and directed to the emergency department. Patient's daughter Queta notes patient underwent a recent surgery to her right colon and [facility], unsure what was done or what was removed but is concerned that her constipation and new pain could be related to the surgery. As per primary Dr. Jefferson, pt was diagnosed with metastatic cancer in [facility], possibly colon as primary. Was informed needed to start chemo, however there was no chemo available there at the time.    In the ED, pt was hypertensive to SBP to 170s, afebrile. CT A/P showed 25cm pelvic mass, multiple cystic lesions, and colonic mass concerning for malignancy, bilateral common femoral vein filling defects, bilateral mild hydronephrosis, and incidental findings of ascending aortic aneurysm, calcified splenic artery aneurysm, and small right pleural effusion. Bilateral lower extremity duplex showed Right lower extremity DVT in peroneal vein and gastrocnemius veins and Left lower extremity DVT in gastrocnemius, posterior tibial, and peroneal veins. Pt was admitted to medicine for further workup, see specialty consults below.     Consults:   Interventional radiology: Consulted for biopsy of pelvic mass, recommended against percutaneous biopsy due to possible spilling of neoplastic fluid into abdomen. Additionally consulted for and recommended preoperative IVC filter for bilateral DVTs.  Gynecological oncology: consulted for pelvic mass, recommended TVUS, tumor markers. Recommended resection of pelvic mass. Surgical oncology consulted for assistance of pelvic mass resection.   Gastroenterology: outpatient colonoscopy for colonic mass seen on CT  Vascular: Consulted for r/o DVTs. Bilateral lower extremity duplex showed Right lower extremity DVT in peroneal vein and gastrocnemius veins and Left lower extremity DVT in gastrocnemius, posterior tibial, and peroneal veins; recommend AC for 3 months and outpatient follow-up.     Pt underwent an Exploratory laparotomy, JUSTA & BSO, Omentectomy, Small bowel resection and reanastomosis, transverse colon resection and reanastomosis Appendectomy, and Debulking of pelvic tumor on 9/12 with gynecological oncology and surgical oncology. Case complicated by intraoperative hypotension requiring vasopressor support and metabolic acidosis. SICU consulted for hemodynamic monitoring and management of mechanical ventilation, approved for SICU by Dr. Leonardo.     In the SICU, pt remained off vasopressors for duration of stay. Pt had 1 episode of hypotension on POD0, resolved with crystalloids and albumin. Pt remained intubated postop due to metabolic acidosis, resolved by POD1 and was extubated to face mask, now on room air. Pt's lactate elevated postop to 3.4, resolved with fluids. Pt's Hgb dropped from postop labs 11.4 to 7.5, now stable at 7.7. Pt's intraop oliguria improved over SICU stay with fluid resuscitation. Pt was started on DVT PPx on POD1, now on therapeutic Eliquis for bilateral lower extremity DVTs found on admission. Pt became progressively hypertensive to SBP 180s, was restarted on home hydralazine. Pt was advanced to clear liquid diet on POD1, then full liquids. Pt has not passed gas, not complaining of nausea or vomiting. Pt's kumar was removed POD2, failed trial of void x 1, straight cath'd for 405mL. Pt is downgraded to 4C.     PAST MEDICAL & SURGICAL HISTORY:    Allergies    No Known Allergies    Intolerances      MEDICATIONS  (STANDING):  acetaminophen     Tablet .. 975 milliGRAM(s) Oral every 6 hours  apixaban 5 milliGRAM(s) Oral every 12 hours  chlorhexidine 2% Cloths 1 Application(s) Topical <User Schedule>  dextrose 5% + sodium chloride 0.45%. 1000 milliLiter(s) (75 mL/Hr) IV Continuous <Continuous>  hydrALAZINE 25 milliGRAM(s) Oral two times a day  insulin lispro (ADMELOG) corrective regimen sliding scale   SubCutaneous Before meals and at bedtime  metoprolol tartrate 25 milliGRAM(s) Oral two times a day    MEDICATIONS  (PRN):  oxyCODONE    IR 2.5 milliGRAM(s) Oral every 4 hours PRN Moderate Pain (4 - 6)  oxyCODONE    IR 5 milliGRAM(s) Oral every 4 hours PRN Severe Pain (7 - 10)    Vital Signs Last 24 Hrs  T(C): 36.6 (14 Sep 2024 12:00), Max: 36.6 (14 Sep 2024 08:00)  T(F): 97.8 (14 Sep 2024 12:00), Max: 97.8 (14 Sep 2024 08:00)  HR: 80 (14 Sep 2024 14:09) (67 - 94)  BP: 167/83 (14 Sep 2024 14:09) (102/54 - 188/95)  BP(mean): 118 (14 Sep 2024 14:09) (74 - 130)  RR: 25 (14 Sep 2024 14:09) (13 - 29)  SpO2: 96% (14 Sep 2024 14:09) (93% - 98%)    Parameters below as of 14 Sep 2024 08:00  Patient On (Oxygen Delivery Method): room air      I&O's Summary    13 Sep 2024 07:01  -  14 Sep 2024 07:00  --------------------------------------------------------  IN: 2764.1 mL / OUT: 762 mL / NET: 2002.1 mL    14 Sep 2024 07:01  -  14 Sep 2024 15:25  --------------------------------------------------------  IN: 575 mL / OUT: 340 mL / NET: 235 mL        LABS  LABS:                        7.7    9.27  )-----------( 161      ( 14 Sep 2024 11:40 )             22.9       09-14    136  |  105  |  17  ----------------------------<  91  4.2   |  22  |  1.3    Ca    7.7<L>      14 Sep 2024 05:32  Phos  3.8     09-14  Mg     2.2     09-14      Assessment and plan:  79y Female w/ PMHx of metastatic colon/ovarian cancer now s/p exploratory laparotomy, excision of 25cm pelvic mass, JUSTA/BSO, partial resection and reanastomosis of small bowel and transverse colon on 9/12, complicated by acute hypotension requiring vasopressor support. SICU consulted and pt admitted to SICU for hemodynamic monitoring and mechanical ventilation.    NEURO/PSYCH:  #Acute pain  - acetaminophen IV 1000mg q6h PRN for mild pain (1 - 3)  - oxyCODONE    IR 2.5 milliGRAM(s) Oral every 4 hours PRN Moderate Pain (4 - 6)  - oxyCODONE    IR 5 milliGRAM(s) Oral every 4 hours PRN Severe Pain (7 - 10)    RESP:   #mechanical intubation - resolved  - extubated to face mask, now on room air  - wean as tolerated    #Activity  - increase as tolerated    CARDIAC:   #acute hypotension intraoperatively requiring vasopressor support -  resolved  - hypotensive in OR, placed on levophed gtt, max dose of 0.04 mg/kg/hr. Currently off    #PMHx of HTN, ascending aortic aneurysm found on imaging  - metoprolol tartrate 25 milliGRAM(s) Oral two times a day --> (home metoprolol succinate 50mg PO)  - restarted hydralazine 25mg BID, SBP still in 180s --> stat hydralazine 5mg IV x 1. increased PO dose to hydralazine 50mg q12h  - atorvastatin 20mg QD    GI/NUTR:   #partial resection and reanastomosis of transverse colon and small bowel  - Gastroenterology: outpatient colonoscopy for colonic mass seen on CT  - Advanced to clears, then full liquid per Gyn, tolerating. Abd mildly more distended but remains soft, now NPO except meds. Consider KUB if persists.  - aspiration precautions, HOB 30    #GI Prophylaxis  - n/a    #Bowel regimen  - holding    /RENAL:   #metabolic acidosis - resolved  - 1 amp bicarb given in OR without change    #urine output in critically ill  - indwelling kumar (placed 9/12 - 9/13). Failed TOV x 1, straight cath'd for 405mL. Kumar replaced  #IVF  - D5 1/2 NS at 75mL/hr    GYN:   #Metastatic malignancy, 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, now s/p exploratory laparotomy, excision of 25cm pelvic mass, JUSTA/BSO, partial resection and reanastomosis of small bowel and transverse colon on 9/12  - remained intubated postoperatively  - acutely hypotensive intraop, required max levophed gtt 0.04, currently off  - 2u PRBC intraop  - 24h postop cefotetan q8h - complete  - hold DVT PPx for 24h per Gyn --> started 9/13, now on full anticoagulation for bilateral lower extremity DVTs  - f/up further Gyn/onc workup    HEME/ONC:   #acute anemia  - 2u PRBC intraoperatively on 9/12, postop Hgb 11.6 --> 11.4. Hgb dropped to 7.5, now stable at 7.7    #bilateral LE DVTs present on admission, s/p IVC filter (9/11)  - Right lower extremity DVT in peroneal vein and gastrocnemius veins, left lower extremity DVT in gastrocnemius, posterior tibial, and peroneal veins. Pt was on heparin gtt preoperatively.  - Vascular: Consulted for r/o DVTs. Bilateral lower extremity duplex showed Right lower extremity DVT in peroneal vein and gastrocnemius veins and Left lower extremity DVT in gastrocnemius, posterior tibial, and peroneal veins; recommend AC for 3 months and outpatient follow-up.   - IVC filter placed by IR on 9/11  - Mechanical DVT contraindicated due to active DVTs  - apixaban 5 milliGRAM(s) Oral every 12 hours, Stop order after: 90 Days    DVT PPx: c/i due to full anticoagulation     T&S: Expires: 9/17    ID:  #leukopenia likely in the setting of malignancy - resolved  #postoperative prophylaxis  Completed antibiotics - cefoTEtan  IVPB 2g q8h for 24h postop - completed    ENDO:  #PMHx of prediabetes  - A1c, 9/6 - 5.4%  - FSG AC/HS  - Glucose goal 140-180  - Insulin sliding scale    MSK:  #no active issues  - Activity - Increase As Tolerated    DERM:  - DTI screen negative    LINES/DRAINS:  PIV, Kumar Indwelling Urethral Catheter, Arterial Line (9/12 - 9/13), ETT (9/12 - 9/13), OG (9/12 - 9/13)    ADVANCED DIRECTIVES:      HCP/Emergency Contact - silverio Thompson. [phone]    INDICATION FOR SICU: Intra-abdominal and pelvic swelling of mass or lump    DISPO: 4C    Case discussed with attending Dr. Leonardo      Follow Up:  - 20:00 labs  - DONNIE Kumar  - monitor Hgb  - blood pressure management  - Gyn/onc followup  - vascular follow-up    Signed out to: Violetta Bass MD  Date: 9/14/24  Time: 2024 SICU Transfer Note    SKYLAR ORTIZ  79y (1945)  [id number]      Transfer from: SICU  Transfer to: Surgery-Gyn Onc      SICU COURSE:  79-year-old female with unclear PMHx presented to ED on 9/4/24 for right groin pain with radiation down anterior leg and constipation x 2 days. Patient arrived from [facility] 1 week prior, family noticed decreased appetite and her complaints of right groin pain worsened when walking and decreased bowel movements, despite utilizing prune juice and increasing fluids. Tylenol occasionally improves pain. Patient was seen by her primary Dr. Jefferson last night and directed to the emergency department. Patient's daughter Queta notes patient underwent a recent surgery to her right colon and [facility], unsure what was done or what was removed but is concerned that her constipation and new pain could be related to the surgery. As per primary Dr. Jefferson, pt was diagnosed with metastatic cancer in [facility], possibly colon as primary. Was informed needed to start chemo, however there was no chemo available there at the time.    In the ED, pt was hypertensive to SBP to 170s, afebrile. CT A/P showed 25cm pelvic mass, multiple cystic lesions, and colonic mass concerning for malignancy, bilateral common femoral vein filling defects, bilateral mild hydronephrosis, and incidental findings of ascending aortic aneurysm, calcified splenic artery aneurysm, and small right pleural effusion. Bilateral lower extremity duplex showed Right lower extremity DVT in peroneal vein and gastrocnemius veins and Left lower extremity DVT in gastrocnemius, posterior tibial, and peroneal veins. Pt was admitted to medicine for further workup, see specialty consults below.     Consults:   Interventional radiology: Consulted for biopsy of pelvic mass, recommended against percutaneous biopsy due to possible spilling of neoplastic fluid into abdomen. Additionally consulted for and recommended preoperative IVC filter for bilateral DVTs.  Gynecological oncology: consulted for pelvic mass, recommended TVUS, tumor markers. Recommended resection of pelvic mass. Surgical oncology consulted for assistance of pelvic mass resection.   Gastroenterology: outpatient colonoscopy for colonic mass seen on CT  Vascular: Consulted for r/o DVTs. Bilateral lower extremity duplex showed Right lower extremity DVT in peroneal vein and gastrocnemius veins and Left lower extremity DVT in gastrocnemius, posterior tibial, and peroneal veins; recommend AC for 3 months and outpatient follow-up.     Pt underwent an Exploratory laparotomy, JUSTA & BSO, Omentectomy, Small bowel resection and reanastomosis, transverse colon resection and reanastomosis Appendectomy, and Debulking of pelvic tumor on 9/12 with gynecological oncology and surgical oncology. Case complicated by intraoperative hypotension requiring vasopressor support and metabolic acidosis. SICU consulted for hemodynamic monitoring and management of mechanical ventilation, approved for SICU by Dr. Leonardo.     In the SICU, pt remained off vasopressors for duration of stay. Pt had 1 episode of hypotension on POD0, resolved with crystalloids and albumin. Pt remained intubated postop due to metabolic acidosis, resolved by POD1 and was extubated to face mask, now on room air. Pt's lactate elevated postop to 3.4, resolved with fluids. Pt's Hgb dropped from postop labs 11.4 to 7.5, now stable at 7.7. Pt's intraop oliguria improved over SICU stay with fluid resuscitation. Pt was started on DVT PPx on POD1, now on therapeutic Eliquis for bilateral lower extremity DVTs found on admission. Pt became progressively hypertensive to SBP 180s, was restarted on home hydralazine. Pt was advanced to clear liquid diet on POD1, then full liquids. Pt has not passed gas, not complaining of nausea or vomiting. Pt's kumar was removed POD2, failed trial of void x 1, straight cath'd for 405mL. Pt is downgraded to 4C.     PAST MEDICAL & SURGICAL HISTORY:    Allergies    No Known Allergies    Intolerances      MEDICATIONS  (STANDING):  acetaminophen     Tablet .. 975 milliGRAM(s) Oral every 6 hours  apixaban 5 milliGRAM(s) Oral every 12 hours  chlorhexidine 2% Cloths 1 Application(s) Topical <User Schedule>  dextrose 5% + sodium chloride 0.45%. 1000 milliLiter(s) (75 mL/Hr) IV Continuous <Continuous>  hydrALAZINE 25 milliGRAM(s) Oral two times a day  insulin lispro (ADMELOG) corrective regimen sliding scale   SubCutaneous Before meals and at bedtime  metoprolol tartrate 25 milliGRAM(s) Oral two times a day    MEDICATIONS  (PRN):  oxyCODONE    IR 2.5 milliGRAM(s) Oral every 4 hours PRN Moderate Pain (4 - 6)  oxyCODONE    IR 5 milliGRAM(s) Oral every 4 hours PRN Severe Pain (7 - 10)    Vital Signs Last 24 Hrs  T(C): 36.6 (14 Sep 2024 12:00), Max: 36.6 (14 Sep 2024 08:00)  T(F): 97.8 (14 Sep 2024 12:00), Max: 97.8 (14 Sep 2024 08:00)  HR: 80 (14 Sep 2024 14:09) (67 - 94)  BP: 167/83 (14 Sep 2024 14:09) (102/54 - 188/95)  BP(mean): 118 (14 Sep 2024 14:09) (74 - 130)  RR: 25 (14 Sep 2024 14:09) (13 - 29)  SpO2: 96% (14 Sep 2024 14:09) (93% - 98%)    Parameters below as of 14 Sep 2024 08:00  Patient On (Oxygen Delivery Method): room air      I&O's Summary    13 Sep 2024 07:01  -  14 Sep 2024 07:00  --------------------------------------------------------  IN: 2764.1 mL / OUT: 762 mL / NET: 2002.1 mL    14 Sep 2024 07:01  -  14 Sep 2024 15:25  --------------------------------------------------------  IN: 575 mL / OUT: 340 mL / NET: 235 mL        LABS  LABS:                        7.7    9.27  )-----------( 161      ( 14 Sep 2024 11:40 )             22.9       09-14    136  |  105  |  17  ----------------------------<  91  4.2   |  22  |  1.3    Ca    7.7<L>      14 Sep 2024 05:32  Phos  3.8     09-14  Mg     2.2     09-14      Assessment and plan:  79y Female w/ PMHx of metastatic colon/ovarian cancer now s/p exploratory laparotomy, excision of 25cm pelvic mass, JUSTA/BSO, partial resection and reanastomosis of small bowel and transverse colon on 9/12, complicated by acute hypotension requiring vasopressor support. SICU consulted and pt admitted to SICU for hemodynamic monitoring and mechanical ventilation.    NEURO/PSYCH:  #Acute pain  - acetaminophen PO 975mg q6h   - oxyCODONE    IR 2.5 milliGRAM(s) Oral every 4 hours PRN Moderate Pain (4 - 6)  - oxyCODONE    IR 5 milliGRAM(s) Oral every 4 hours PRN Severe Pain (7 - 10)    RESP:   #mechanical intubation - resolved  - extubated to face mask, now on room air  - wean as tolerated    #Activity  - increase as tolerated    CARDIAC:   #acute hypotension intraoperatively requiring vasopressor support -  resolved  - hypotensive in OR, placed on levophed gtt, max dose of 0.04 mg/kg/hr. Currently off    #PMHx of HTN, ascending aortic aneurysm found on imaging  - metoprolol tartrate 25 milliGRAM(s) Oral two times a day --> (home metoprolol succinate 50mg PO)  - restarted hydralazine 25mg BID, SBP still in 180s --> stat hydralazine 5mg IV x 1. increased PO dose to hydralazine 50mg q12h  - atorvastatin 20mg QD    GI/NUTR:   #partial resection and reanastomosis of transverse colon and small bowel  - Gastroenterology: outpatient colonoscopy for colonic mass seen on CT  - Advanced to clears, then full liquid per Gyn, tolerating. Abd mildly more distended but remains soft, now NPO except meds. Consider KUB if persists.  - aspiration precautions, HOB 30    #GI Prophylaxis  - n/a    #Bowel regimen  - holding    /RENAL:   #metabolic acidosis - resolved  - 1 amp bicarb given in OR without change    #urine output in critically ill  - indwelling kumar (placed 9/12 - 9/13). Failed TOV x 1, straight cath'd for 405mL. Kumar replaced  #IVF  - D5 1/2 NS at 75mL/hr    GYN:   #Metastatic malignancy, 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, now s/p exploratory laparotomy, excision of 25cm pelvic mass, JUSTA/BSO, partial resection and reanastomosis of small bowel and transverse colon on 9/12  - remained intubated postoperatively  - acutely hypotensive intraop, required max levophed gtt 0.04, currently off  - 2u PRBC intraop  - 24h postop cefotetan q8h - complete  - hold DVT PPx for 24h per Gyn --> started 9/13, now on full anticoagulation for bilateral lower extremity DVTs  - f/up further Gyn/onc workup    HEME/ONC:   #acute anemia  - 2u PRBC intraoperatively on 9/12, postop Hgb 11.6 --> 11.4. Hgb dropped to 7.5, now stable at 7.7    #bilateral LE DVTs present on admission, s/p IVC filter (9/11)  - Right lower extremity DVT in peroneal vein and gastrocnemius veins, left lower extremity DVT in gastrocnemius, posterior tibial, and peroneal veins. Pt was on heparin gtt preoperatively.  - Vascular: Consulted for r/o DVTs. Bilateral lower extremity duplex showed Right lower extremity DVT in peroneal vein and gastrocnemius veins and Left lower extremity DVT in gastrocnemius, posterior tibial, and peroneal veins; recommend AC for 3 months and outpatient follow-up.   - IVC filter placed by IR on 9/11  - Mechanical DVT contraindicated due to active DVTs  - apixaban 5 milliGRAM(s) Oral every 12 hours, Stop order after: 90 Days    DVT PPx: c/i due to full anticoagulation     T&S: Expires: 9/17    ID:  #leukopenia likely in the setting of malignancy - resolved  #postoperative prophylaxis  Completed antibiotics - cefoTEtan  IVPB 2g q8h for 24h postop - completed    ENDO:  #PMHx of prediabetes  - A1c, 9/6 - 5.4%  - FSG AC/HS  - Glucose goal 140-180  - Insulin sliding scale    MSK:  #no active issues  - Activity - Increase As Tolerated    DERM:  - DTI screen negative    LINES/DRAINS:  PIV, Kumar Indwelling Urethral Catheter, Arterial Line (9/12 - 9/13), ETT (9/12 - 9/13), OG (9/12 - 9/13)    ADVANCED DIRECTIVES:      HCP/Emergency Contact - silverio Thompson. [phone]    INDICATION FOR SICU: Intra-abdominal and pelvic swelling of mass or lump    DISPO: 4C    Case discussed with attending Dr. Leonardo      Follow Up:  - 20:00 labs  - Kumar, TOV  - monitor Hgb  - blood pressure management  - Gyn/onc followup  - vascular follow-up    Signed out to: Violetta Bass MD  Date: 9/14/24  Time: 2024

## 2024-09-15 LAB
ANION GAP SERPL CALC-SCNC: 10 MMOL/L — SIGNIFICANT CHANGE UP (ref 7–14)
BASOPHILS # BLD AUTO: 0.01 K/UL — SIGNIFICANT CHANGE UP (ref 0–0.2)
BASOPHILS NFR BLD AUTO: 0.1 % — SIGNIFICANT CHANGE UP (ref 0–1)
BUN SERPL-MCNC: 10 MG/DL — SIGNIFICANT CHANGE UP (ref 10–20)
CALCIUM SERPL-MCNC: 7.5 MG/DL — LOW (ref 8.4–10.4)
CHLORIDE SERPL-SCNC: 100 MMOL/L — SIGNIFICANT CHANGE UP (ref 98–110)
CO2 SERPL-SCNC: 22 MMOL/L — SIGNIFICANT CHANGE UP (ref 17–32)
CREAT SERPL-MCNC: 0.9 MG/DL — SIGNIFICANT CHANGE UP (ref 0.7–1.5)
EGFR: 66 ML/MIN/1.73M2 — SIGNIFICANT CHANGE UP
EOSINOPHIL # BLD AUTO: 0 K/UL — SIGNIFICANT CHANGE UP (ref 0–0.7)
EOSINOPHIL NFR BLD AUTO: 0 % — SIGNIFICANT CHANGE UP (ref 0–8)
GLUCOSE BLDC GLUCOMTR-MCNC: 129 MG/DL — HIGH (ref 70–99)
GLUCOSE BLDC GLUCOMTR-MCNC: 131 MG/DL — HIGH (ref 70–99)
GLUCOSE BLDC GLUCOMTR-MCNC: 132 MG/DL — HIGH (ref 70–99)
GLUCOSE BLDC GLUCOMTR-MCNC: 135 MG/DL — HIGH (ref 70–99)
GLUCOSE BLDC GLUCOMTR-MCNC: 162 MG/DL — HIGH (ref 70–99)
GLUCOSE SERPL-MCNC: 126 MG/DL — HIGH (ref 70–99)
HCT VFR BLD CALC: 28.9 % — LOW (ref 37–47)
HGB BLD-MCNC: 9.7 G/DL — LOW (ref 12–16)
IMM GRANULOCYTES NFR BLD AUTO: 0.9 % — HIGH (ref 0.1–0.3)
LYMPHOCYTES # BLD AUTO: 0.69 K/UL — LOW (ref 1.2–3.4)
LYMPHOCYTES # BLD AUTO: 7.1 % — LOW (ref 20.5–51.1)
MAGNESIUM SERPL-MCNC: 2 MG/DL — SIGNIFICANT CHANGE UP (ref 1.8–2.4)
MCHC RBC-ENTMCNC: 28.4 PG — SIGNIFICANT CHANGE UP (ref 27–31)
MCHC RBC-ENTMCNC: 33.6 G/DL — SIGNIFICANT CHANGE UP (ref 32–37)
MCV RBC AUTO: 84.8 FL — SIGNIFICANT CHANGE UP (ref 81–99)
MONOCYTES # BLD AUTO: 0.4 K/UL — SIGNIFICANT CHANGE UP (ref 0.1–0.6)
MONOCYTES NFR BLD AUTO: 4.1 % — SIGNIFICANT CHANGE UP (ref 1.7–9.3)
NEUTROPHILS # BLD AUTO: 8.52 K/UL — HIGH (ref 1.4–6.5)
NEUTROPHILS NFR BLD AUTO: 87.8 % — HIGH (ref 42.2–75.2)
NRBC # BLD: 0 /100 WBCS — SIGNIFICANT CHANGE UP (ref 0–0)
PHOSPHATE SERPL-MCNC: 3.5 MG/DL — SIGNIFICANT CHANGE UP (ref 2.1–4.9)
PLATELET # BLD AUTO: 217 K/UL — SIGNIFICANT CHANGE UP (ref 130–400)
PMV BLD: 9.6 FL — SIGNIFICANT CHANGE UP (ref 7.4–10.4)
POTASSIUM SERPL-MCNC: 4.1 MMOL/L — SIGNIFICANT CHANGE UP (ref 3.5–5)
POTASSIUM SERPL-SCNC: 4.1 MMOL/L — SIGNIFICANT CHANGE UP (ref 3.5–5)
RBC # BLD: 3.41 M/UL — LOW (ref 4.2–5.4)
RBC # FLD: 16.5 % — HIGH (ref 11.5–14.5)
SODIUM SERPL-SCNC: 132 MMOL/L — LOW (ref 135–146)
WBC # BLD: 9.71 K/UL — SIGNIFICANT CHANGE UP (ref 4.8–10.8)
WBC # FLD AUTO: 9.71 K/UL — SIGNIFICANT CHANGE UP (ref 4.8–10.8)

## 2024-09-15 PROCEDURE — 74018 RADEX ABDOMEN 1 VIEW: CPT | Mod: 26

## 2024-09-15 RX ORDER — PANTOPRAZOLE SODIUM 40 MG
40 TABLET, DELAYED RELEASE (ENTERIC COATED) ORAL ONCE
Refills: 0 | Status: COMPLETED | OUTPATIENT
Start: 2024-09-15 | End: 2024-09-15

## 2024-09-15 RX ORDER — ACETAMINOPHEN 325 MG/1
1000 TABLET ORAL ONCE
Refills: 0 | Status: DISCONTINUED | OUTPATIENT
Start: 2024-09-15 | End: 2024-09-15

## 2024-09-15 RX ORDER — FUROSEMIDE 40 MG
10 TABLET ORAL ONCE
Refills: 0 | Status: DISCONTINUED | OUTPATIENT
Start: 2024-09-15 | End: 2024-09-15

## 2024-09-15 RX ORDER — FUROSEMIDE 40 MG
10 TABLET ORAL ONCE
Refills: 0 | Status: COMPLETED | OUTPATIENT
Start: 2024-09-15 | End: 2024-09-15

## 2024-09-15 RX ORDER — ONDANSETRON 2 MG/ML
4 INJECTION, SOLUTION INTRAMUSCULAR; INTRAVENOUS ONCE
Refills: 0 | Status: COMPLETED | OUTPATIENT
Start: 2024-09-15 | End: 2024-09-15

## 2024-09-15 RX ORDER — ONDANSETRON 2 MG/ML
4 INJECTION, SOLUTION INTRAMUSCULAR; INTRAVENOUS ONCE
Refills: 0 | Status: DISCONTINUED | OUTPATIENT
Start: 2024-09-15 | End: 2024-09-15

## 2024-09-15 RX ORDER — HYDRALAZINE HCL 50 MG
5 TABLET ORAL ONCE
Refills: 0 | Status: COMPLETED | OUTPATIENT
Start: 2024-09-15 | End: 2024-09-15

## 2024-09-15 RX ADMIN — Medication 0: at 22:32

## 2024-09-15 RX ADMIN — METOPROLOL TARTRATE 25 MILLIGRAM(S): 100 TABLET ORAL at 18:09

## 2024-09-15 RX ADMIN — Medication 50 MILLIGRAM(S): at 05:28

## 2024-09-15 RX ADMIN — APIXABAN 5 MILLIGRAM(S): 5 TABLET, FILM COATED ORAL at 18:04

## 2024-09-15 RX ADMIN — ACETAMINOPHEN 975 MILLIGRAM(S): 325 TABLET ORAL at 03:49

## 2024-09-15 RX ADMIN — Medication 5 MILLIGRAM(S): at 10:21

## 2024-09-15 RX ADMIN — ACETAMINOPHEN 975 MILLIGRAM(S): 325 TABLET ORAL at 10:19

## 2024-09-15 RX ADMIN — ACETAMINOPHEN 975 MILLIGRAM(S): 325 TABLET ORAL at 22:22

## 2024-09-15 RX ADMIN — Medication 50 MILLIGRAM(S): at 18:09

## 2024-09-15 RX ADMIN — ONDANSETRON 4 MILLIGRAM(S): 2 INJECTION, SOLUTION INTRAMUSCULAR; INTRAVENOUS at 15:01

## 2024-09-15 RX ADMIN — OXYCODONE HYDROCHLORIDE 5 MILLIGRAM(S): 5 TABLET ORAL at 11:15

## 2024-09-15 RX ADMIN — Medication 10 MILLIGRAM(S): at 15:01

## 2024-09-15 RX ADMIN — ACETAMINOPHEN 975 MILLIGRAM(S): 325 TABLET ORAL at 15:01

## 2024-09-15 RX ADMIN — CHLORHEXIDINE GLUCONATE 1 APPLICATION(S): 40 SOLUTION TOPICAL at 05:28

## 2024-09-15 RX ADMIN — APIXABAN 5 MILLIGRAM(S): 5 TABLET, FILM COATED ORAL at 05:28

## 2024-09-15 RX ADMIN — Medication 50 GRAM(S): at 00:22

## 2024-09-15 RX ADMIN — ACETAMINOPHEN 975 MILLIGRAM(S): 325 TABLET ORAL at 04:39

## 2024-09-15 RX ADMIN — Medication 20 MILLIGRAM(S): at 22:23

## 2024-09-15 RX ADMIN — Medication 40 MILLIGRAM(S): at 10:18

## 2024-09-15 RX ADMIN — ACETAMINOPHEN 975 MILLIGRAM(S): 325 TABLET ORAL at 23:00

## 2024-09-15 RX ADMIN — METOPROLOL TARTRATE 25 MILLIGRAM(S): 100 TABLET ORAL at 05:28

## 2024-09-15 RX ADMIN — Medication 75 MILLILITER(S): at 18:03

## 2024-09-15 RX ADMIN — Medication 2: at 18:04

## 2024-09-15 RX ADMIN — SODIUM PHOSPHATE, MONOBASIC, MONOHYDRATE AND SODIUM PHOSPHATE, DIBASIC ANHYDROUS 85 MILLIMOLE(S): 276; 142 INJECTION, SOLUTION INTRAVENOUS at 01:43

## 2024-09-15 NOTE — CHART NOTE - NSCHARTNOTESELECT_GEN_ALL_CORE
Event Note
IR/Event Note
post op check/Event Note
Calorie Count/Nutrition Services
Calorie Count/Nutrition Services
GynOnc/Event Note
Surgical-Oncology
Transfer Note
gynonc/Event Note

## 2024-09-15 NOTE — PROGRESS NOTE ADULT - SUBJECTIVE AND OBJECTIVE BOX
PGY4 Note  Delmi : [id number] Ames  POD #: 2    s/p ExLap, JUSTA, BSO, omentectomy, appendectomy, small bowel resection+reanastomosis, transverse colon resection+reanastomosis, tumor debulking likely from right ovarian, s/p 2u PRBC intraop, on pressors, EBL 500cc    HPI: Pt seen and examined at bedside. Resting comfortably. She reports her pain is better today and feels her abdomen is less distended. Minimally ambulating. Not yet passing flatus or passing BM, but is burping. Denies HA, CP, SOB, nausea, vomiting, fevers, chills, vaginal bleeding.     Physical Exam  Vital Signs Last 24 Hrs  T(F): 98.3 (15 Sep 2024 04:47), Max: 98.3 (15 Sep 2024 04:47)  HR: 78 (15 Sep 2024 04:47) (67 - 83)  BP: 174/79 (15 Sep 2024 04:47) (137/63 - 210/91)  RR: 18 (15 Sep 2024 04:47) (14 - 28)    Physical exam:  General - AAOx3, NAD  Abdomen   - Soft, nontender, mildly distended, +BS x4 quadrants  - Clean, dry, intact vertical ex-lap incision with surgical staples   Pelvis/Vagina - No bleeding, deferred  Extremities - No calf tenderness, no swelling   - Fischer catheter in place    Labs:             8.3    9.49  )-----------( 180      ( 09-14 @ 21:19 )             24.6                7.7    9.27  )-----------( 161      ( 09-14 @ 11:40 )             22.9                7.5    8.82  )-----------( 158      ( 09-14 @ 05:32 )             22.5                11.4   10.91 )-----------( 222      ( 09-12 @ 21:35 )             34.0                11.6   3.99  )-----------( 207      ( 09-12 @ 14:12 )             35.1     14 Sep 2024 21:19    136    |  105    |  14     ----------------------------<  102<H>  4.1     |  22     |  1.1    14 Sep 2024 05:32    136    |  105    |  17     ----------------------------<  91     4.2     |  22     |  1.3      Ca    7.6<L>      14 Sep 2024 21:19  Ca    7.7<L>      14 Sep 2024 05:32  Phos  2.5       14 Sep 2024 21:19  Phos  3.8       14 Sep 2024 05:32  Mg     1.9       14 Sep 2024 21:19  Mg     2.2       14 Sep 2024 05:32    TPro  3.8<L>  /  Alb  2.4<L>  /  TBili  0.7    /  DBili  x      /  AST  31     /  ALT  15     /  AlkPhos  47     12 Sep 2024 14:12    MEDICATIONS  (STANDING):  acetaminophen     Tablet .. 975 milliGRAM(s) Oral every 6 hours  apixaban 5 milliGRAM(s) Oral every 12 hours  atorvastatin 20 milliGRAM(s) Oral at bedtime  chlorhexidine 2% Cloths 1 Application(s) Topical <User Schedule>  dextrose 5% + sodium chloride 0.45%. 1000 milliLiter(s) (75 mL/Hr) IV Continuous <Continuous>  hydrALAZINE 50 milliGRAM(s) Oral every 12 hours  insulin lispro (ADMELOG) corrective regimen sliding scale   SubCutaneous Before meals and at bedtime  metoprolol tartrate 25 milliGRAM(s) Oral two times a day    MEDICATIONS  (PRN):  oxyCODONE    IR 5 milliGRAM(s) Oral every 4 hours PRN Severe Pain (7 - 10)  oxyCODONE    IR 2.5 milliGRAM(s) Oral every 4 hours PRN Moderate Pain (4 - 6)    ---  9/5: 3.9>9.1/27.2<209, ptt 51.5, 130/4.2/96/26/15/1/103, mag 1.9, A pos/neg  estradiol 15. A1C 5.4%  9/6 CA-125 34, CA 19-9 30, CEA 4.8, inhibin A 1.5 (n), inhibin B 19.4  9/8 A pos  9/10  TTE- LVEF 57%, dilatation of the ascending aorta, suboptimally visualized (4.0cm, 2.4cm/m2).  9/11 3.44>8.6/26<233, pt/INR/ptt 11.6/1.02/56.9, 134/4.4/99/24/14/1.1/92, mag 1.9, A pos/neg  @1700     9/12 (PACU) 3.99>11.6/35.1<207, 134/3.9/105/17/12/0.9<135, AST/ALT 31/15, Mg 1.5, Ph 4.3, ABG: pH 7.3, BE -9.2  9/12 @2135 10.91>11.4/34<222, 132/4.5/102/14/15/1.1<210, Mg 2.6, Phos 4.9  9/14 @0430 8.81>7.5/22.5<158, 136/4.2/105/22/17/1.3<91, Mg 2.2, Phos 3.8, A pos  @1100 9.27>7.7/22.9<161  @2000 9.49>8.3/24.6<180, 136/4.1/105/22/14/1.1<102, Mg 1.9, Phos 2.5  --  9/4 CT abdomen: Large multiloculated cystic mass with areas of soft tissue, measuring 24.7 x 12.5 x 22.3 cm, extending from the pelvis into the upper abdomen. Findings are concerning for malignancy; felt to likely be gynecologic in etiology, but this mass remains indeterminate. Questionable filling defects in both the right and left common femoral vein and deep venous thrombosis is not excluded. Further evaluation with ultrasound is recommended. Mild bilateral hydronephrosis. Indeterminate multiple fluid-filled/cystic lesions within the abdomen measuring up to 6.5 cm, felt to likely be related to cystic metastatic disease. Soft tissue density within the transverse colon adjacent to the splenic flexure on image 52 series 2. Findings may be related to focal stool. Underlying neoplasm cannot be excluded. Subcutaneous nodule measuring 1.8 cm and subcentimeter sclerotic focus in T12, metastatic disease is not excluded. Small right pleural effusion. Ascending aorta measures 4.5 cm, aneurysmal. 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  9/6 TVUS: Large heterogeneous mass with measuring 22.6 x 16.9 x 20.3 cm within the pelvic cavity. The mass obscures visualization of the uterus and ovaries.  9/12 PACU CXR: No acute pulmonary process.  9/12 PACU EKG: Diagnosis Line Sinus bradycardia. Anterior infarct , age undetermined. T wave abnormality, consider inferior ischemia. Prolonged QT

## 2024-09-15 NOTE — PROGRESS NOTE ADULT - ASSESSMENT
SKYLAR ORTIZ is a79F s/p ExLap, JUSTA, BSO, omentectomy, appendectomy, small bowel resection+reanastomosis, transverse colon resection+reanastomosis, tumor debulking with General surgery intra-op consult for segmental transverse colectomy with anastomosis on 9/12.    PLAN:  - Monitor vitals  - Monitor labs and replete as necessary  - Monitor for bowel function  - Multimodal pain regimen  - Encourage ambulation as tolerated  - DVT and GI Prophylaxis  - Rest of care per OBGYN    Lines/Tubes: PIV,     BLUE TEAM SPECTRA [id number]

## 2024-09-15 NOTE — PATIENT PROFILE ADULT - NSPROSPHOSPCHAPLAINYN_GEN_A_NUR
Alida Benitez  [id number]  [birth year]    The midwives at [facility], [facility] [address], share daily and weekend call coverage.  The following is a list of these providers:    Diane Johnson, MARIO Miller, MARIO Petty, MARIO Rivers, MARIO Wheeler, AGAPITO     In general, one of the above mentioned midwives covers the whole group for daily and weekend call rotation. In rare circumstances, call coverage may be provided by an [facility] physician.    For this reason, please understand that we cannot guarantee which OB/GYN provider will be present at the time of your delivery. You could be delivered by any one of the midwives or physicians (male or female) on call.    While in the hospital, your care from other physicians (anesthesia, neonatology, etc.) will be provided by the midwife or physician (male or female) that is on call.    I have read and fully understand the above information and agree to the same.    ________________________________________  Alida Benitez    ________________________________________      no

## 2024-09-15 NOTE — PATIENT PROFILE ADULT - PRO INTERPRETER NEED 2
Patient scheduled for Surgery  with Dr. Nadine Villatoro  for  Fusion of interphalangeal joint of hallux, left foot  [address]  on 3/16/20. Reviewed instructions for surgery and sent via [employer]. Patient had no further questions or concerns Closing encounter.
Patient to be scheduled for surgery, to be determined for preferred date.     Surgery data letter entered into patient chart    Routing to Surgery Scheduler  Pool for completion
Delmi

## 2024-09-15 NOTE — PATIENT PROFILE ADULT - FALL HARM RISK - HARM RISK INTERVENTIONS

## 2024-09-15 NOTE — PROGRESS NOTE ADULT - ASSESSMENT
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      # abdomino-pelvic mass, likely arising from right adnexa, s/p Ex-Lap and bowel resections (as above)  - S/p 2u PRBC intraop. S/p 24hrs of cefotetan  - 9/12 s/p pressors intraop and shortly postop. Extubated 9/13.   - H/h downtrending, now stable at 8.3. Ordered repeat 1100 labs  - PT previously following, will call again for follow up  - final pathology pending results  [facility]  - will advance from NPO to CLD today  - encouraged ambulation. not yet passing flatus/BM  - f/u AM KUB  - pain management with tylenol + oxy PRN  - kumar catheter reinserted on 9/14 due to failed TOV. now with adequate urine output, will discontinue and f/u TOV in 6 hours  - colorectal also following, reccs appreciated    # bilateral  LE DVTs  - VA Duplex LE bilateral: Right lower extremity DVT in peroneal vein and gastrocnemius veins. Left lower extremity DVT in gastrocnemius, posterior tibial, and peroneal veins.  - Previously on Heparin Drip. IVC filters placed by IR on 9/11 in preparation for the OR.   - NO SCDS  - c/w Eliquis 5mg BID. Per vascular consult, continue AC for 3 months.    #Prediabetes, A1C now 5.4%  - FS ACqHs. ISS.     #Ascending Aortic Aneurysm ~4cm. HTN.   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      # abdomino-pelvic mass, likely arising from right adnexa, s/p Ex-Lap and bowel resections (as above)  - S/p 2u PRBC intraop. S/p 24hrs of cefotetan  - 9/12 s/p pressors intraop and shortly postop. Extubated 9/13.   - H/h downtrending, now stable at 8.3. Ordered repeat 1100 labs  - PT previously following, will call again for follow up  - final pathology pending results  - hemeonc following (Dr Acosta)  - will advance from NPO to CLD today  - encouraged ambulation. not yet passing flatus/BM  - f/u AM KUB  - pain management with tylenol + oxy PRN  - kumar catheter reinserted on 9/14 due to failed TOV. now with adequate urine output, will discontinue and f/u TOV in 6 hours  - surgon also following, reccs appreciated    # bilateral  LE DVTs  - VA Duplex LE bilateral: Right lower extremity DVT in peroneal vein and gastrocnemius veins. Left lower extremity DVT in gastrocnemius, posterior tibial, and peroneal veins.  - Previously on Heparin Drip. IVC filters placed by IR on 9/11 in preparation for the OR.   - NO SCDS  - c/w Eliquis 5mg BID. Per vascular consult, continue AC for 3 months.    #Prediabetes, A1C now 5.4%  - FS ACqHs. ISS.     #Ascending Aortic Aneurysm ~4cm. HTN.   - S/p normal TTE otherwise on 9/11  - Current meds: hydralazine 50mg BID, metoprolol 20mg BID

## 2024-09-15 NOTE — CHART NOTE - NSCHARTNOTEFT_GEN_A_CORE
PGY4 NOTE    Called by RN that patient experiencing more nausea now. No vomiting. Also reports abdominal pain preventing her from ambulating. Continues to feel gas in her abdomen but not yet passing gas.     Vital Signs Last 24 Hrs  T(C): 36.6 (15 Sep 2024 12:09), Max: 36.8 (14 Sep 2024 20:00)  T(F): 97.8 (15 Sep 2024 12:09), Max: 98.3 (15 Sep 2024 04:47)  HR: 63 (15 Sep 2024 12:09) (60 - 83)  BP: 188/87 (15 Sep 2024 12:09) (137/63 - 210/91)  BP(mean): 101 (14 Sep 2024 23:00) (90 - 130)  RR: 16 (15 Sep 2024 12:09) (16 - 28)  SpO2: 97% (15 Sep 2024 12:09) (95% - 99%)    Parameters below as of 15 Sep 2024 12:09  Patient On (Oxygen Delivery Method): room air    gen: AAOx3, NAD  abd: soft, distended    Plan:   - IV tylenol   - IVP zofran  - IVP 10mg lasix x1 for elevated BPs  - Discussed with family member at bedside that she likely has postop ileus and requires ambulation for recovery. Reported they will walk around the nix today.     D/w Dr Weiss

## 2024-09-15 NOTE — PROGRESS NOTE ADULT - SUBJECTIVE AND OBJECTIVE BOX
GENERAL SURGERY PROGRESS NOTE     Patient: SKYLAR ORTIZ , 79y ([birth year])Female   MRN: [id number]  Location: [address]  Visit: 09-04-24 Inpatient  Date: 09-15-24 @ 08:39        Admitted :09-04-24 (11d)  LOS: 11d    Procedure/Dx/Injuries: Pelvic mass    Colonic mass    Abdominal mass    Suspected pulmonary embolism    Exploratory laparotomy by gynecology    JUSTA & BSO    Omentectomy    Small bowel resection    Resection, transverse colon    Appendectomy    Debulking of pelvic tumor         Events of past 24 hours: Patient seen and examined at bedside. She failed her trial of void, so kumar was reinserted. Patient's abdomen was distended so diet was changed from CLD to NPO on IVF. Patient denies any nausea, vomiting and is not passing gas or having bowel movements. KUB was done this morning.  >>> <<<>>> <<<>>> <<<>>> <<<>>> <<<>>> <<<>>> <<<>>> <<<>>> <<<>>> <<<    Vitals:   T(F): 98.3 (09-15-24 @ 04:47), Max: 98.3 (09-15-24 @ 04:47)  HR: 78 (09-15-24 @ 04:47)  BP: 174/79 (09-15-24 @ 04:47)  RR: 18 (09-15-24 @ 04:47)  SpO2: 99% (09-15-24 @ 04:47)      PHYSICAL EXAM:  General Appearance: NAD, lying in bed    Lungs: No increased work of breathing or accessory muscle use. Symmetric chest wall rise and fall. Saturating well on room air  Abdomen:  Soft, mildly distended, tender. Midline incision with staples clean, dry and intact with no bleeding or drainage  MSK/Extremities: Warm & well-perfused.   Skin: Warm, dry. No jaundice.   >>> <<<>>> <<<>>> <<<>>> <<<>>> <<<>>> <<<>>> <<<>>> <<<>>> <<<>>> <<<   Is & Os:   Diet, Clear Liquid    Fluids: dextrose 5% + sodium chloride 0.45%.: Solution, 1000 milliLiter(s) infuse at 75 mL/Hr      09-14-24 @ 07:01  -  09-15-24 @ 07:00  --------------------------------------------------------  IN:    dextrose 5% + sodium chloride 0.45%: 1575 mL    Lactated Ringers: 200 mL  Total IN: 1775 mL    OUT:    Indwelling Catheter - Urethral (mL): 1160 mL    Oral Fluid: 0 mL    Voided (mL): 0 mL  Total OUT: 1160 mL    Total NET: 615 mL          Bowel Movement: -  Flatus: -  >>> <<<>>> <<<>>> <<<>>> <<<>>> <<<>>> <<<>>> <<<>>> <<<>>> <<<>>> <<<    MEDICATIONS  (STANDING):  acetaminophen     Tablet .. 975 milliGRAM(s) Oral every 6 hours  apixaban 5 milliGRAM(s) Oral every 12 hours  atorvastatin 20 milliGRAM(s) Oral at bedtime  chlorhexidine 2% Cloths 1 Application(s) Topical <User Schedule>  dextrose 5% + sodium chloride 0.45%. 1000 milliLiter(s) (75 mL/Hr) IV Continuous <Continuous>  hydrALAZINE 50 milliGRAM(s) Oral every 12 hours  insulin lispro (ADMELOG) corrective regimen sliding scale   SubCutaneous Before meals and at bedtime  metoprolol tartrate 25 milliGRAM(s) Oral two times a day    MEDICATIONS  (PRN):  oxyCODONE    IR 2.5 milliGRAM(s) Oral every 4 hours PRN Moderate Pain (4 - 6)  oxyCODONE    IR 5 milliGRAM(s) Oral every 4 hours PRN Severe Pain (7 - 10)      DVT PROPHYLAXIS:   GI PROPHYLAXIS:   ANTICOAGULATION:   ANTIBIOTICS:      >>> <<<>>> <<<>>> <<<>>> <<<>>> <<<>>> <<<>>> <<<>>> <<<>>> <<<>>> <<<        LAB/STUDIES:  Labs:  CAPILLARY BLOOD GLUCOSE      POCT Blood Glucose.: 131 mg/dL (15 Sep 2024 07:56)  POCT Blood Glucose.: 129 mg/dL (15 Sep 2024 05:38)  POCT Blood Glucose.: 126 mg/dL (14 Sep 2024 22:33)  POCT Blood Glucose.: 125 mg/dL (14 Sep 2024 17:41)  POCT Blood Glucose.: 124 mg/dL (14 Sep 2024 11:08)                          8.3    9.49  )-----------( 180      ( 14 Sep 2024 21:19 )             24.6       Auto Neutrophil %: 85.8 % (09-14-24 @ 21:19)  Auto Immature Granulocyte %: 1.1 % (09-14-24 @ 21:19)  Auto Neutrophil %: 86.8 % (09-14-24 @ 11:40)  Auto Immature Granulocyte %: 0.6 % (09-14-24 @ 11:40)    09-14    136  |  105  |  14  ----------------------------<  102<H>  4.1   |  22  |  1.1      Phosphorus: 2.5 mg/dL (09-14-24 @ 21:19)      LFTs:     Blood Gas Arterial, Lactate: 1.4 mmol/L (09-13-24 @ 11:38)  Blood Gas Arterial, Lactate: 1.5 mmol/L (09-13-24 @ 09:56)  Blood Gas Arterial, Lactate: 3.4 mmol/L (09-13-24 @ 05:03)  Blood Gas Arterial, Lactate: 2.8 mmol/L (09-12-24 @ 23:14)  Blood Gas Arterial, Lactate: 2.9 mmol/L (09-12-24 @ 18:39)  Blood Gas Arterial, Lactate: 1.8 mmol/L (09-12-24 @ 14:07)    ABG - ( 13 Sep 2024 11:38 )  pH: 7.40  /  pCO2: 32    /  pO2: 147   / HCO3: 20    / Base Excess: -4.4  /  SaO2: 100.0           ABG - ( 13 Sep 2024 09:56 )  pH: 7.37  /  pCO2: 32    /  pO2: 169   / HCO3: 18    / Base Excess: -6.1  /  SaO2: 99.8            ABG - ( 13 Sep 2024 05:03 )  pH: 7.37  /  pCO2: 29    /  pO2: 139   / HCO3: 17    / Base Excess: -7.6  /  SaO2: 99.6              Coags:            Urinalysis Basic - ( 14 Sep 2024 21:19 )    Color: x / Appearance: x / SG: x / pH: x  Gluc: 102 mg/dL / Ketone: x  / Bili: x / Urobili: x   Blood: x / Protein: x / Nitrite: x   Leuk Esterase: x / RBC: x / WBC x   Sq Epi: x / Non Sq Epi: x / Bacteria: x

## 2024-09-16 LAB
ANION GAP SERPL CALC-SCNC: 8 MMOL/L — SIGNIFICANT CHANGE UP (ref 7–14)
BASOPHILS # BLD AUTO: 0.01 K/UL — SIGNIFICANT CHANGE UP (ref 0–0.2)
BASOPHILS NFR BLD AUTO: 0.1 % — SIGNIFICANT CHANGE UP (ref 0–1)
BUN SERPL-MCNC: 12 MG/DL — SIGNIFICANT CHANGE UP (ref 10–20)
CALCIUM SERPL-MCNC: 7.3 MG/DL — LOW (ref 8.4–10.5)
CHLORIDE SERPL-SCNC: 102 MMOL/L — SIGNIFICANT CHANGE UP (ref 98–110)
CO2 SERPL-SCNC: 21 MMOL/L — SIGNIFICANT CHANGE UP (ref 17–32)
CREAT SERPL-MCNC: 0.8 MG/DL — SIGNIFICANT CHANGE UP (ref 0.7–1.5)
EGFR: 76 ML/MIN/1.73M2 — SIGNIFICANT CHANGE UP
EOSINOPHIL # BLD AUTO: 0 K/UL — SIGNIFICANT CHANGE UP (ref 0–0.7)
EOSINOPHIL NFR BLD AUTO: 0 % — SIGNIFICANT CHANGE UP (ref 0–8)
GLUCOSE BLDC GLUCOMTR-MCNC: 118 MG/DL — HIGH (ref 70–99)
GLUCOSE SERPL-MCNC: 103 MG/DL — HIGH (ref 70–99)
HCT VFR BLD CALC: 25 % — LOW (ref 37–47)
HGB BLD-MCNC: 8.3 G/DL — LOW (ref 12–16)
IMM GRANULOCYTES NFR BLD AUTO: 1.2 % — HIGH (ref 0.1–0.3)
LYMPHOCYTES # BLD AUTO: 0.83 K/UL — LOW (ref 1.2–3.4)
LYMPHOCYTES # BLD AUTO: 10.8 % — LOW (ref 20.5–51.1)
MAGNESIUM SERPL-MCNC: 1.9 MG/DL — SIGNIFICANT CHANGE UP (ref 1.8–2.4)
MCHC RBC-ENTMCNC: 29.2 PG — SIGNIFICANT CHANGE UP (ref 27–31)
MCHC RBC-ENTMCNC: 33.2 G/DL — SIGNIFICANT CHANGE UP (ref 32–37)
MCV RBC AUTO: 88 FL — SIGNIFICANT CHANGE UP (ref 81–99)
MONOCYTES # BLD AUTO: 0.4 K/UL — SIGNIFICANT CHANGE UP (ref 0.1–0.6)
MONOCYTES NFR BLD AUTO: 5.2 % — SIGNIFICANT CHANGE UP (ref 1.7–9.3)
NEUTROPHILS # BLD AUTO: 6.33 K/UL — SIGNIFICANT CHANGE UP (ref 1.4–6.5)
NEUTROPHILS NFR BLD AUTO: 82.7 % — HIGH (ref 42.2–75.2)
NRBC # BLD: 0 /100 WBCS — SIGNIFICANT CHANGE UP (ref 0–0)
PHOSPHATE SERPL-MCNC: 2.9 MG/DL — SIGNIFICANT CHANGE UP (ref 2.1–4.9)
PLATELET # BLD AUTO: 204 K/UL — SIGNIFICANT CHANGE UP (ref 130–400)
PMV BLD: 9.4 FL — SIGNIFICANT CHANGE UP (ref 7.4–10.4)
POTASSIUM SERPL-MCNC: 4.1 MMOL/L — SIGNIFICANT CHANGE UP (ref 3.5–5)
POTASSIUM SERPL-SCNC: 4.1 MMOL/L — SIGNIFICANT CHANGE UP (ref 3.5–5)
RBC # BLD: 2.84 M/UL — LOW (ref 4.2–5.4)
RBC # FLD: 16.4 % — HIGH (ref 11.5–14.5)
SODIUM SERPL-SCNC: 131 MMOL/L — LOW (ref 135–146)
WBC # BLD: 7.66 K/UL — SIGNIFICANT CHANGE UP (ref 4.8–10.8)
WBC # FLD AUTO: 7.66 K/UL — SIGNIFICANT CHANGE UP (ref 4.8–10.8)

## 2024-09-16 RX ORDER — NIFEDIPINE 60 MG/1
60 TABLET, FILM COATED, EXTENDED RELEASE ORAL EVERY 24 HOURS
Refills: 0 | Status: DISCONTINUED | OUTPATIENT
Start: 2024-09-16 | End: 2024-09-17

## 2024-09-16 RX ADMIN — ACETAMINOPHEN 975 MILLIGRAM(S): 325 TABLET ORAL at 11:22

## 2024-09-16 RX ADMIN — Medication 20 MILLIGRAM(S): at 22:35

## 2024-09-16 RX ADMIN — Medication 50 MILLIGRAM(S): at 17:11

## 2024-09-16 RX ADMIN — APIXABAN 5 MILLIGRAM(S): 5 TABLET, FILM COATED ORAL at 17:11

## 2024-09-16 RX ADMIN — APIXABAN 5 MILLIGRAM(S): 5 TABLET, FILM COATED ORAL at 06:16

## 2024-09-16 RX ADMIN — ACETAMINOPHEN 975 MILLIGRAM(S): 325 TABLET ORAL at 22:35

## 2024-09-16 RX ADMIN — ACETAMINOPHEN 975 MILLIGRAM(S): 325 TABLET ORAL at 14:54

## 2024-09-16 RX ADMIN — CHLORHEXIDINE GLUCONATE 1 APPLICATION(S): 40 SOLUTION TOPICAL at 06:18

## 2024-09-16 RX ADMIN — NIFEDIPINE 60 MILLIGRAM(S): 60 TABLET, FILM COATED, EXTENDED RELEASE ORAL at 23:56

## 2024-09-16 RX ADMIN — METOPROLOL TARTRATE 25 MILLIGRAM(S): 100 TABLET ORAL at 06:17

## 2024-09-16 RX ADMIN — OXYCODONE HYDROCHLORIDE 5 MILLIGRAM(S): 5 TABLET ORAL at 16:57

## 2024-09-16 RX ADMIN — METOPROLOL TARTRATE 25 MILLIGRAM(S): 100 TABLET ORAL at 17:11

## 2024-09-16 RX ADMIN — Medication 50 MILLIGRAM(S): at 06:16

## 2024-09-16 NOTE — PROGRESS NOTE ADULT - SUBJECTIVE AND OBJECTIVE BOX
GENERAL SURGERY PROGRESS NOTE     Patient: SKYLAR ORTIZ , 79y ([birth year])Female   MRN: [id number]  Location: [address]  Visit: 09-04-24 Inpatient  Date: 09-16-24 @ 01:11        Admitted :09-04-24 (12d)  LOS: 12d    Procedure/Dx/Injuries: Pelvic mass    Colonic mass    Abdominal mass    Suspected pulmonary embolism    Exploratory laparotomy by gynecology    JUSTA & BSO    Omentectomy    Small bowel resection    Resection, transverse colon    Appendectomy    Debulking of pelvic tumor         Events of past 24 hours: Patient seen and examined at bedside. Patient denies any nausea, vomiting and is not passing gas or having bowel movements. Patient was started on clear liquid diet and tolerating.   >>> <<<>>> <<<>>> <<<>>> <<<>>> <<<>>> <<<>>> <<<>>> <<<>>> <<<>>> <<<    Vitals:   T(F): 98.5 (09-16-24 @ 00:08), Max: 98.5 (09-16-24 @ 00:08)  HR: 64 (09-16-24 @ 00:08)  BP: 154/67 (09-16-24 @ 00:08)  RR: 18 (09-16-24 @ 00:08)  SpO2: 97% (09-16-24 @ 00:08)      PHYSICAL EXAM:  General Appearance: NAD, lying in bed    Lungs: No increased work of breathing or accessory muscle use. Symmetric chest wall rise and fall. Saturating well on room air  Abdomen:  Soft, mildly distended, tender. Midline incision with staples clean, dry and intact with no bleeding or drainage  MSK/Extremities: Warm & well-perfused.   Skin: Warm, dry. No jaundice.   >>> <<<>>> <<<>>> <<<>>> <<<>>> <<<>>> <<<>>> <<<>>> <<<>>> <<<>>> <<<   Is & Os:   Diet, Clear Liquid    Fluids:     09-14-24 @ 07:01  -  09-15-24 @ 07:00  --------------------------------------------------------  IN:    dextrose 5% + sodium chloride 0.45%: 1575 mL    Lactated Ringers: 200 mL  Total IN: 1775 mL    OUT:    Indwelling Catheter - Urethral (mL): 1160 mL    Oral Fluid: 0 mL    Voided (mL): 0 mL  Total OUT: 1160 mL    Total NET: 615 mL          Bowel Movement: -  Flatus: -  >>> <<<>>> <<<>>> <<<>>> <<<>>> <<<>>> <<<>>> <<<>>> <<<>>> <<<>>> <<<    MEDICATIONS  (STANDING):  acetaminophen     Tablet .. 975 milliGRAM(s) Oral every 6 hours  apixaban 5 milliGRAM(s) Oral every 12 hours  atorvastatin 20 milliGRAM(s) Oral at bedtime  chlorhexidine 2% Cloths 1 Application(s) Topical <User Schedule>  dextrose 5% + sodium chloride 0.45%. 1000 milliLiter(s) (75 mL/Hr) IV Continuous <Continuous>  hydrALAZINE 50 milliGRAM(s) Oral every 12 hours  insulin lispro (ADMELOG) corrective regimen sliding scale   SubCutaneous Before meals and at bedtime  metoprolol tartrate 25 milliGRAM(s) Oral two times a day    MEDICATIONS  (PRN):  oxyCODONE    IR 5 milliGRAM(s) Oral every 4 hours PRN Severe Pain (7 - 10)  oxyCODONE    IR 2.5 milliGRAM(s) Oral every 4 hours PRN Moderate Pain (4 - 6)      DVT PROPHYLAXIS:   GI PROPHYLAXIS:   ANTICOAGULATION:   ANTIBIOTICS:      >>> <<<>>> <<<>>> <<<>>> <<<>>> <<<>>> <<<>>> <<<>>> <<<>>> <<<>>> <<<        LAB/STUDIES:  Labs:  CAPILLARY BLOOD GLUCOSE      POCT Blood Glucose.: 132 mg/dL (15 Sep 2024 22:28)  POCT Blood Glucose.: 162 mg/dL (15 Sep 2024 17:56)  POCT Blood Glucose.: 135 mg/dL (15 Sep 2024 11:22)  POCT Blood Glucose.: 131 mg/dL (15 Sep 2024 07:56)  POCT Blood Glucose.: 129 mg/dL (15 Sep 2024 05:38)                          9.7    9.71  )-----------( 217      ( 15 Sep 2024 11:50 )             28.9       Auto Neutrophil %: 87.8 % (09-15-24 @ 11:50)  Auto Immature Granulocyte %: 0.9 % (09-15-24 @ 11:50)    09-15    132<L>  |  100  |  10  ----------------------------<  126<H>  4.1   |  22  |  0.9      Calcium: 7.5 mg/dL (09-15-24 @ 11:50)      LFTs:     Blood Gas Arterial, Lactate: 1.4 mmol/L (09-13-24 @ 11:38)  Blood Gas Arterial, Lactate: 1.5 mmol/L (09-13-24 @ 09:56)  Blood Gas Arterial, Lactate: 3.4 mmol/L (09-13-24 @ 05:03)    ABG - ( 13 Sep 2024 11:38 )  pH: 7.40  /  pCO2: 32    /  pO2: 147   / HCO3: 20    / Base Excess: -4.4  /  SaO2: 100.0           ABG - ( 13 Sep 2024 09:56 )  pH: 7.37  /  pCO2: 32    /  pO2: 169   / HCO3: 18    / Base Excess: -6.1  /  SaO2: 99.8            ABG - ( 13 Sep 2024 05:03 )  pH: 7.37  /  pCO2: 29    /  pO2: 139   / HCO3: 17    / Base Excess: -7.6  /  SaO2: 99.6              Coags:            Urinalysis Basic - ( 15 Sep 2024 11:50 )    Color: x / Appearance: x / SG: x / pH: x  Gluc: 126 mg/dL / Ketone: x  / Bili: x / Urobili: x   Blood: x / Protein: x / Nitrite: x   Leuk Esterase: x / RBC: x / WBC x   Sq Epi: x / Non Sq Epi: x / Bacteria: x

## 2024-09-16 NOTE — PROGRESS NOTE ADULT - ASSESSMENT
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      # abdomino-pelvic mass, likely arising from right adnexa, s/p Ex-Lap and bowel resections (as above)  - S/p 2u PRBC intraop. S/p 24hrs of cefotetan  - 9/12 s/p pressors intraop and shortly postop. Extubated 9/13.   - H/h downtrending, now stable at 9.7. F/u AM Labs.   - PT previously following, will call again for follow up  - final pathology pending results  [facility]  - tolerating CLD  - encouraged ambulation. starting to pass flatus  - pain management with tylenol + oxy PRN  - [facility] also following, reccs appreciated    # bilateral  LE DVTs  - VA Duplex LE bilateral: Right lower extremity DVT in peroneal vein and gastrocnemius veins. Left lower extremity DVT in gastrocnemius, posterior tibial, and peroneal veins.  - Previously on Heparin Drip. IVC filters placed by IR on 9/11 in preparation for the OR.   - NO SCDS  - c/w Eliquis 5mg BID. Per vascular consult, continue AC for 3 months.    #Prediabetes, A1C now 5.4%  - FS ACqHs. ISS.     #Ascending Aortic Aneurysm ~4cm. HTN.   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      # abdomino-pelvic mass, likely arising from right adnexa, s/p Ex-Lap and bowel resections (as above)  - S/p 2u PRBC intraop. S/p 24hrs of cefotetan  - 9/12 s/p pressors intraop and shortly postop. Extubated 9/13.   - H/h downtrending, now stable at 9.7. F/u AM Labs.   - PT previously following, will call again for follow up  - final pathology pending results  [facility]  - tolerating CLD  - encouraged ambulation. starting to pass flatus  - pain management with tylenol + oxy PRN  - [facility] also following, reccs appreciated    # bilateral  LE DVTs  - VA Duplex LE bilateral: Right lower extremity DVT in peroneal vein and gastrocnemius veins. Left lower extremity DVT in gastrocnemius, posterior tibial, and peroneal veins.  - Previously on Heparin Drip. IVC filters placed by IR on 9/11 in preparation for the OR.   - NO SCDS  - c/w Eliquis 5mg BID. Per vascular consult, continue AC for 3 months.    #Prediabetes, A1C now 5.4%  - FS ACqHs. ISS.     #Ascending Aortic Aneurysm ~4cm. HTN.   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 S/p 24hrs of cefotetan  - 9/12 s/p pressors intraop and shortly postop. Extubated 9/13.   - H/h downtrending, now stable at 9.7. F/u AM Labs.   - PT previously following, will call again for follow up  - final pathology pending results  [facility]  - tolerating CLD  - encouraged ambulation. starting to pass flatus  - pain management with tylenol + oxy PRN  - [facility] also following, reccs appreciated    # bilateral  LE DVTs  - VA Duplex LE bilateral: Right lower extremity DVT in peroneal vein and gastrocnemius veins. Left lower extremity DVT in gastrocnemius, posterior tibial, and peroneal veins.  - Previously on Heparin Drip. IVC filters placed by IR on 9/11 in preparation for the OR.   - NO SCDS  - c/w Eliquis 5mg BID. Per vascular consult, continue AC for 3 months.    #Prediabetes, A1C now 5.4%  - FS ACqHs. ISS.     #Ascending Aortic Aneurysm ~4cm. HTN.   - S/p normal TTE otherwise on 9/11  - Current meds: hydralazine 50mg BID, metoprolol 20mg BID   - s/p Lasix 10mg IVP on 9/15 with improvement in BPs

## 2024-09-16 NOTE — PROGRESS NOTE ADULT - ASSESSMENT
AMERICOEZ ORTIZ is [id number] s/p ExLap, JUSTA, BSO, omentectomy, appendectomy, small bowel resection+reanastomosis, transverse colon resection+reanastomosis, tumor debulking with General surgery intra-op consult for segmental transverse colectomy with anastomosis on 9/12.    PLAN:  - Monitor vitals  - Monitor labs and replete as necessary  - Follow up AM labs  - Monitor for bowel function  - Recommend dulcolax suppository   - Recommend morning KUB for 9/16  - Continue multimodal pain regimen  - Encourage ambulation as tolerated  - Rest of care per primary team      BLUE TEAM SPECTRA 1958

## 2024-09-16 NOTE — PROGRESS NOTE ADULT - SUBJECTIVE AND OBJECTIVE BOX
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 Resting comfortably. She reports her pain is better again today and feels her abdomen is less distended. She ambulated with her walker around the halls yesterday and voided. Passed flatus x3 last night. Tolerating CLD, had homemade soup yesterday without difficulty. Denies HA, CP, SOB, nausea, vomiting, fevers, chills, vaginal bleeding.     Physical Exam  Vital Signs Last 24 Hrs  T(C): 37 (16 Sep 2024 04:36), Max: 37 (16 Sep 2024 04:36)  T(F): 98.6 (16 Sep 2024 04:36), Max: 98.6 (16 Sep 2024 04:36)  HR: 56 (16 Sep 2024 04:36) (56 - 64)  BP: 157/62 (16 Sep 2024 04:36) (142/69 - 188/87)  RR: 18 (16 Sep 2024 04:36) (16 - 18)  SpO2: 98% (16 Sep 2024 04:36) (96% - 98%)    Parameters below as of 16 Sep 2024 04:36  Patient On (Oxygen Delivery Method): room air    Physical exam:  General - AAOx3, NAD  Abdomen   - Soft, nontender, mildly distended, +BS x4 quadrants  - Clean, dry, intact vertical ex-lap incision with surgical staples   Pelvis/Vagina - No bleeding, deferred    Labs:                        9.7    9.71  )-----------( 217      ( 15 Sep 2024 11:50 )             28.9                  8.3    9.49  )-----------( 180      ( 09-14 @ 21:19 )             24.6                7.7    9.27  )-----------( 161      ( 09-14 @ 11:40 )             22.9                7.5    8.82  )-----------( 158      ( 09-14 @ 05:32 )             22.5                11.4   10.91 )-----------( 222      ( 09-12 @ 21:35 )             34.0                11.6   3.99  )-----------( 207      ( 09-12 @ 14:12 )             35.1     09-15    132<L>  |  100  |  10  ----------------------------<  126<H>  4.1   |  22  |  0.9    Ca    7.5<L>      15 Sep 2024 11:50  Phos  3.5     09-15  Mg     2.0     09-15      14 Sep 2024 21:19    136    |  105    |  14     ----------------------------<  102<H>  4.1     |  22     |  1.1    14 Sep 2024 05:32    136    |  105    |  17     ----------------------------<  91     4.2     |  22     |  1.3      Ca    7.6<L>      14 Sep 2024 21:19  Ca    7.7<L>      14 Sep 2024 05:32  Phos  2.5       14 Sep 2024 21:19  Phos  3.8       14 Sep 2024 05:32  Mg     1.9       14 Sep 2024 21:19  Mg     2.2       14 Sep 2024 05:32    TPro  3.8<L>  /  Alb  2.4<L>  /  TBili  0.7    /  DBili  x      /  AST  31     /  ALT  15     /  AlkPhos  47     12 Sep 2024 14:12    MEDICATIONS  (STANDING):  acetaminophen     Tablet .. 975 milliGRAM(s) Oral every 6 hours  apixaban 5 milliGRAM(s) Oral every 12 hours  atorvastatin 20 milliGRAM(s) Oral at bedtime  chlorhexidine 2% Cloths 1 Application(s) Topical <User Schedule>  dextrose 5% + sodium chloride 0.45%. 1000 milliLiter(s) (75 mL/Hr) IV Continuous <Continuous>  hydrALAZINE 50 milliGRAM(s) Oral every 12 hours  insulin lispro (ADMELOG) corrective regimen sliding scale   SubCutaneous Before meals and at bedtime  metoprolol tartrate 25 milliGRAM(s) Oral two times a day    MEDICATIONS  (PRN):  oxyCODONE    IR 5 milliGRAM(s) Oral every 4 hours PRN Severe Pain (7 - 10)  oxyCODONE    IR 2.5 milliGRAM(s) Oral every 4 hours PRN Moderate Pain (4 - 6)    ---  9/5: 3.9>9.1/27.2<209, ptt 51.5, 130/4.2/96/26/15/1/103, mag 1.9, A pos/neg  estradiol 15. A1C 5.4%  9/6 CA-125 34, CA 19-9 30, CEA 4.8, inhibin A 1.5 (n), inhibin B 19.4  9/8 A pos  9/10  TTE- LVEF 57%, dilatation of the ascending aorta, suboptimally visualized (4.0cm, 2.4cm/m2).  9/11 3.44>8.6/26<233, pt/INR/ptt 11.6/1.02/56.9, 134/4.4/99/24/14/1.1/92, mag 1.9, A pos/neg  @1700     9/12 (PACU) 3.99>11.6/35.1<207, 134/3.9/105/17/12/0.9<135, AST/ALT 31/15, Mg 1.5, Ph 4.3, ABG: pH 7.3, BE -9.2  9/12 @2135 10.91>11.4/34<222, 132/4.5/102/14/15/1.1<210, Mg 2.6, Phos 4.9  9/14 @0430 8.81>7.5/22.5<158, 136/4.2/105/22/17/1.3<91, Mg 2.2, Phos 3.8, A pos  @1100 9.27>7.7/22.9<161  @2000 9.49>8.3/24.6<180, 136/4.1/105/22/14/1.1<102, Mg 1.9, Phos 2.5  --  9/4 CT abdomen: Large multiloculated cystic mass with areas of soft tissue, measuring 24.7 x 12.5 x 22.3 cm, extending from the pelvis into the upper abdomen. Findings are concerning for malignancy; felt to likely be gynecologic in etiology, but this mass remains indeterminate. Questionable filling defects in both the right and left common femoral vein and deep venous thrombosis is not excluded. Further evaluation with ultrasound is recommended. Mild bilateral hydronephrosis. Indeterminate multiple fluid-filled/cystic lesions within the abdomen measuring up to 6.5 cm, felt to likely be related to cystic metastatic disease. Soft tissue density within the transverse colon adjacent to the splenic flexure on image 52 series 2. Findings may be related to focal stool. Underlying neoplasm cannot be excluded. Subcutaneous nodule measuring 1.8 cm and subcentimeter sclerotic focus in T12, metastatic disease is not excluded. Small right pleural effusion. Ascending aorta measures 4.5 cm, aneurysmal. 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  9/6 TVUS: Large heterogeneous mass with measuring 22.6 x 16.9 x 20.3 cm within the pelvic cavity. The mass obscures visualization of the uterus and ovaries.  9/12 PACU CXR: No acute pulmonary process.  9/12 PACU EKG: Diagnosis Line Sinus bradycardia. Anterior infarct , age undetermined. T wave abnormality, consider inferior ischemia. Prolonged QT    < from: Xray Kidney Ureter Bladder (09.15.24 @ 05:43) >  FINDINGS/  IMPRESSION:    Diffusely gas-distended loops of bowel, nonspecific pattern, likely   postoperative ileus. IVC filter noted. Midline laparotomy skin staples.   Degenerative changes in the spine and hips.    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 Resting comfortably. She reports her pain is better again today and feels her abdomen is less distended. She ambulated with her walker around the halls yesterday and voided. Passed flatus x3 last night. Tolerating CLD, had homemade soup yesterday without difficulty. Denies HA, CP, SOB, nausea, vomiting, fevers, chills, vaginal bleeding.     Physical Exam  Vital Signs Last 24 Hrs  T(C): 37 (16 Sep 2024 04:36), Max: 37 (16 Sep 2024 04:36)  T(F): 98.6 (16 Sep 2024 04:36), Max: 98.6 (16 Sep 2024 04:36)  HR: 56 (16 Sep 2024 04:36) (56 - 64)  BP: 157/62 (16 Sep 2024 04:36) (142/69 - 188/87)  RR: 18 (16 Sep 2024 04:36) (16 - 18)  SpO2: 98% (16 Sep 2024 04:36) (96% - 98%)    Parameters below as of 16 Sep 2024 04:36  Patient On (Oxygen Delivery Method): room air    Physical exam:  General - AAOx3, NAD  Abdomen   - Soft, nontender, mildly distended, +BS x4 quadrants  - Clean, dry, intact vertical ex-lap incision with surgical staples   Pelvis/Vagina - No bleeding, deferred    Labs:                        9.7    9.71  )-----------( 217      ( 15 Sep 2024 11:50 )             28.9                  8.3    9.49  )-----------( 180      ( 09-14 @ 21:19 )             24.6                7.7    9.27  )-----------( 161      ( 09-14 @ 11:40 )             22.9                7.5    8.82  )-----------( 158      ( 09-14 @ 05:32 )             22.5                11.4   10.91 )-----------( 222      ( 09-12 @ 21:35 )             34.0                11.6   3.99  )-----------( 207      ( 09-12 @ 14:12 )             35.1     09-15    132<L>  |  100  |  10  ----------------------------<  126<H>  4.1   |  22  |  0.9    Ca    7.5<L>      15 Sep 2024 11:50  Phos  3.5     09-15  Mg     2.0     09-15      14 Sep 2024 21:19    136    |  105    |  14     ----------------------------<  102<H>  4.1     |  22     |  1.1    14 Sep 2024 05:32    136    |  105    |  17     ----------------------------<  91     4.2     |  22     |  1.3      Ca    7.6<L>      14 Sep 2024 21:19  Ca    7.7<L>      14 Sep 2024 05:32  Phos  2.5       14 Sep 2024 21:19  Phos  3.8       14 Sep 2024 05:32  Mg     1.9       14 Sep 2024 21:19  Mg     2.2       14 Sep 2024 05:32    TPro  3.8<L>  /  Alb  2.4<L>  /  TBili  0.7    /  DBili  x      /  AST  31     /  ALT  15     /  AlkPhos  47     12 Sep 2024 14:12    MEDICATIONS  (STANDING):  acetaminophen     Tablet .. 975 milliGRAM(s) Oral every 6 hours  apixaban 5 milliGRAM(s) Oral every 12 hours  atorvastatin 20 milliGRAM(s) Oral at bedtime  chlorhexidine 2% Cloths 1 Application(s) Topical <User Schedule>  dextrose 5% + sodium chloride 0.45%. 1000 milliLiter(s) (75 mL/Hr) IV Continuous <Continuous>  hydrALAZINE 50 milliGRAM(s) Oral every 12 hours  insulin lispro (ADMELOG) corrective regimen sliding scale   SubCutaneous Before meals and at bedtime  metoprolol tartrate 25 milliGRAM(s) Oral two times a day    MEDICATIONS  (PRN):  oxyCODONE    IR 5 milliGRAM(s) Oral every 4 hours PRN Severe Pain (7 - 10)  oxyCODONE    IR 2.5 milliGRAM(s) Oral every 4 hours PRN Moderate Pain (4 - 6)    ---  9/5: 3.9>9.1/27.2<209, ptt 51.5, 130/4.2/96/26/15/1/103, mag 1.9, A pos/neg  estradiol 15. A1C 5.4%  9/6 CA-125 34, CA 19-9 30, CEA 4.8, inhibin A 1.5 (n), inhibin B 19.4  9/8 A pos  9/10  TTE- LVEF 57%, dilatation of the ascending aorta, suboptimally visualized (4.0cm, 2.4cm/m2).  9/11 3.44>8.6/26<233, pt/INR/ptt 11.6/1.02/56.9, 134/4.4/99/24/14/1.1/92, mag 1.9, A pos/neg  @1700     9/12 (PACU) 3.99>11.6/35.1<207, 134/3.9/105/17/12/0.9<135, AST/ALT 31/15, Mg 1.5, Ph 4.3, ABG: pH 7.3, BE -9.2  9/12 @2135 10.91>11.4/34<222, 132/4.5/102/14/15/1.1<210, Mg 2.6, Phos 4.9  9/14 @0430 8.81>7.5/22.5<158, 136/4.2/105/22/17/1.3<91, Mg 2.2, Phos 3.8, A pos  @1100 9.27>7.7/22.9<161  @2000 9.49>8.3/24.6<180, 136/4.1/105/22/14/1.1<102, Mg 1.9, Phos 2.5  --  9/4 CT abdomen: Large multiloculated cystic mass with areas of soft tissue, measuring 24.7 x 12.5 x 22.3 cm, extending from the pelvis into the upper abdomen. Findings are concerning for malignancy; felt to likely be gynecologic in etiology, but this mass remains indeterminate. Questionable filling defects in both the right and left common femoral vein and deep venous thrombosis is not excluded. Further evaluation with ultrasound is recommended. Mild bilateral hydronephrosis. Indeterminate multiple fluid-filled/cystic lesions within the abdomen measuring up to 6.5 cm, felt to likely be related to cystic metastatic disease. Soft tissue density within the transverse colon adjacent to the splenic flexure on image 52 series 2. Findings may be related to focal stool. Underlying neoplasm cannot be excluded. Subcutaneous nodule measuring 1.8 cm and subcentimeter sclerotic focus in T12, metastatic disease is not excluded. Small right pleural effusion. Ascending aorta measures 4.5 cm, aneurysmal. 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  9/6 TVUS: Large heterogeneous mass with measuring 22.6 x 16.9 x 20.3 cm within the pelvic cavity. The mass obscures visualization of the uterus and ovaries.  9/12 PACU CXR: No acute pulmonary process.  9/12 PACU EKG: Diagnosis Line Sinus bradycardia. Anterior infarct , age undetermined. T wave abnormality, consider inferior ischemia. Prolonged QT    < from: Xray Kidney Ureter Bladder (09.15.24 @ 05:43) >  FINDINGS/  IMPRESSION:    Diffusely gas-distended loops of bowel, nonspecific pattern, likely   postoperative ileus. IVC filter noted. Midline laparotomy skin staples.   Degenerative changes in the spine and hips.    < end of copied text >

## 2024-09-17 ENCOUNTER — TRANSCRIPTION ENCOUNTER (OUTPATIENT)
Age: 79
End: 2024-09-17

## 2024-09-17 LAB
ANION GAP SERPL CALC-SCNC: 8 MMOL/L — SIGNIFICANT CHANGE UP (ref 7–14)
BASOPHILS # BLD AUTO: 0.01 K/UL — SIGNIFICANT CHANGE UP (ref 0–0.2)
BASOPHILS NFR BLD AUTO: 0.2 % — SIGNIFICANT CHANGE UP (ref 0–1)
BUN SERPL-MCNC: 13 MG/DL — SIGNIFICANT CHANGE UP (ref 10–20)
CALCIUM SERPL-MCNC: 7.4 MG/DL — LOW (ref 8.4–10.5)
CHLORIDE SERPL-SCNC: 102 MMOL/L — SIGNIFICANT CHANGE UP (ref 98–110)
CO2 SERPL-SCNC: 21 MMOL/L — SIGNIFICANT CHANGE UP (ref 17–32)
CREAT SERPL-MCNC: 0.9 MG/DL — SIGNIFICANT CHANGE UP (ref 0.7–1.5)
EGFR: 66 ML/MIN/1.73M2 — SIGNIFICANT CHANGE UP
EOSINOPHIL # BLD AUTO: 0 K/UL — SIGNIFICANT CHANGE UP (ref 0–0.7)
EOSINOPHIL NFR BLD AUTO: 0 % — SIGNIFICANT CHANGE UP (ref 0–8)
GLUCOSE BLDC GLUCOMTR-MCNC: 125 MG/DL — HIGH (ref 70–99)
GLUCOSE BLDC GLUCOMTR-MCNC: 138 MG/DL — HIGH (ref 70–99)
GLUCOSE BLDC GLUCOMTR-MCNC: 151 MG/DL — HIGH (ref 70–99)
GLUCOSE BLDC GLUCOMTR-MCNC: 156 MG/DL — HIGH (ref 70–99)
GLUCOSE SERPL-MCNC: 97 MG/DL — SIGNIFICANT CHANGE UP (ref 70–99)
HCT VFR BLD CALC: 26.9 % — LOW (ref 37–47)
HGB BLD-MCNC: 8.8 G/DL — LOW (ref 12–16)
IMM GRANULOCYTES NFR BLD AUTO: 1.3 % — HIGH (ref 0.1–0.3)
LYMPHOCYTES # BLD AUTO: 0.96 K/UL — LOW (ref 1.2–3.4)
LYMPHOCYTES # BLD AUTO: 15 % — LOW (ref 20.5–51.1)
MAGNESIUM SERPL-MCNC: 1.7 MG/DL — LOW (ref 1.8–2.4)
MCHC RBC-ENTMCNC: 29.1 PG — SIGNIFICANT CHANGE UP (ref 27–31)
MCHC RBC-ENTMCNC: 32.7 G/DL — SIGNIFICANT CHANGE UP (ref 32–37)
MCV RBC AUTO: 89.1 FL — SIGNIFICANT CHANGE UP (ref 81–99)
MONOCYTES # BLD AUTO: 0.52 K/UL — SIGNIFICANT CHANGE UP (ref 0.1–0.6)
MONOCYTES NFR BLD AUTO: 8.2 % — SIGNIFICANT CHANGE UP (ref 1.7–9.3)
NEUTROPHILS # BLD AUTO: 4.81 K/UL — SIGNIFICANT CHANGE UP (ref 1.4–6.5)
NEUTROPHILS NFR BLD AUTO: 75.3 % — HIGH (ref 42.2–75.2)
NRBC # BLD: 0 /100 WBCS — SIGNIFICANT CHANGE UP (ref 0–0)
PHOSPHATE SERPL-MCNC: 2.6 MG/DL — SIGNIFICANT CHANGE UP (ref 2.1–4.9)
PLATELET # BLD AUTO: 228 K/UL — SIGNIFICANT CHANGE UP (ref 130–400)
PMV BLD: 9.6 FL — SIGNIFICANT CHANGE UP (ref 7.4–10.4)
POTASSIUM SERPL-MCNC: 3.9 MMOL/L — SIGNIFICANT CHANGE UP (ref 3.5–5)
POTASSIUM SERPL-SCNC: 3.9 MMOL/L — SIGNIFICANT CHANGE UP (ref 3.5–5)
RBC # BLD: 3.02 M/UL — LOW (ref 4.2–5.4)
RBC # FLD: 16.2 % — HIGH (ref 11.5–14.5)
SODIUM SERPL-SCNC: 131 MMOL/L — LOW (ref 135–146)
WBC # BLD: 6.38 K/UL — SIGNIFICANT CHANGE UP (ref 4.8–10.8)
WBC # FLD AUTO: 6.38 K/UL — SIGNIFICANT CHANGE UP (ref 4.8–10.8)

## 2024-09-17 RX ORDER — APIXABAN 5 MG/1
1 TABLET, FILM COATED ORAL
Qty: 180 | Refills: 0
Start: 2024-09-17 | End: 2024-12-15

## 2024-09-17 RX ORDER — FUROSEMIDE 40 MG
1 TABLET ORAL
Refills: 0 | DISCHARGE

## 2024-09-17 RX ORDER — SPIRONOLACTONE 25 MG/1
1 TABLET, FILM COATED ORAL
Refills: 0 | DISCHARGE

## 2024-09-17 RX ORDER — BISOPROLOL FUMARATE 5 MG/1
2.5 TABLET, COATED ORAL
Refills: 0 | DISCHARGE

## 2024-09-17 RX ORDER — NIFEDIPINE 60 MG/1
1 TABLET, FILM COATED, EXTENDED RELEASE ORAL
Qty: 45 | Refills: 0
Start: 2024-09-17 | End: 2024-10-31

## 2024-09-17 RX ORDER — METOPROLOL TARTRATE 100 MG/1
1 TABLET ORAL
Qty: 90 | Refills: 0
Start: 2024-09-17 | End: 2024-10-31

## 2024-09-17 RX ORDER — HYDRALAZINE HCL 50 MG
1 TABLET ORAL
Qty: 90 | Refills: 0
Start: 2024-09-17 | End: 2024-10-31

## 2024-09-17 RX ORDER — FERROUS SULFATE 325(65) MG
0 TABLET ORAL
Refills: 0 | DISCHARGE

## 2024-09-17 RX ORDER — DICLOFENAC POTASSIUM 25 MG/1
0 CAPSULE, LIQUID FILLED ORAL
Refills: 0 | DISCHARGE

## 2024-09-17 RX ORDER — NIFEDIPINE 60 MG/1
60 TABLET, FILM COATED, EXTENDED RELEASE ORAL EVERY 24 HOURS
Refills: 0 | Status: DISCONTINUED | OUTPATIENT
Start: 2024-09-17 | End: 2024-09-18

## 2024-09-17 RX ORDER — OXYCODONE HYDROCHLORIDE 15 MG/1
1 TABLET ORAL
Refills: 0 | DISCHARGE

## 2024-09-17 RX ADMIN — Medication 20 MILLIGRAM(S): at 21:25

## 2024-09-17 RX ADMIN — NIFEDIPINE 60 MILLIGRAM(S): 60 TABLET, FILM COATED, EXTENDED RELEASE ORAL at 21:25

## 2024-09-17 RX ADMIN — APIXABAN 5 MILLIGRAM(S): 5 TABLET, FILM COATED ORAL at 18:00

## 2024-09-17 RX ADMIN — Medication 2: at 12:29

## 2024-09-17 RX ADMIN — Medication 10 MILLIGRAM(S): at 12:30

## 2024-09-17 RX ADMIN — CHLORHEXIDINE GLUCONATE 1 APPLICATION(S): 40 SOLUTION TOPICAL at 06:03

## 2024-09-17 RX ADMIN — ACETAMINOPHEN 975 MILLIGRAM(S): 325 TABLET ORAL at 18:00

## 2024-09-17 RX ADMIN — Medication 75 MILLILITER(S): at 06:01

## 2024-09-17 RX ADMIN — ACETAMINOPHEN 975 MILLIGRAM(S): 325 TABLET ORAL at 06:01

## 2024-09-17 RX ADMIN — ACETAMINOPHEN 975 MILLIGRAM(S): 325 TABLET ORAL at 11:05

## 2024-09-17 RX ADMIN — METOPROLOL TARTRATE 25 MILLIGRAM(S): 100 TABLET ORAL at 18:00

## 2024-09-17 RX ADMIN — METOPROLOL TARTRATE 25 MILLIGRAM(S): 100 TABLET ORAL at 06:01

## 2024-09-17 RX ADMIN — APIXABAN 5 MILLIGRAM(S): 5 TABLET, FILM COATED ORAL at 06:01

## 2024-09-17 RX ADMIN — Medication 100 GRAM(S): at 11:05

## 2024-09-17 RX ADMIN — Medication 50 MILLIGRAM(S): at 06:04

## 2024-09-17 RX ADMIN — Medication 50 MILLIGRAM(S): at 21:25

## 2024-09-17 RX ADMIN — Medication 2: at 21:33

## 2024-09-17 RX ADMIN — ACETAMINOPHEN 975 MILLIGRAM(S): 325 TABLET ORAL at 21:25

## 2024-09-17 NOTE — DISCHARGE NOTE PROVIDER - CARE PROVIDER_API CALL
Tab Weiss  Gynecologic Oncology  [address] [facility], [address] [place] [address]  Phone: [phone]  Fax: [phone]  Scheduled Appointment: 09/20/2024 10:00 AM    Chao Brar  Hematology  [address] [address]  Phone: [phone]  Fax: [phone]  Follow Up Time: 1 week    Stephan Casey  Vascular Surgery  [address] [place] [address], [address]  [place] [address]  Phone: [phone]  Fax: [phone]  Follow Up Time: 1 month    Edis Lemus  Interventional Radiology and Diagnostic Radiology  [address] [place] [address]  Phone: [phone]  Fax: [phone]  Follow Up Time: 2 months   Tab Weiss  Gynecologic Oncology  [address] [facility], [address] [place] [address]  Phone: [phone]  Fax: [phone]  Scheduled Appointment: 09/25/2024 10:00 AM    Chao Brar  Hematology  [address] [address]  Phone: [phone]  Fax: [phone]  Follow Up Time: 1 week    Stephan Casey  Vascular Surgery  [address] [place] [address], [address]  [place] [address]  Phone: [phone]  Fax: [phone]  Follow Up Time: 1 month    Edis Lemus  Interventional Radiology and Diagnostic Radiology  [address] [place] [address]  Phone: [phone]  Fax: [phone]  Follow Up Time: 2 months    Rubén Jefferson  Internal Medicine  [address] [place] [address]  Phone: [phone]  Fax: [phone]  Follow Up Time:

## 2024-09-17 NOTE — DISCHARGE NOTE PROVIDER - NSDCCPCAREPLAN_GEN_ALL_CORE_FT
PRINCIPAL DISCHARGE DIAGNOSIS  Diagnosis: Abdominal mass  Assessment and Plan of Treatment:       SECONDARY DISCHARGE DIAGNOSES  Diagnosis: Metastatic disease  Assessment and Plan of Treatment:

## 2024-09-17 NOTE — PROGRESS NOTE ADULT - SUBJECTIVE AND OBJECTIVE BOX
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 Resting comfortably. She denies any pain. Continues to feel like her abdomen is distended but not painful.  She ambulated with her walker a few times around the halls yesterday. Continues to pass flatus but did not have a bowel movement yet. Tolerating regular diet. Reports minimal nausea that passes spontaneously and does not inhibit her from eating, no vomiting.  Denies HA, CP, SOB, fevers, chills, vaginal bleeding.     Physical Exam  Vital Signs Last 24 Hrs  T(C): 37.1 (17 Sep 2024 00:30), Max: 37.1 (16 Sep 2024 22:05)  T(F): 98.8 (17 Sep 2024 00:30), Max: 98.8 (17 Sep 2024 00:30)  HR: 65 (17 Sep 2024 05:45) (55 - 68)  BP: 182/78 (17 Sep 2024 05:45) (156/71 - 184/81)  BP(mean): --  RR: 18 (17 Sep 2024 00:30) (18 - 18)  SpO2: 97% (17 Sep 2024 00:30) (97% - 99%)    Parameters below as of 16 Sep 2024 22:05  Patient On (Oxygen Delivery Method): room air    Physical exam:  General - AAOx3, NAD  Abdomen   - Soft, nontender, mildly distended   - Clean, dry, intact vertical ex-lap incision with surgical staples   Pelvis/Vagina - No bleeding, deferred    Labs:                          8.3    7.66  )-----------( 204      ( 16 Sep 2024 06:15 )             25.0                           9.7    9.71  )-----------( 217      ( 15 Sep 2024 11:50 )             28.9                  8.3    9.49  )-----------( 180      ( 09-14 @ 21:19 )             24.6                7.7    9.27  )-----------( 161      ( 09-14 @ 11:40 )             22.9                7.5    8.82  )-----------( 158      ( 09-14 @ 05:32 )             22.5                11.4   10.91 )-----------( 222      ( 09-12 @ 21:35 )             34.0                11.6   3.99  )-----------( 207      ( 09-12 @ 14:12 )             35.1     09-16    131<L>  |  102  |  12  ----------------------------<  103<H>  4.1   |  21  |  0.8    Ca    7.3<L>      16 Sep 2024 06:15  Phos  2.9     09-16  Mg     1.9     09-16        09-15    132<L>  |  100  |  10  ----------------------------<  126<H>  4.1   |  22  |  0.9    Ca    7.5<L>      15 Sep 2024 11:50  Phos  3.5     09-15  Mg     2.0     09-15      14 Sep 2024 21:19    136    |  105    |  14     ----------------------------<  102<H>  4.1     |  22     |  1.1    14 Sep 2024 05:32    136    |  105    |  17     ----------------------------<  91     4.2     |  22     |  1.3      Ca    7.6<L>      14 Sep 2024 21:19  Ca    7.7<L>      14 Sep 2024 05:32  Phos  2.5       14 Sep 2024 21:19  Phos  3.8       14 Sep 2024 05:32  Mg     1.9       14 Sep 2024 21:19  Mg     2.2       14 Sep 2024 05:32    TPro  3.8<L>  /  Alb  2.4<L>  /  TBili  0.7    /  DBili  x      /  AST  31     /  ALT  15     /  AlkPhos  47     12 Sep 2024 14:12    MEDICATIONS  (STANDING):  acetaminophen     Tablet .. 975 milliGRAM(s) Oral every 6 hours  apixaban 5 milliGRAM(s) Oral every 12 hours  atorvastatin 20 milliGRAM(s) Oral at bedtime  chlorhexidine 2% Cloths 1 Application(s) Topical <User Schedule>  dextrose 5% + sodium chloride 0.45%. 1000 milliLiter(s) (75 mL/Hr) IV Continuous <Continuous>  hydrALAZINE 50 milliGRAM(s) Oral every 12 hours  insulin lispro (ADMELOG) corrective regimen sliding scale   SubCutaneous Before meals and at bedtime  metoprolol tartrate 25 milliGRAM(s) Oral two times a day  NIFEdipine XL 60 milliGRAM(s) Oral every 24 hours    MEDICATIONS  (PRN):  oxyCODONE    IR 2.5 milliGRAM(s) Oral every 4 hours PRN Moderate Pain (4 - 6)  oxyCODONE    IR 5 milliGRAM(s) Oral every 4 hours PRN Severe Pain (7 - 10)    ---  9/5: 3.9>9.1/27.2<209, ptt 51.5, 130/4.2/96/26/15/1/103, mag 1.9, A pos/neg  estradiol 15. A1C 5.4%  9/6 CA-125 34, CA 19-9 30, CEA 4.8, inhibin A 1.5 (n), inhibin B 19.4  9/8 A pos  9/10  TTE- LVEF 57%, dilatation of the ascending aorta, suboptimally visualized (4.0cm, 2.4cm/m2).  9/11 3.44>8.6/26<233, pt/INR/ptt 11.6/1.02/56.9, 134/4.4/99/24/14/1.1/92, mag 1.9, A pos/neg  @1700     9/12 (PACU) 3.99>11.6/35.1<207, 134/3.9/105/17/12/0.9<135, AST/ALT 31/15, Mg 1.5, Ph 4.3, ABG: pH 7.3, BE -9.2  9/12 @2135 10.91>11.4/34<222, 132/4.5/102/14/15/1.1<210, Mg 2.6, Phos 4.9  9/14 @0430 8.81>7.5/22.5<158, 136/4.2/105/22/17/1.3<91, Mg 2.2, Phos 3.8, A pos  @1100 9.27>7.7/22.9<161  @2000 9.49>8.3/24.6<180, 136/4.1/105/22/14/1.1<102, Mg 1.9, Phos 2.5  --  9/4 CT abdomen: Large multiloculated cystic mass with areas of soft tissue, measuring 24.7 x 12.5 x 22.3 cm, extending from the pelvis into the upper abdomen. Findings are concerning for malignancy; felt to likely be gynecologic in etiology, but this mass remains indeterminate. Questionable filling defects in both the right and left common femoral vein and deep venous thrombosis is not excluded. Further evaluation with ultrasound is recommended. Mild bilateral hydronephrosis. Indeterminate multiple fluid-filled/cystic lesions within the abdomen measuring up to 6.5 cm, felt to likely be related to cystic metastatic disease. Soft tissue density within the transverse colon adjacent to the splenic flexure on image 52 series 2. Findings may be related to focal stool. Underlying neoplasm cannot be excluded. Subcutaneous nodule measuring 1.8 cm and subcentimeter sclerotic focus in T12, metastatic disease is not excluded. Small right pleural effusion. Ascending aorta measures 4.5 cm, aneurysmal. 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  9/6 TVUS: Large heterogeneous mass with measuring 22.6 x 16.9 x 20.3 cm within the pelvic cavity. The mass obscures visualization of the uterus and ovaries.  9/12 PACU CXR: No acute pulmonary process.  9/12 PACU EKG: Diagnosis Line Sinus bradycardia. Anterior infarct , age undetermined. T wave abnormality, consider inferior ischemia. Prolonged QT    < from: Xray Kidney Ureter Bladder (09.15.24 @ 05:43) >  FINDINGS/  IMPRESSION:    Diffusely gas-distended loops of bowel, nonspecific pattern, likely   postoperative ileus. IVC filter noted. Midline laparotomy skin staples.   Degenerative changes in the spine and hips.    < end of copied text >

## 2024-09-17 NOTE — DISCHARGE NOTE PROVIDER - NPI NUMBER (FOR SYSADMIN USE ONLY) :
31 yr old with hx of Lupus on Plaquenil presenting with 3 days of LUQ abdominal radiating to back and frontal HA. LBM 4 days ago. Denies blood per rectum.  (+) nausea, fever. (-) Urinary sx, diarrhea, Cp, Sob.  (+)LUQ, LLQ TTP  Vitals: Borderline fever orally 100.3, no tachycardia, no tachypnea or bradypnea. Not meeting sirs criteria at this time.  DDx: Constipation, Pyelo, Nephritis, Viral syndrome.   Will order CBC, CMP. lipase, UA  Will give, reglan, ofirmev, fluids.  Dispo: Pending results. [[id number]],[[id number]],[[id number]],[[id number]] [[id number]],[[id number]],[[id number]],[[id number]],[[id number]]

## 2024-09-17 NOTE — DISCHARGE NOTE PROVIDER - NSDCFUSCHEDAPPT_GEN_ALL_CORE_FT
Tab Weiss  [facility]  [facility] [address]  Scheduled Appointment: 09/25/2024

## 2024-09-17 NOTE — DISCHARGE NOTE PROVIDER - NSDCFUADDINST_GEN_ALL_CORE_FT
Please take tylenol and ibuprofen as needed for pain.   No heavy lifting more than 10 pounds.   Nothing in the vagina for at least 6 weeks.   We will remove the staples from the incision in the office.   If you have heavy vaginal bleeding, significant abdominal pain unrelieved by ambulation/medications, unable to pass a bowel movement after a few days - come to the ER.  If you have fevers >101F or it appears like the wound is draining/infected - come to the ER.  Medications:  Hydralazine 50mg twice a day  Metoprolol 25mg twice a day  Atorvastatin once a day  Eliquis 5mg twice a day for 3 months (for the blood clots) Please take tylenol and ibuprofen as needed for pain.   No heavy lifting more than 10 pounds.   Nothing in the vagina for at least 6 weeks.   We will remove the staples from the incision in the office.   If you have heavy vaginal bleeding, significant abdominal pain unrelieved by ambulation/medications, unable to pass a bowel movement after a few days - come to the ER.  If you have fevers >101F or it appears like the wound is draining/infected - come to the ER.  Medications:  Hydralazine 50mg twice a day  Metoprolol 25mg twice a day  Atorvastatin once a day  Procardia 60XL daily  Eliquis 5mg twice a day for 3 months (for the blood clots)

## 2024-09-17 NOTE — PROGRESS NOTE ADULT - ASSESSMENT
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 S/p 24hrs of cefotetan  - 9/12 s/p pressors intraop and shortly postop. Extubated 9/13.   - PT previously following, they tried to see her yesterday 9/16 but BPs were too high; will try again today  - final pathology pending results  - hemeonc following (Dr Acosta)  - tolerating regular diet  - encouraged ambulation. continues to pass flatus  - pain management with tylenol + oxy PRN  - SurgOn also following, reccs appreciated  - AM labs ordered    # bilateral  LE DVTs  - VA Duplex LE bilateral: Right lower extremity DVT in peroneal vein and gastrocnemius veins. Left lower extremity DVT in gastrocnemius, posterior tibial, and peroneal veins.  - Previously on Heparin Drip. IVC filters placed by IR on 9/11 in preparation for the OR.   - NO SCDS  - c/w Eliquis 5mg BID. Per vascular consult, continue AC for 3 months.    #Prediabetes, A1C now 5.4%  - FS ACqHs. ISS.     #Ascending Aortic Aneurysm ~4cm. HTN.   - S/p normal TTE otherwise on 9/11  - s/p Lasix 10mg IVP on 9/15 with improvement in BPs  - due to continues elevated BPs, added Procardia 60XL daily on 9/16  - Current meds: hydralazine 50mg BID, metoprolol 20mg BID, procardia 60XL daily

## 2024-09-17 NOTE — DISCHARGE NOTE PROVIDER - CARE PROVIDERS DIRECT ADDRESSES
,DirectAddress_Unknown,[email or web address] ,[email or web address],[email or web address],DirectAddress_Unknown

## 2024-09-17 NOTE — PROGRESS NOTE ADULT - ATTENDING COMMENTS
Abdomen remains softly distended, nontender. Wound intact. Patient having flatus.  Would recommend continuing clears for now as likely has ileus.

## 2024-09-17 NOTE — DISCHARGE NOTE PROVIDER - PROVIDER TOKENS
PROVIDER:[TOKEN:[[id number]],SCHEDULEDAPPT:[09/20/2024],SCHEDULEDAPPTTIME:[10:00 AM]],PROVIDER:[TOKEN:[[id number]],FOLLOWUP:[1 week]],PROVIDER:[TOKEN:[[id number]],FOLLOWUP:[1 month]],PROVIDER:[TOKEN:[[id number]],FOLLOWUP:[2 months]] PROVIDER:[TOKEN:[[id number]],SCHEDULEDAPPT:[09/25/2024],SCHEDULEDAPPTTIME:[10:00 AM]],PROVIDER:[TOKEN:[[id number]],FOLLOWUP:[1 week]],PROVIDER:[TOKEN:[[id number]],FOLLOWUP:[1 month]],PROVIDER:[TOKEN:[[id number]],FOLLOWUP:[2 months]],PROVIDER:[TOKEN:[[id number]]]

## 2024-09-17 NOTE — DISCHARGE NOTE PROVIDER - HOSPITAL COURSE
80yo admitted initially to medicine service. Found to have bilateral LE DVTs, s/p Heparin drip. Also found to have large abdominal mass about 25cm. S/p [facility]lap [facility] without 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   Was transferred to the SICU intubated, extubated on POD 1.   Downgraded. On AC Eliquis 5mg BID with IVC filter in place (done preop). On hydralazine 50mg BID. Metoprolol 25mg BID (Has 4cm aortic aneurysm). And procardia 60xl daily. 78yo admitted initially to medicine service. Found to have bilateral LE DVTs, s/p Heparin drip. Also found to have large abdominal mass about 25cm. S/[facility]. 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   Was transferred to the SICU intubated, extubated on POD 1. Downgraded on POD 2. On AC Eliquis 5mg BID with IVC filter in place (done preop). On hydralazine 50mg BID. Metoprolol 25mg BID (Has 4cm aortic aneurysm). Procardia 60xl daily.

## 2024-09-17 NOTE — PROGRESS NOTE ADULT - SUBJECTIVE AND OBJECTIVE BOX
GENERAL SURGERY PROGRESS NOTE     SKYLAR ORTIZ  [address] [place] [address] day :14d    POD:5  Procedure: Exploratory laparotomy by gynecology    JUSTA & BSO    Omentectomy    Small bowel resection    Resection, transverse colon    Appendectomy    Debulking of pelvic tumor      Surgical Attending: Junior Morris  Overnight events: No acute events overnight. Pt reports tolerating diet without any nausea or vomiting. She reports passing gas but now BM. Her abdomen is soft, non tender and moderately distended. Midline incision appears to be healing well with no evidence of bleeding or drainage    T(F): 98.5 (09-17-24 @ 09:21), Max: 98.8 (09-17-24 @ 00:30)  HR: 71 (09-17-24 @ 09:21) (62 - 71)  BP: 157/71 (09-17-24 @ 09:21) (157/71 - 184/81)  ABP: --  ABP(mean): --  RR: 18 (09-17-24 @ 09:21) (18 - 18)  SpO2: 97% (09-17-24 @ 09:21) (97% - 99%)    IN'S / OUT's:      PHYSICAL EXAM:  GENERAL: NAD  CHEST/LUNG: Clear to auscultation bilaterally  HEART: Regular rate and rhythm  ABDOMEN: Soft, Nontender, moderately distended; Midline incision healing well with no evidence of bleeding or drainage  EXTREMITIES:  No clubbing, cyanosis, or edema      LABS  Labs:  CAPILLARY BLOOD GLUCOSE      POCT Blood Glucose.: 125 mg/dL (17 Sep 2024 08:02)  POCT Blood Glucose.: 118 mg/dL (16 Sep 2024 22:18)                          8.8    6.38  )-----------( 228      ( 17 Sep 2024 05:39 )             26.9       Auto Neutrophil %: 75.3 % (09-17-24 @ 05:39)  Auto Immature Granulocyte %: 1.3 % (09-17-24 @ 05:39)    09-17    131[L]  |  102  |  13  ----------------------------<  97  3.9   |  21  |  0.9      Calcium: 7.4 mg/dL (09-17-24 @ 05:39)      LFTs:       ABG - ( 13 Sep 2024 11:38 )  pH: 7.40  /  pCO2: 32    /  pO2: 147   / HCO3: 20    / Base Excess: -4.4  /  SaO2: 100.0           ABG - ( 13 Sep 2024 09:56 )  pH: 7.37  /  pCO2: 32    /  pO2: 169   / HCO3: 18    / Base Excess: -6.1  /  SaO2: 99.8            ABG - ( 13 Sep 2024 05:03 )  pH: 7.37  /  pCO2: 29    /  pO2: 139   / HCO3: 17    / Base Excess: -7.6  /  SaO2: 99.6              Coags:            Urinalysis Basic - ( 17 Sep 2024 05:39 )    Color: x / Appearance: x / SG: x / pH: x  Gluc: 97 mg/dL / Ketone: x  / Bili: x / Urobili: x   Blood: x / Protein: x / Nitrite: x   Leuk Esterase: x / RBC: x / WBC x   Sq Epi: x / Non Sq Epi: x / Bacteria: x            RADIOLOGY & ADDITIONAL TESTS:      A/P:  SKYLAR ORTIZ is a 79F s/p ExLap, JUSTA, BSO, omentectomy, appendectomy, small bowel resection+reanastomosis, transverse colon resection+reanastomosis, tumor debulking with General surgery intra-op consult for segmental transverse colectomy with anastomosis on 9/12.        PLAN:   - recommend pt to be on CLD due to increasing abdominal distension  - ok for dulcolax suppository  - daily labs, replete electrolytes as needed   - multi modal pain control  - rest of care per OBGYN        #DVT ppx: apixaban 5 milliGRAM(s) Oral every 12 hours    Disposition:  4C    Above plan to be discussed with Attending Surgeon Dr. Morris  , patient, patient family, and rest of health care team    [employer] [id number]

## 2024-09-17 NOTE — DISCHARGE NOTE PROVIDER - NSDCMRMEDTOKEN_GEN_ALL_CORE_FT
apixaban 5 mg oral tablet: 1 tab(s) orally every 12 hours  atorvastatin 20 mg oral tablet: 1 tab(s) orally once a day (at bedtime)  hydrALAZINE 50 mg oral tablet: 1 tab(s) orally every 12 hours  metoprolol tartrate 25 mg oral tablet: 1 tab(s) orally 2 times a day  NIFEdipine 60 mg oral tablet, extended release: 1 tab(s) orally every 24 hours   apixaban 5 mg oral tablet: 1 tab(s) orally every 12 hours  atorvastatin 20 mg oral tablet: 1 tab(s) orally once a day (at bedtime)  Blood Pressure Cuff and Kit: Measure your blood pressure one-two times per day. Keep a record.  hydrALAZINE 50 mg oral tablet: 1 tab(s) orally every 12 hours  metoprolol tartrate 25 mg oral tablet: 1 tab(s) orally 2 times a day  NIFEdipine 60 mg oral tablet, extended release: 1 tab(s) orally every 24 hours

## 2024-09-18 ENCOUNTER — TRANSCRIPTION ENCOUNTER (OUTPATIENT)
Age: 79
End: 2024-09-18

## 2024-09-18 ENCOUNTER — NON-APPOINTMENT (OUTPATIENT)
Age: 79
End: 2024-09-18

## 2024-09-18 VITALS
OXYGEN SATURATION: 97 % | SYSTOLIC BLOOD PRESSURE: 123 MMHG | TEMPERATURE: 98 F | RESPIRATION RATE: 18 BRPM | DIASTOLIC BLOOD PRESSURE: 63 MMHG | HEART RATE: 71 BPM

## 2024-09-18 PROBLEM — Z00.00 ENCOUNTER FOR PREVENTIVE HEALTH EXAMINATION: Status: ACTIVE | Noted: 2024-09-18

## 2024-09-18 LAB
ANION GAP SERPL CALC-SCNC: 10 MMOL/L — SIGNIFICANT CHANGE UP (ref 7–14)
BASOPHILS # BLD AUTO: 0.02 K/UL — SIGNIFICANT CHANGE UP (ref 0–0.2)
BASOPHILS NFR BLD AUTO: 0.3 % — SIGNIFICANT CHANGE UP (ref 0–1)
BUN SERPL-MCNC: 11 MG/DL — SIGNIFICANT CHANGE UP (ref 10–20)
CALCIUM SERPL-MCNC: 7.6 MG/DL — LOW (ref 8.4–10.5)
CHLORIDE SERPL-SCNC: 102 MMOL/L — SIGNIFICANT CHANGE UP (ref 98–110)
CO2 SERPL-SCNC: 21 MMOL/L — SIGNIFICANT CHANGE UP (ref 17–32)
CREAT SERPL-MCNC: 0.8 MG/DL — SIGNIFICANT CHANGE UP (ref 0.7–1.5)
EGFR: 76 ML/MIN/1.73M2 — SIGNIFICANT CHANGE UP
EOSINOPHIL # BLD AUTO: 0 K/UL — SIGNIFICANT CHANGE UP (ref 0–0.7)
EOSINOPHIL NFR BLD AUTO: 0 % — SIGNIFICANT CHANGE UP (ref 0–8)
GLUCOSE BLDC GLUCOMTR-MCNC: 116 MG/DL — HIGH (ref 70–99)
GLUCOSE BLDC GLUCOMTR-MCNC: 124 MG/DL — HIGH (ref 70–99)
GLUCOSE SERPL-MCNC: 113 MG/DL — HIGH (ref 70–99)
HCT VFR BLD CALC: 27 % — LOW (ref 37–47)
HGB BLD-MCNC: 9.1 G/DL — LOW (ref 12–16)
IMM GRANULOCYTES NFR BLD AUTO: 2.2 % — HIGH (ref 0.1–0.3)
LYMPHOCYTES # BLD AUTO: 0.65 K/UL — LOW (ref 1.2–3.4)
LYMPHOCYTES # BLD AUTO: 8.4 % — LOW (ref 20.5–51.1)
MAGNESIUM SERPL-MCNC: 1.8 MG/DL — SIGNIFICANT CHANGE UP (ref 1.8–2.4)
MCHC RBC-ENTMCNC: 29.5 PG — SIGNIFICANT CHANGE UP (ref 27–31)
MCHC RBC-ENTMCNC: 33.7 G/DL — SIGNIFICANT CHANGE UP (ref 32–37)
MCV RBC AUTO: 87.7 FL — SIGNIFICANT CHANGE UP (ref 81–99)
MONOCYTES # BLD AUTO: 0.74 K/UL — HIGH (ref 0.1–0.6)
MONOCYTES NFR BLD AUTO: 9.5 % — HIGH (ref 1.7–9.3)
NEUTROPHILS # BLD AUTO: 6.17 K/UL — SIGNIFICANT CHANGE UP (ref 1.4–6.5)
NEUTROPHILS NFR BLD AUTO: 79.6 % — HIGH (ref 42.2–75.2)
NRBC # BLD: 0 /100 WBCS — SIGNIFICANT CHANGE UP (ref 0–0)
PHOSPHATE SERPL-MCNC: 2.2 MG/DL — SIGNIFICANT CHANGE UP (ref 2.1–4.9)
PLATELET # BLD AUTO: 254 K/UL — SIGNIFICANT CHANGE UP (ref 130–400)
PMV BLD: 9.2 FL — SIGNIFICANT CHANGE UP (ref 7.4–10.4)
POTASSIUM SERPL-MCNC: 3.6 MMOL/L — SIGNIFICANT CHANGE UP (ref 3.5–5)
POTASSIUM SERPL-SCNC: 3.6 MMOL/L — SIGNIFICANT CHANGE UP (ref 3.5–5)
RBC # BLD: 3.08 M/UL — LOW (ref 4.2–5.4)
RBC # FLD: 15.9 % — HIGH (ref 11.5–14.5)
SODIUM SERPL-SCNC: 133 MMOL/L — LOW (ref 135–146)
WBC # BLD: 7.75 K/UL — SIGNIFICANT CHANGE UP (ref 4.8–10.8)
WBC # FLD AUTO: 7.75 K/UL — SIGNIFICANT CHANGE UP (ref 4.8–10.8)

## 2024-09-18 RX ADMIN — ACETAMINOPHEN 975 MILLIGRAM(S): 325 TABLET ORAL at 06:04

## 2024-09-18 RX ADMIN — CHLORHEXIDINE GLUCONATE 1 APPLICATION(S): 40 SOLUTION TOPICAL at 06:06

## 2024-09-18 RX ADMIN — APIXABAN 5 MILLIGRAM(S): 5 TABLET, FILM COATED ORAL at 06:04

## 2024-09-18 RX ADMIN — METOPROLOL TARTRATE 25 MILLIGRAM(S): 100 TABLET ORAL at 09:02

## 2024-09-18 RX ADMIN — Medication 50 MILLIGRAM(S): at 09:03

## 2024-09-18 RX ADMIN — Medication 75 MILLILITER(S): at 06:04

## 2024-09-18 NOTE — PROGRESS NOTE ADULT - ASSESSMENT
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 S/p 24hrs of cefotetan  - 9/12 s/p pressors intraop and shortly postop. Extubated 9/13.   - PT consult: recc home PT  - final pathology pending results  - hemeonc following   - tolerating regular diet  - encouraged ambulation.   - s/p BM  - pain management with tylenol + oxy PRN  - SurgOnc also following, reccs appreciated  - Case management following to assist in setting up insurance  - AM labs ordered    # bilateral  LE DVTs  - VA Duplex LE bilateral: Right lower extremity DVT in peroneal vein and gastrocnemius veins. Left lower extremity DVT in gastrocnemius, posterior tibial, and peroneal veins.  - Previously on Heparin Drip. IVC filters placed by IR on 9/11 in preparation for the OR.   - NO SCDS  - c/w Eliquis 5mg BID. Per vascular consult, continue AC for 3 months.    #Prediabetes, A1C now 5.4%  - FS ACqHs. ISS.     #Ascending Aortic Aneurysm ~4cm. HTN.   - S/p normal TTE otherwise on 9/11  - s/p Lasix 10mg IVP on 9/15 with improvement in BPs  - due to continues elevated BPs, added Procardia 60XL daily on 9/16  - Current meds: hydralazine 50mg BID, metoprolol 20mg BID, procardia 60XL daily

## 2024-09-18 NOTE — PROGRESS NOTE ADULT - SUBJECTIVE AND OBJECTIVE BOX
GENERAL SURGERY PROGRESS NOTE    Patient: SKYLAR ORTIZ , 79y ([birth year])Female   MRN: [id number]  Location: [address]  Visit: 09-04-24 Inpatient  Date: 09-18-24 @ 02:59    Hospital Day #: 15  Post-Op Day #: 6    Procedure/Dx/Injuries:    Events of past 24 hours: s/p segmental transverse colectomy with anastomosis 24.7 x 12.5 x 22.3 cm, Pelvic mass    PAST MEDICAL & SURGICAL HISTORY:      Vitals:   T(F): 98.1 (09-18-24 @ 00:25), Max: 98.6 (09-17-24 @ 16:29)  HR: 67 (09-18-24 @ 00:25)  BP: 124/63 (09-18-24 @ 00:25)  RR: 18 (09-18-24 @ 00:25)  SpO2: 95% (09-18-24 @ 00:25)      Diet, Regular      Fluids:     I & O's:      Bowel Movement: : [] YES [X] NO  Flatus: : [X] YES [] NO    PHYSICAL EXAM:   General: NAD, AAOx3, calm and cooperative  Respiratory:  normal respiratory effort  Abdomen: Soft, distended, painful near incisional site  Musculoskeletal: moving all extremities   Vascular: extremities well perfused  Skin: Warm/dry, normal color, no jaundice  Incision/wound: healing well, clean, dry and intact    MEDICATIONS  (STANDING):  acetaminophen     Tablet .. 975 milliGRAM(s) Oral every 6 hours  apixaban 5 milliGRAM(s) Oral every 12 hours  atorvastatin 20 milliGRAM(s) Oral at bedtime  chlorhexidine 2% Cloths 1 Application(s) Topical <User Schedule>  dextrose 5% + sodium chloride 0.45%. 1000 milliLiter(s) (75 mL/Hr) IV Continuous <Continuous>  hydrALAZINE 50 milliGRAM(s) Oral every 12 hours  insulin lispro (ADMELOG) corrective regimen sliding scale   SubCutaneous Before meals and at bedtime  metoprolol tartrate 25 milliGRAM(s) Oral two times a day  NIFEdipine XL 60 milliGRAM(s) Oral every 24 hours    MEDICATIONS  (PRN):  oxyCODONE    IR 2.5 milliGRAM(s) Oral every 4 hours PRN Moderate Pain (4 - 6)  oxyCODONE    IR 5 milliGRAM(s) Oral every 4 hours PRN Severe Pain (7 - 10)      CAPILLARY BLOOD GLUCOSE      POCT Blood Glucose.: 151 mg/dL (17 Sep 2024 21:27)  POCT Blood Glucose.: 138 mg/dL (17 Sep 2024 16:08)  POCT Blood Glucose.: 156 mg/dL (17 Sep 2024 11:40)  POCT Blood Glucose.: 125 mg/dL (17 Sep 2024 08:02)                          8.8    6.38  )-----------( 228      ( 17 Sep 2024 05:39 )             26.9       Auto Neutrophil %: 75.3 % (09-17-24 @ 05:39)  Auto Immature Granulocyte %: 1.3 % (09-17-24 @ 05:39)    09-17    131[L]  |  102  |  13  ----------------------------<  97  3.9   |  21  |  0.9      Calcium: 7.4 mg/dL (09-17-24 @ 05:39)      LFTs:       ABG - ( 13 Sep 2024 11:38 )  pH: 7.40  /  pCO2: 32    /  pO2: 147   / HCO3: 20    / Base Excess: -4.4  /  SaO2: 100.0           ABG - ( 13 Sep 2024 09:56 )  pH: 7.37  /  pCO2: 32    /  pO2: 169   / HCO3: 18    / Base Excess: -6.1  /  SaO2: 99.8            ABG - ( 13 Sep 2024 05:03 )  pH: 7.37  /  pCO2: 29    /  pO2: 139   / HCO3: 17    / Base Excess: -7.6  /  SaO2: 99.6            Urinalysis Basic - ( 17 Sep 2024 05:39 )    Color: x / Appearance: x / SG: x / pH: x  Gluc: 97 mg/dL / Ketone: x  / Bili: x / Urobili: x   Blood: x / Protein: x / Nitrite: x   Leuk Esterase: x / RBC: x / WBC x   Sq Epi: x / Non Sq Epi: x / Bacteria: x        ASSESSMENT:  79y F w/ PMHx of 25cm abdomino-pelvic mass, s/p ExLap, JUSTA, BSO, omentectomy, appendectomy, small bowel resection+reanastomosis,    PLAN:  - Continue IVF at 75 mL /hr   - MMPC  - Continue home medications   - Recommend clear liquid diets  - F/u AM labs   - Monitor vitals   - Continue home anti-tensive medications       SURGERY SPECTRA: [id number]     GENERAL SURGERY PROGRESS NOTE    Patient: SKYLAR ORTIZ , 79y ([birth year])Female   MRN: [id number]  Location: [address]  Visit: 09-04-24 Inpatient  Date: 09-18-24 @ 02:59    Hospital Day #: 15  Post-Op Day #: 6    Procedure/Dx/Injuries:  s/p segmental transverse colectomy with anastomosis 24.7 x 12.5 x 22.3 cm, Pelvic mass.     Events of past 24 hours: patient continues to experience abdominal distention and tenderness around her incision site. Denies N/V/BM but is passing gas. Her abdomen is soft, distended, and mildly tender.     PAST MEDICAL & SURGICAL HISTORY:      Vitals:   T(F): 98.1 (09-18-24 @ 00:25), Max: 98.6 (09-17-24 @ 16:29)  HR: 67 (09-18-24 @ 00:25)  BP: 124/63 (09-18-24 @ 00:25)  RR: 18 (09-18-24 @ 00:25)  SpO2: 95% (09-18-24 @ 00:25)      Diet, Regular      Fluids:     I & O's:      Bowel Movement: : [] YES [X] NO  Flatus: : [X] YES [] NO    PHYSICAL EXAM:   General: NAD, AAOx3, calm and cooperative  Respiratory:  normal respiratory effort  Abdomen: Soft, distended, painful near incisional site  Musculoskeletal: moving all extremities   Vascular: extremities well perfused  Skin: Warm/dry, normal color, no jaundice  Incision/wound: healing well, clean, dry and intact    MEDICATIONS  (STANDING):  acetaminophen     Tablet .. 975 milliGRAM(s) Oral every 6 hours  apixaban 5 milliGRAM(s) Oral every 12 hours  atorvastatin 20 milliGRAM(s) Oral at bedtime  chlorhexidine 2% Cloths 1 Application(s) Topical <User Schedule>  dextrose 5% + sodium chloride 0.45%. 1000 milliLiter(s) (75 mL/Hr) IV Continuous <Continuous>  hydrALAZINE 50 milliGRAM(s) Oral every 12 hours  insulin lispro (ADMELOG) corrective regimen sliding scale   SubCutaneous Before meals and at bedtime  metoprolol tartrate 25 milliGRAM(s) Oral two times a day  NIFEdipine XL 60 milliGRAM(s) Oral every 24 hours    MEDICATIONS  (PRN):  oxyCODONE    IR 2.5 milliGRAM(s) Oral every 4 hours PRN Moderate Pain (4 - 6)  oxyCODONE    IR 5 milliGRAM(s) Oral every 4 hours PRN Severe Pain (7 - 10)      CAPILLARY BLOOD GLUCOSE      POCT Blood Glucose.: 151 mg/dL (17 Sep 2024 21:27)  POCT Blood Glucose.: 138 mg/dL (17 Sep 2024 16:08)  POCT Blood Glucose.: 156 mg/dL (17 Sep 2024 11:40)  POCT Blood Glucose.: 125 mg/dL (17 Sep 2024 08:02)                          8.8    6.38  )-----------( 228      ( 17 Sep 2024 05:39 )             26.9       Auto Neutrophil %: 75.3 % (09-17-24 @ 05:39)  Auto Immature Granulocyte %: 1.3 % (09-17-24 @ 05:39)    09-17    131[L]  |  102  |  13  ----------------------------<  97  3.9   |  21  |  0.9      Calcium: 7.4 mg/dL (09-17-24 @ 05:39)      LFTs:       ABG - ( 13 Sep 2024 11:38 )  pH: 7.40  /  pCO2: 32    /  pO2: 147   / HCO3: 20    / Base Excess: -4.4  /  SaO2: 100.0           ABG - ( 13 Sep 2024 09:56 )  pH: 7.37  /  pCO2: 32    /  pO2: 169   / HCO3: 18    / Base Excess: -6.1  /  SaO2: 99.8            ABG - ( 13 Sep 2024 05:03 )  pH: 7.37  /  pCO2: 29    /  pO2: 139   / HCO3: 17    / Base Excess: -7.6  /  SaO2: 99.6            Urinalysis Basic - ( 17 Sep 2024 05:39 )    Color: x / Appearance: x / SG: x / pH: x  Gluc: 97 mg/dL / Ketone: x  / Bili: x / Urobili: x   Blood: x / Protein: x / Nitrite: x   Leuk Esterase: x / RBC: x / WBC x   Sq Epi: x / Non Sq Epi: x / Bacteria: x        ASSESSMENT:  79y F w/ PMHx of 25cm abdomino-pelvic mass, s/p ExLap, JUSTA, BSO, omentectomy, appendectomy, small bowel resection+reanastomosis,    PLAN:  - Continue IVF at 75 mL /hr   - MMPC  - Continue home medications   - Recommend clear liquid diets  - F/u AM labs   - Monitor vitals   - Continue home anti-tensive medications       SURGERY SPECTRA: [id number]

## 2024-09-18 NOTE — DISCHARGE NOTE NURSING/CASE MANAGEMENT/SOCIAL WORK - PATIENT PORTAL LINK FT
· Currently prescribed coumadin 5mg daily  · INR 4 26  · Will hold home coumadin for now; possibly to resume at decreased dose pending result   · Continue to trend   · Given patient is going for IR procedure on 2/7 will continue to hold coumadin and place on DVT prophylaxis with heparin subQ for now     Lab Results   Component Value Date    INR 2 66 (H) 02/06/2022    INR 4 26 (H) 02/04/2022 You can access the FollowMyHealth Patient Portal offered by [facility] by registering at the following website: [email or web address]. By joining [employer] [facility]’s FollowMyHealth portal, you will also be able to view your health information using other applications (apps) compatible with our system.

## 2024-09-18 NOTE — PROGRESS NOTE ADULT - REASON FOR ADMISSION
groin pain

## 2024-09-18 NOTE — PROGRESS NOTE ADULT - SUBJECTIVE AND OBJECTIVE BOX
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 Resting comfortably. She denies pain. Continues to ambulate around the halls with her walker. Received dulcolax suppository yesterday s/p a few bowel movements, continues to pass gas.  Tolerating regular diet. Denies nausea/vomiting.  Denies HA, CP, SOB, fevers, chills, vaginal bleeding.     Physical Exam  Vital Signs Last 24 Hrs  T(C): 36.7 (18 Sep 2024 04:30), Max: 37 (17 Sep 2024 16:29)  T(F): 98 (18 Sep 2024 04:30), Max: 98.6 (17 Sep 2024 16:29)  HR: 71 (18 Sep 2024 05:40) (67 - 76)  BP: 123/58 (18 Sep 2024 05:40) (116/60 - 157/71)  BP(mean): --  RR: 18 (18 Sep 2024 04:30) (18 - 18)  SpO2: 93% (18 Sep 2024 04:30) (93% - 99%)    Parameters below as of 17 Sep 2024 16:29  Patient On (Oxygen Delivery Method): room air    Physical exam:  General - AAOx3, NAD  Abdomen   - Soft, nontender, mildly distended   - Clean, dry, intact vertical ex-lap incision with surgical staples   Pelvis/Vagina - No bleeding, deferred    Labs:                          8.8    6.38  )-----------( 228      ( 17 Sep 2024 05:39 )             26.9                             8.3    7.66  )-----------( 204      ( 16 Sep 2024 06:15 )             25.0                           9.7    9.71  )-----------( 217      ( 15 Sep 2024 11:50 )             28.9                  8.3    9.49  )-----------( 180      ( 09-14 @ 21:19 )             24.6                7.7    9.27  )-----------( 161      ( 09-14 @ 11:40 )             22.9                7.5    8.82  )-----------( 158      ( 09-14 @ 05:32 )             22.5                11.4   10.91 )-----------( 222      ( 09-12 @ 21:35 )             34.0                11.6   3.99  )-----------( 207      ( 09-12 @ 14:12 )             35.1       09-17    131[L]  |  102  |  13  ----------------------------<  97  3.9   |  21  |  0.9    Ca    7.4[L]      17 Sep 2024 05:39  Phos  2.6     09-17  Mg     1.7     09-17 09-16    131<L>  |  102  |  12  ----------------------------<  103<H>  4.1   |  21  |  0.8    Ca    7.3<L>      16 Sep 2024 06:15  Phos  2.9     09-16  Mg     1.9     09-16        09-15    132<L>  |  100  |  10  ----------------------------<  126<H>  4.1   |  22  |  0.9    Ca    7.5<L>      15 Sep 2024 11:50  Phos  3.5     09-15  Mg     2.0     09-15      14 Sep 2024 21:19    136    |  105    |  14     ----------------------------<  102<H>  4.1     |  22     |  1.1    14 Sep 2024 05:32    136    |  105    |  17     ----------------------------<  91     4.2     |  22     |  1.3      Ca    7.6<L>      14 Sep 2024 21:19  Ca    7.7<L>      14 Sep 2024 05:32  Phos  2.5       14 Sep 2024 21:19  Phos  3.8       14 Sep 2024 05:32  Mg     1.9       14 Sep 2024 21:19  Mg     2.2       14 Sep 2024 05:32    TPro  3.8<L>  /  Alb  2.4<L>  /  TBili  0.7    /  DBili  x      /  AST  31     /  ALT  15     /  AlkPhos  47     12 Sep 2024 14:12    MEDICATIONS  (STANDING):  acetaminophen     Tablet .. 975 milliGRAM(s) Oral every 6 hours  apixaban 5 milliGRAM(s) Oral every 12 hours  atorvastatin 20 milliGRAM(s) Oral at bedtime  chlorhexidine 2% Cloths 1 Application(s) Topical <User Schedule>  dextrose 5% + sodium chloride 0.45%. 1000 milliLiter(s) (75 mL/Hr) IV Continuous <Continuous>  hydrALAZINE 50 milliGRAM(s) Oral every 12 hours  insulin lispro (ADMELOG) corrective regimen sliding scale   SubCutaneous Before meals and at bedtime  metoprolol tartrate 25 milliGRAM(s) Oral two times a day  NIFEdipine XL 60 milliGRAM(s) Oral every 24 hours    MEDICATIONS  (PRN):  oxyCODONE    IR 2.5 milliGRAM(s) Oral every 4 hours PRN Moderate Pain (4 - 6)  oxyCODONE    IR 5 milliGRAM(s) Oral every 4 hours PRN Severe Pain (7 - 10)    ---  9/5: 3.9>9.1/27.2<209, ptt 51.5, 130/4.2/96/26/15/1/103, mag 1.9, A pos/neg  estradiol 15. A1C 5.4%  9/6 CA-125 34, CA 19-9 30, CEA 4.8, inhibin A 1.5 (n), inhibin B 19.4  9/8 A pos  9/10  TTE- LVEF 57%, dilatation of the ascending aorta, suboptimally visualized (4.0cm, 2.4cm/m2).  9/11 3.44>8.6/26<233, pt/INR/ptt 11.6/1.02/56.9, 134/4.4/99/24/14/1.1/92, mag 1.9, A pos/neg  @1700     9/12 (PACU) 3.99>11.6/35.1<207, 134/3.9/105/17/12/0.9<135, AST/ALT 31/15, Mg 1.5, Ph 4.3, ABG: pH 7.3, BE -9.2  9/12 @2135 10.91>11.4/34<222, 132/4.5/102/14/15/1.1<210, Mg 2.6, Phos 4.9  9/14 @0430 8.81>7.5/22.5<158, 136/4.2/105/22/17/1.3<91, Mg 2.2, Phos 3.8, A pos  @1100 9.27>7.7/22.9<161  @2000 9.49>8.3/24.6<180, 136/4.1/105/22/14/1.1<102, Mg 1.9, Phos 2.5  --  9/4 CT abdomen: Large multiloculated cystic mass with areas of soft tissue, measuring 24.7 x 12.5 x 22.3 cm, extending from the pelvis into the upper abdomen. Findings are concerning for malignancy; felt to likely be gynecologic in etiology, but this mass remains indeterminate. Questionable filling defects in both the right and left common femoral vein and deep venous thrombosis is not excluded. Further evaluation with ultrasound is recommended. Mild bilateral hydronephrosis. Indeterminate multiple fluid-filled/cystic lesions within the abdomen measuring up to 6.5 cm, felt to likely be related to cystic metastatic disease. Soft tissue density within the transverse colon adjacent to the splenic flexure on image 52 series 2. Findings may be related to focal stool. Underlying neoplasm cannot be excluded. Subcutaneous nodule measuring 1.8 cm and subcentimeter sclerotic focus in T12, metastatic disease is not excluded. Small right pleural effusion. Ascending aorta measures 4.5 cm, aneurysmal. 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  9/6 TVUS: Large heterogeneous mass with measuring 22.6 x 16.9 x 20.3 cm within the pelvic cavity. The mass obscures visualization of the uterus and ovaries.  9/12 PACU CXR: No acute pulmonary process.  9/12 PACU EKG: Diagnosis Line Sinus bradycardia. Anterior infarct , age undetermined. T wave abnormality, consider inferior ischemia. Prolonged QT    < from: Xray Kidney Ureter Bladder (09.15.24 @ 05:43) >  FINDINGS/  IMPRESSION:    Diffusely gas-distended loops of bowel, nonspecific pattern, likely   postoperative ileus. IVC filter noted. Midline laparotomy skin staples.   Degenerative changes in the spine and hips.    < end of copied text >

## 2024-09-20 ENCOUNTER — NON-APPOINTMENT (OUTPATIENT)
Age: 79
End: 2024-09-20

## 2024-09-25 ENCOUNTER — APPOINTMENT (OUTPATIENT)
Dept: GYNECOLOGIC ONCOLOGY | Facility: CLINIC | Age: 79
End: 2024-09-25
Payer: SELF-PAY

## 2024-09-25 DIAGNOSIS — C80.0 DISSEMINATED MALIGNANT NEOPLASM, UNSPECIFIED: ICD-10-CM

## 2024-09-25 PROCEDURE — 99024 POSTOP FOLLOW-UP VISIT: CPT

## 2024-09-25 NOTE — HISTORY OF PRESENT ILLNESS
[FreeTextEntry1] : post op, admitted to [facility] with large pelvic mass.  Had Exlap/JUSTA/BSO/omentectomy/SBR/LBS w SSA, tumor debulking on 9/12/24  No complaints

## 2024-09-25 NOTE — PHYSICAL EXAM
[Absent] : CVAT: absent [Normal] : Vagina: Normal [FreeTextEntry1] : NAD but run down [de-identified] : soft NT/ND Incision DCI [de-identified] : no bleeding, cuff intact [Ambulatory and capable of all self care but unable to carry out any work activities] : Status 2- Ambulatory and capable of all self care but unable to carry out any work activities. Up and about more than 50% of waking hours

## 2024-09-30 LAB — SURGICAL PATHOLOGY STUDY: SIGNIFICANT CHANGE UP

## 2024-10-01 DIAGNOSIS — J90 PLEURAL EFFUSION, NOT ELSEWHERE CLASSIFIED: ICD-10-CM

## 2024-10-01 DIAGNOSIS — R73.03 PREDIABETES: ICD-10-CM

## 2024-10-01 DIAGNOSIS — E83.42 HYPOMAGNESEMIA: ICD-10-CM

## 2024-10-01 DIAGNOSIS — K59.00 CONSTIPATION, UNSPECIFIED: ICD-10-CM

## 2024-10-01 DIAGNOSIS — E87.20 ACIDOSIS, UNSPECIFIED: ICD-10-CM

## 2024-10-01 DIAGNOSIS — I82.463 ACUTE EMBOLISM AND THROMBOSIS OF CALF MUSCULAR VEIN, BILATERAL: ICD-10-CM

## 2024-10-01 DIAGNOSIS — I82.433 ACUTE EMBOLISM AND THROMBOSIS OF POPLITEAL VEIN, BILATERAL: ICD-10-CM

## 2024-10-01 DIAGNOSIS — I72.8 ANEURYSM OF OTHER SPECIFIED ARTERIES: ICD-10-CM

## 2024-10-01 DIAGNOSIS — C56.1 MALIGNANT NEOPLASM OF RIGHT OVARY: ICD-10-CM

## 2024-10-01 DIAGNOSIS — K56.7 ILEUS, UNSPECIFIED: ICD-10-CM

## 2024-10-01 DIAGNOSIS — C78.5 SECONDARY MALIGNANT NEOPLASM OF LARGE INTESTINE AND RECTUM: ICD-10-CM

## 2024-10-01 DIAGNOSIS — Z78.1 PHYSICAL RESTRAINT STATUS: ICD-10-CM

## 2024-10-01 DIAGNOSIS — E87.1 HYPO-OSMOLALITY AND HYPONATREMIA: ICD-10-CM

## 2024-10-01 DIAGNOSIS — I95.89 OTHER HYPOTENSION: ICD-10-CM

## 2024-10-01 DIAGNOSIS — D62 ACUTE POSTHEMORRHAGIC ANEMIA: ICD-10-CM

## 2024-10-01 DIAGNOSIS — N13.30 UNSPECIFIED HYDRONEPHROSIS: ICD-10-CM

## 2024-10-01 DIAGNOSIS — I82.453 ACUTE EMBOLISM AND THROMBOSIS OF PERONEAL VEIN, BILATERAL: ICD-10-CM

## 2024-10-01 DIAGNOSIS — I71.21 ANEURYSM OF THE ASCENDING AORTA, WITHOUT RUPTURE: ICD-10-CM

## 2024-10-01 DIAGNOSIS — C78.6 SECONDARY MALIGNANT NEOPLASM OF RETROPERITONEUM AND PERITONEUM: ICD-10-CM

## 2024-10-02 ENCOUNTER — APPOINTMENT (OUTPATIENT)
Dept: GYNECOLOGIC ONCOLOGY | Facility: CLINIC | Age: 79
End: 2024-10-02
Payer: MEDICAID

## 2024-10-02 VITALS
HEIGHT: 61 IN | SYSTOLIC BLOOD PRESSURE: 145 MMHG | DIASTOLIC BLOOD PRESSURE: 76 MMHG | WEIGHT: 100 LBS | HEART RATE: 71 BPM | BODY MASS INDEX: 18.88 KG/M2

## 2024-10-02 DIAGNOSIS — C18.9 MALIGNANT NEOPLASM OF COLON, UNSPECIFIED: ICD-10-CM

## 2024-10-02 PROCEDURE — 99213 OFFICE O/P EST LOW 20 MIN: CPT | Mod: 24

## 2024-10-02 NOTE — HISTORY OF PRESENT ILLNESS
[FreeTextEntry1] : 80 y/o, admitted to [facility] with large pelvic mass.and carcinomatosis.  Had Exlap/JUSTA/BSO/omentectomy/SBR/LBS w SSA, tumor debulking on 9/12/24 Was seen last week but the path was not final, discussed today in TB  No complaints

## 2024-10-02 NOTE — DISCUSSION/SUMMARY
[Reviewed Clinical Lab Test(s)] : Results of clinical tests were reviewed. [Reviewed Radiology Report(s)] : Radiology reports were reviewed. [Reviewed Radiology Film/Image(s)] : Images from radiology studies were reviewed and examined. [Visit Time ___ Minutes] : [unfilled] minutes [Face to Face Time___ Minutes] : with [unfilled] minutes in face to face consultation. [FreeTextEntry1] : Had a long conversation with patient and her cousin. Final pathology is c/w metastatic colon cancer, with optimal cytoreduction to 2-3 mm range. Discussed today in TB and the recommendation was to receive FOFLOX regimen and possible HIPEC in the future.  Patient has some reservations regarding chemo and will talk to her family about it. Prognosis is difficult to gauge but there are promising treatment options available. Multiple questions answered, concerns addressed.  Return in 4-6 weeks for cuff check

## 2024-10-02 NOTE — PHYSICAL EXAM
[Normal] : Masses/Tenderness: Non-tender, no masses [Absent] : CVAT: absent [FreeTextEntry1] : NAD  [de-identified] : soft NT/ND Incision DCI [de-identified] : no pelvic today [Restricted in physically strenuous activity but ambulatory and able to carry out work of a light or sedentary nature] : Status 1- Restricted in physically strenuous activity but ambulatory and able to carry out work of a light or sedentary nature, e.g., light house work, office work

## 2024-10-28 ENCOUNTER — LABORATORY RESULT (OUTPATIENT)
Age: 79
End: 2024-10-28

## 2024-10-28 ENCOUNTER — OUTPATIENT (OUTPATIENT)
Dept: OUTPATIENT SERVICES | Facility: HOSPITAL | Age: 79
LOS: 1 days | End: 2024-10-28
Payer: MEDICAID

## 2024-10-28 ENCOUNTER — APPOINTMENT (OUTPATIENT)
Age: 79
End: 2024-10-28
Payer: MEDICAID

## 2024-10-28 VITALS
BODY MASS INDEX: 22.47 KG/M2 | SYSTOLIC BLOOD PRESSURE: 195 MMHG | RESPIRATION RATE: 16 BRPM | DIASTOLIC BLOOD PRESSURE: 87 MMHG | OXYGEN SATURATION: 99 % | HEIGHT: 61 IN | WEIGHT: 119 LBS | TEMPERATURE: 97.3 F | HEART RATE: 74 BPM

## 2024-10-28 DIAGNOSIS — C80.0 DISSEMINATED MALIGNANT NEOPLASM, UNSPECIFIED: ICD-10-CM

## 2024-10-28 DIAGNOSIS — C79.60 MALIGNANT NEOPLASM OF COLON, UNSPECIFIED: ICD-10-CM

## 2024-10-28 DIAGNOSIS — C18.9 MALIGNANT NEOPLASM OF COLON, UNSPECIFIED: ICD-10-CM

## 2024-10-28 LAB
ALBUMIN SERPL ELPH-MCNC: 4 G/DL
ALP BLD-CCNC: 59 U/L
ALT SERPL-CCNC: 9 U/L
ANION GAP SERPL CALC-SCNC: 11 MMOL/L
APTT BLD: 36.5 SEC
AST SERPL-CCNC: 16 U/L
BILIRUB SERPL-MCNC: 0.3 MG/DL
BUN SERPL-MCNC: 25 MG/DL
CALCIUM SERPL-MCNC: 8.9 MG/DL
CHLORIDE SERPL-SCNC: 102 MMOL/L
CO2 SERPL-SCNC: 22 MMOL/L
CREAT SERPL-MCNC: 1.2 MG/DL
EGFR: 47 ML/MIN/1.73M2
GLUCOSE SERPL-MCNC: 96 MG/DL
HCT VFR BLD CALC: 26.8 %
HGB BLD-MCNC: 8.2 G/DL
INR PPP: 1.39 RATIO
MCHC RBC-ENTMCNC: 26.5 PG
MCHC RBC-ENTMCNC: 30.6 G/DL
MCV RBC AUTO: 86.5 FL
PLATELET # BLD AUTO: 175 K/UL
PMV BLD: 10.6 FL
POTASSIUM SERPL-SCNC: 4.4 MMOL/L
PROT SERPL-MCNC: 6.4 G/DL
PT BLD: 15.9 SEC
RBC # BLD: 3.1 M/UL
RBC # FLD: 15.6 %
SODIUM SERPL-SCNC: 135 MMOL/L
WBC # FLD AUTO: 3.22 K/UL

## 2024-10-28 PROCEDURE — 99244 OFF/OP CNSLTJ NEW/EST MOD 40: CPT

## 2024-10-28 PROCEDURE — 99204 OFFICE O/P NEW MOD 45 MIN: CPT

## 2024-10-28 PROCEDURE — 80053 COMPREHEN METABOLIC PANEL: CPT

## 2024-10-28 PROCEDURE — 82607 VITAMIN B-12: CPT

## 2024-10-28 PROCEDURE — 85730 THROMBOPLASTIN TIME PARTIAL: CPT

## 2024-10-28 PROCEDURE — 82728 ASSAY OF FERRITIN: CPT

## 2024-10-28 PROCEDURE — 82746 ASSAY OF FOLIC ACID SERUM: CPT

## 2024-10-28 PROCEDURE — 85610 PROTHROMBIN TIME: CPT

## 2024-10-28 PROCEDURE — 85027 COMPLETE CBC AUTOMATED: CPT

## 2024-10-28 PROCEDURE — 82378 CARCINOEMBRYONIC ANTIGEN: CPT

## 2024-10-29 DIAGNOSIS — C80.0 DISSEMINATED MALIGNANT NEOPLASM, UNSPECIFIED: ICD-10-CM

## 2024-10-29 LAB
CEA SERPL-MCNC: 1.7 NG/ML
FERRITIN SERPL-MCNC: 42 NG/ML
FOLATE SERPL-MCNC: 6.4 NG/ML
VIT B12 SERPL-MCNC: 379 PG/ML

## 2024-11-04 ENCOUNTER — OUTPATIENT (OUTPATIENT)
Dept: OUTPATIENT SERVICES | Facility: HOSPITAL | Age: 79
LOS: 1 days | Discharge: HOME | End: 2024-11-04
Payer: SELF-PAY

## 2024-11-04 ENCOUNTER — TRANSCRIPTION ENCOUNTER (OUTPATIENT)
Age: 79
End: 2024-11-04

## 2024-11-04 VITALS
TEMPERATURE: 97 F | HEART RATE: 70 BPM | RESPIRATION RATE: 19 BRPM | SYSTOLIC BLOOD PRESSURE: 169 MMHG | OXYGEN SATURATION: 96 % | DIASTOLIC BLOOD PRESSURE: 76 MMHG

## 2024-11-04 VITALS
SYSTOLIC BLOOD PRESSURE: 200 MMHG | WEIGHT: 100.09 LBS | HEART RATE: 83 BPM | TEMPERATURE: 97 F | DIASTOLIC BLOOD PRESSURE: 87 MMHG | RESPIRATION RATE: 17 BRPM

## 2024-11-04 DIAGNOSIS — C18.9 MALIGNANT NEOPLASM OF COLON, UNSPECIFIED: ICD-10-CM

## 2024-11-04 PROCEDURE — C1788: CPT

## 2024-11-04 PROCEDURE — 76937 US GUIDE VASCULAR ACCESS: CPT

## 2024-11-04 PROCEDURE — 77001 FLUOROGUIDE FOR VEIN DEVICE: CPT

## 2024-11-04 PROCEDURE — 36561 INSERT TUNNELED CV CATH: CPT

## 2024-11-04 PROCEDURE — C1769: CPT

## 2024-11-04 PROCEDURE — 76937 US GUIDE VASCULAR ACCESS: CPT | Mod: 26

## 2024-11-04 PROCEDURE — 77001 FLUOROGUIDE FOR VEIN DEVICE: CPT | Mod: 26

## 2024-11-04 RX ORDER — NIFEDIPINE 90 MG
60 TABLET, EXTENDED RELEASE 24 HR ORAL ONCE
Refills: 0 | Status: COMPLETED | OUTPATIENT
Start: 2024-11-04 | End: 2024-11-04

## 2024-11-04 RX ORDER — CEFAZOLIN SODIUM 1 G
2000 VIAL (EA) INJECTION ONCE
Refills: 0 | Status: COMPLETED | OUTPATIENT
Start: 2024-11-04 | End: 2024-11-04

## 2024-11-04 RX ADMIN — Medication 100 MILLIGRAM(S): at 10:37

## 2024-11-04 RX ADMIN — Medication 60 MILLIGRAM(S): at 10:03

## 2024-11-04 NOTE — PROCEDURE NOTE - PROCEDURE FINDINGS AND DETAILS
Successful placement of a right sided chemoport. Catheter is heparinized and ready for immediate use.

## 2024-11-04 NOTE — ASU PATIENT PROFILE, ADULT - REASON FOR ADMISSION, PROFILE
OFFICE VISIT    HISTORY    Jozef Izquierdo is a 56 year old male  who presents today for :    CPE      Vit D 4000 IU daily  Elevated PSA- dribbling urine. Nocturia 1x nightly  Takes saw palmetto sporadically.  He Is seeing urology.on testosterone. He isnt taking flomax.    Due for colonoscopy and interested in this.  Has regular BMs, daily, no issues with straining.    Hasn't scheduled colonoscopy or PSG.      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    Health Maintenance Due   Topic Date Due    DTaP/Tdap/Td Vaccine (1 - Tdap) 01/02/2004    Colorectal Cancer Screen  Never done    Shingles Vaccine (1 of 2) Never done    COVID-19 Vaccine (1 - 2023-24 season) Never done     Current Outpatient Medications   Medication Sig Dispense Refill    testosterone cypionate (DEPO-TESTOSTERONE) 200 MG/ML injectable solution Inject 0.35 ml 2x week subcutaneously as directed 10 mL 5    Needle, Disp, (BD Hypodermic Needle) 18G X 1\" Misc Use for drawing up medication 30 each 3    Needle, Disp, (BD Disp Needles) 27G X 1/2\" Misc TO  USE TO INJECT MEDICATION 30 each 3    Syringe, Disposable, (B-D SYRINGE LUER-GENO) 1 ML Misc TO USE WITH TESTOSTERONE INJECTION 30 each 3    ketotifen (ZADITOR) 0.035 % ophthalmic solution One drop in affected eye(s) 3-4 X daily as needed for eye irritation 5 mL 1    sildenafil (Viagra) 100 MG tablet Take 1 tablet by mouth daily as needed for Erectile Dysfunction. 30 tablet 3    fluticasone (FLONASE) 50 MCG/ACT nasal spray SPRAY 2 SPRAYS IN EACH NOSTRIL IN THE MORNING AND 2 SPRAYS IN THE EVENING. 16 mL 3    Sharps Container (BD GrouperAN SHARPS ) Misc TO DISPOSE OF NEEDLES AND SYRINGES 1 each 11    clobetasol (TEMOVATE) 0.05 % cream Apply 15 g topically.      triamcinolone (ARISTOCORT) 0.1 % ointment Apply twice daily on the right calf on wet skin. Stop when  clear and apply Vaseline or Aquaphor daily. 30 g 0    halobetasol (ULTRAVATE) 0.05 % ointment Apply topically 2 times daily. Do not use for more than 2 weeks consecutively. Always use vaseline to skin when it clears 50 g 0    Multiple Vitamins-Minerals (ZINC PO) Take by mouth daily.       No current facility-administered medications for this visit.     ALLERGIES:   Allergen Reactions    Pollen Extract Other (See Comments)     Other reaction(s): Other (see comments)       Past Medical History:   Diagnosis Date    High cholesterol      No past surgical history on file.  Family History   Problem Relation Age of Onset    Multiple myeloma Father 75    Rheumatoid Arthritis Father     Heart disease Maternal Grandmother     Cancer Paternal Grandfather         bladder    Cancer Other      Social History     Socioeconomic History    Marital status:      Spouse name: Not on file    Number of children: Not on file    Years of education: Not on file    Highest education level: Not on file   Occupational History    Not on file   Tobacco Use    Smoking status: Never     Passive exposure: Past    Smokeless tobacco: Never   Substance and Sexual Activity    Alcohol use: Never    Drug use: Never    Sexual activity: Not on file   Other Topics Concern    Not on file   Social History Narrative    Not on file     Social Determinants of Health     Financial Resource Strain: Not on file   Food Insecurity: Not on file   Transportation Needs: Not on file   Physical Activity: Not on file   Stress: Not on file   Social Connections: Not on file   Interpersonal Safety: Not on file (4/10/2022)         PHYSICAL EXAM      Vital Signs:    Visit Vitals  /84   Pulse 76   Resp 18   Ht 6' (1.829 m)   Wt 92.1 kg (203 lb)   SpO2 97%   BMI 27.53 kg/m²     Pulse Ox Interpretation:  Pulse ox not performed  General:  Alert, cooperative, conversive.  Skin:  Warm and dry without rash.    Head:  Normocephalic-atraumatic.   Neck:  Trachea is midline.  No adenopathy.  Normal thyroid without mass or tenderness..   Eyes:  Normal conjunctivae and sclerae.  Pupils equal, round, reactive to light.  Extraocular movements intact.  ENT:  Mucous membranes are moist.  Normal tympanic membranes and external auditory canals bilaterally.  No pharyngeal erythema or exudate.  No facial tenderness.  Normal nasal mucosa.  Cardiovascular:  Symmetrical pulses.  Regular, rate and rhythm without murmur.  Respiratory:  Normal respiratory effort.  Clear to auscultation.  No wheezes, rales or rhonchi.  Gastrointestinal:  Soft and nontender.  Normal bowel sounds.  No hepatomegaly or splenomegaly.   Musculoskeletal:  No deformity or edema.  No tenderness to palpation.  Back:   Normal alignment.  No costovertebral angle tenderness or midline bony tenderness.  Neurologic:   Oriented times 4.  Cranial nerves 2-12 are intact.  No focal deficits or lateralizing signs.  Psychiatric:   Cooperative.  Appropriate mood and affect.      ASSESSMENT & PLAN      Orders Placed This Encounter    CT HEART CALCIUM SCORING    ZOSTER (SHINGRIX)    TDAP (BOOSTRIX)    Lipid Panel With Reflex     Return in about 1 year (around 5/30/2025).    Need for vaccination  - ZOSTER (SHINGRIX)  - TDAP (BOOSTRIX)    Hyperlipidemia, unspecified hyperlipidemia type  Diet and exercise counseling provided.  Recommend coronary calcium score  - Lipid Panel With Reflex; Future  - CT HEART CALCIUM SCORING; Future    Vitamin D deficiency  Doing better, supplementing this, notes less viral URI sx since supplementing           Follow-up instructions provided.  Red flags discussed.    Instructed patient on correct medication dosing, usage and adverse effects (if applicable).  All questions and concerns discussed.   Patient agreeable to plan.      Tere Rocha MD  Family Medicine  [address] [address] [place] [address], [address]  [place] [address]  Telephone: [phone]    Fax: [phone]   port

## 2024-11-04 NOTE — ASU DISCHARGE PLAN (ADULT/PEDIATRIC) - FINANCIAL ASSISTANCE
[facility] provides services at a reduced cost to those who are determined to be eligible through [facility]’s financial assistance program. Information regarding [facility]’s financial assistance program can be found by going to [email or web address] or by calling [phone].

## 2024-11-04 NOTE — PRE PROCEDURE NOTE - PRE PROCEDURE EVALUATION
79 years old lady from [facility] with PMHx of HTN and newly diagnosed DVTs and known metastatic colorectal cancer presents today for port catheter placement.    Plan for image guided port catheter placement with conscious sedation / anesthesia today 11/4

## 2024-11-04 NOTE — ASU PREOP CHECKLIST - AS BP NONINV SITE
Father  Still living? Unknown  Family history of hypertension, Age at diagnosis: Age Unknown     Mother  Still living? No  Family history of hypertension, Age at diagnosis: Age Unknown     Sibling  Still living? Yes, Estimated age: Age Unknown  Family history of diabetes mellitus, Age at diagnosis: Age Unknown
right upper arm

## 2024-11-05 PROBLEM — I10 ESSENTIAL (PRIMARY) HYPERTENSION: Chronic | Status: ACTIVE | Noted: 2024-11-04

## 2024-11-06 ENCOUNTER — RESULT REVIEW (OUTPATIENT)
Age: 79
End: 2024-11-06

## 2024-11-06 ENCOUNTER — OUTPATIENT (OUTPATIENT)
Dept: OUTPATIENT SERVICES | Facility: HOSPITAL | Age: 79
LOS: 1 days | End: 2024-11-06
Payer: MEDICAID

## 2024-11-06 DIAGNOSIS — Z00.8 ENCOUNTER FOR OTHER GENERAL EXAMINATION: ICD-10-CM

## 2024-11-06 DIAGNOSIS — C18.9 MALIGNANT NEOPLASM OF COLON, UNSPECIFIED: ICD-10-CM

## 2024-11-06 PROCEDURE — 71260 CT THORAX DX C+: CPT

## 2024-11-06 PROCEDURE — 74177 CT ABD & PELVIS W/CONTRAST: CPT | Mod: 26

## 2024-11-06 PROCEDURE — 74177 CT ABD & PELVIS W/CONTRAST: CPT

## 2024-11-06 PROCEDURE — 71260 CT THORAX DX C+: CPT | Mod: 26

## 2024-11-06 RX ORDER — PROCHLORPERAZINE MALEATE 5 MG/1
5 TABLET ORAL
Qty: 30 | Refills: 1 | Status: ACTIVE | COMMUNITY
Start: 2024-11-06 | End: 1900-01-01

## 2024-11-06 RX ORDER — ONDANSETRON 8 MG/1
8 TABLET, ORALLY DISINTEGRATING ORAL
Qty: 30 | Refills: 3 | Status: ACTIVE | COMMUNITY
Start: 2024-11-06 | End: 1900-01-01

## 2024-11-07 DIAGNOSIS — C18.9 MALIGNANT NEOPLASM OF COLON, UNSPECIFIED: ICD-10-CM

## 2024-11-13 ENCOUNTER — APPOINTMENT (OUTPATIENT)
Dept: GYNECOLOGIC ONCOLOGY | Facility: CLINIC | Age: 79
End: 2024-11-13
Payer: MEDICAID

## 2024-11-13 VITALS
BODY MASS INDEX: 22.47 KG/M2 | WEIGHT: 119 LBS | SYSTOLIC BLOOD PRESSURE: 160 MMHG | DIASTOLIC BLOOD PRESSURE: 90 MMHG | HEIGHT: 61 IN

## 2024-11-13 DIAGNOSIS — C79.60 MALIGNANT NEOPLASM OF COLON, UNSPECIFIED: ICD-10-CM

## 2024-11-13 DIAGNOSIS — Z85.9 PERSONAL HISTORY OF MALIGNANT NEOPLASM, UNSPECIFIED: ICD-10-CM

## 2024-11-13 DIAGNOSIS — C18.9 MALIGNANT NEOPLASM OF COLON, UNSPECIFIED: ICD-10-CM

## 2024-11-13 PROCEDURE — 99024 POSTOP FOLLOW-UP VISIT: CPT

## 2024-11-19 ENCOUNTER — OUTPATIENT (OUTPATIENT)
Dept: OUTPATIENT SERVICES | Facility: HOSPITAL | Age: 79
LOS: 1 days | End: 2024-11-19
Payer: MEDICAID

## 2024-11-19 ENCOUNTER — APPOINTMENT (OUTPATIENT)
Age: 79
End: 2024-11-19

## 2024-11-19 ENCOUNTER — LABORATORY RESULT (OUTPATIENT)
Age: 79
End: 2024-11-19

## 2024-11-19 VITALS — DIASTOLIC BLOOD PRESSURE: 62 MMHG | SYSTOLIC BLOOD PRESSURE: 135 MMHG

## 2024-11-19 VITALS — WEIGHT: 123.25 LBS | HEIGHT: 62 IN | BODY MASS INDEX: 22.68 KG/M2

## 2024-11-19 VITALS — TEMPERATURE: 97 F | DIASTOLIC BLOOD PRESSURE: 80 MMHG | SYSTOLIC BLOOD PRESSURE: 174 MMHG | HEART RATE: 73 BPM

## 2024-11-19 DIAGNOSIS — C18.9 MALIGNANT NEOPLASM OF COLON, UNSPECIFIED: ICD-10-CM

## 2024-11-19 LAB
ALBUMIN SERPL ELPH-MCNC: 3.9 G/DL
ALP BLD-CCNC: 59 U/L
ALT SERPL-CCNC: 6 U/L
ANION GAP SERPL CALC-SCNC: 11 MMOL/L
AST SERPL-CCNC: 12 U/L
BILIRUB DIRECT SERPL-MCNC: <0.2 MG/DL
BILIRUB INDIRECT SERPL-MCNC: NORMAL MG/DL
BILIRUB SERPL-MCNC: <0.2 MG/DL
BLD GP AB SCN SERPL QL: SIGNIFICANT CHANGE UP
BUN SERPL-MCNC: 27 MG/DL
CALCIUM SERPL-MCNC: 8.5 MG/DL
CHLORIDE SERPL-SCNC: 105 MMOL/L
CO2 SERPL-SCNC: 21 MMOL/L
CREAT SERPL-MCNC: 0.9 MG/DL
EGFR: 66 ML/MIN/1.73M2
GLUCOSE SERPL-MCNC: 93 MG/DL
HCT VFR BLD CALC: 21.7 %
HGB BLD-MCNC: 7 G/DL
MCHC RBC-ENTMCNC: 24.4 PG
MCHC RBC-ENTMCNC: 32.3 G/DL
MCV RBC AUTO: 75.6 FL
PLATELET # BLD AUTO: 183 K/UL
PMV BLD: 10.8 FL
POTASSIUM SERPL-SCNC: 4.3 MMOL/L
PROT SERPL-MCNC: 6.1 G/DL
RBC # BLD: 2.87 M/UL
RBC # FLD: 16.1 %
SODIUM SERPL-SCNC: 137 MMOL/L
WBC # FLD AUTO: 3.3 K/UL

## 2024-11-19 PROCEDURE — 86923 COMPATIBILITY TEST ELECTRIC: CPT

## 2024-11-19 PROCEDURE — 36430 TRANSFUSION BLD/BLD COMPNT: CPT

## 2024-11-19 PROCEDURE — 96375 TX/PRO/DX INJ NEW DRUG ADDON: CPT

## 2024-11-19 PROCEDURE — 80076 HEPATIC FUNCTION PANEL: CPT

## 2024-11-19 PROCEDURE — 86900 BLOOD TYPING SEROLOGIC ABO: CPT

## 2024-11-19 PROCEDURE — 86850 RBC ANTIBODY SCREEN: CPT

## 2024-11-19 PROCEDURE — 96415 CHEMO IV INFUSION ADDL HR: CPT

## 2024-11-19 PROCEDURE — 36415 COLL VENOUS BLD VENIPUNCTURE: CPT

## 2024-11-19 PROCEDURE — 96413 CHEMO IV INFUSION 1 HR: CPT

## 2024-11-19 PROCEDURE — P9040: CPT

## 2024-11-19 PROCEDURE — 80048 BASIC METABOLIC PNL TOTAL CA: CPT

## 2024-11-19 PROCEDURE — 96411 CHEMO IV PUSH ADDL DRUG: CPT

## 2024-11-19 PROCEDURE — 96416 CHEMO PROLONG INFUSE W/PUMP: CPT

## 2024-11-19 PROCEDURE — 85027 COMPLETE CBC AUTOMATED: CPT

## 2024-11-19 PROCEDURE — 86901 BLOOD TYPING SEROLOGIC RH(D): CPT

## 2024-11-19 PROCEDURE — 96367 TX/PROPH/DG ADDL SEQ IV INF: CPT

## 2024-11-19 RX ORDER — ONDANSETRON 4 MG/1
16 TABLET, ORALLY DISINTEGRATING ORAL ONCE
Refills: 0 | Status: COMPLETED | OUTPATIENT
Start: 2024-11-19 | End: 2024-11-19

## 2024-11-19 RX ORDER — LEUCOVORIN CALCIUM 50 MG
300 VIAL (EA) INJECTION ONCE
Refills: 0 | Status: COMPLETED | OUTPATIENT
Start: 2024-11-19 | End: 2024-11-19

## 2024-11-19 RX ORDER — HYDRALAZINE HCL 100 MG
50 TABLET ORAL ONCE
Refills: 0 | Status: COMPLETED | OUTPATIENT
Start: 2024-11-19 | End: 2024-11-19

## 2024-11-19 RX ORDER — OXALIPLATIN 5 MG/ML
90 INJECTION, SOLUTION INTRAVENOUS ONCE
Refills: 0 | Status: COMPLETED | OUTPATIENT
Start: 2024-11-19 | End: 2024-11-19

## 2024-11-19 RX ORDER — FOSAPREPITANT 150 MG/5ML
150 INJECTION, POWDER, LYOPHILIZED, FOR SOLUTION INTRAVENOUS ONCE
Refills: 0 | Status: COMPLETED | OUTPATIENT
Start: 2024-11-19 | End: 2024-11-19

## 2024-11-19 RX ORDER — NIFEDIPINE 90 MG/1
60 TABLET, FILM COATED, EXTENDED RELEASE ORAL ONCE
Refills: 0 | Status: DISCONTINUED | OUTPATIENT
Start: 2024-11-19 | End: 2024-11-19

## 2024-11-19 RX ORDER — METOPROLOL SUCCINATE 25 MG
25 TABLET, EXTENDED RELEASE 24 HR ORAL ONCE
Refills: 0 | Status: COMPLETED | OUTPATIENT
Start: 2024-11-19 | End: 2024-11-19

## 2024-11-19 RX ORDER — NIFEDIPINE 90 MG/1
60 TABLET, FILM COATED, EXTENDED RELEASE ORAL ONCE
Refills: 0 | Status: COMPLETED | OUTPATIENT
Start: 2024-11-19 | End: 2024-11-19

## 2024-11-19 RX ORDER — FLUOROURACIL 50 MG/ML
300 INJECTION, SOLUTION INTRAVENOUS ONCE
Refills: 0 | Status: COMPLETED | OUTPATIENT
Start: 2024-11-19 | End: 2024-11-19

## 2024-11-19 RX ORDER — DEXAMETHASONE SODIUM PHOSPHATE 4 MG/ML
12 INJECTION, SOLUTION INTRA-ARTICULAR; INTRALESIONAL; INTRAMUSCULAR; INTRAVENOUS; SOFT TISSUE ONCE
Refills: 0 | Status: COMPLETED | OUTPATIENT
Start: 2024-11-19 | End: 2024-11-19

## 2024-11-19 RX ORDER — FLUOROURACIL 50 MG/ML
2600 INJECTION, SOLUTION INTRAVENOUS ONCE
Refills: 0 | Status: COMPLETED | OUTPATIENT
Start: 2024-11-19 | End: 2024-11-19

## 2024-11-19 RX ADMIN — OXALIPLATIN 90 MILLIGRAM(S): 5 INJECTION, SOLUTION INTRAVENOUS at 14:30

## 2024-11-19 RX ADMIN — FLUOROURACIL 72 MILLIGRAM(S): 50 INJECTION, SOLUTION INTRAVENOUS at 10:53

## 2024-11-19 RX ADMIN — Medication 25 MILLIGRAM(S): at 11:04

## 2024-11-19 RX ADMIN — ONDANSETRON 116 MILLIGRAM(S): 4 TABLET, ORALLY DISINTEGRATING ORAL at 10:50

## 2024-11-19 RX ADMIN — FOSAPREPITANT 500 MILLIGRAM(S): 150 INJECTION, POWDER, LYOPHILIZED, FOR SOLUTION INTRAVENOUS at 11:20

## 2024-11-19 RX ADMIN — Medication 300 MILLIGRAM(S): at 12:30

## 2024-11-19 RX ADMIN — Medication 300 MILLIGRAM(S): at 14:30

## 2024-11-19 RX ADMIN — FOSAPREPITANT 150 MILLIGRAM(S): 150 INJECTION, POWDER, LYOPHILIZED, FOR SOLUTION INTRAVENOUS at 11:50

## 2024-11-19 RX ADMIN — ONDANSETRON 16 MILLIGRAM(S): 4 TABLET, ORALLY DISINTEGRATING ORAL at 11:05

## 2024-11-19 RX ADMIN — DEXAMETHASONE SODIUM PHOSPHATE 106 MILLIGRAM(S): 4 INJECTION, SOLUTION INTRA-ARTICULAR; INTRALESIONAL; INTRAMUSCULAR; INTRAVENOUS; SOFT TISSUE at 11:05

## 2024-11-19 RX ADMIN — DEXAMETHASONE SODIUM PHOSPHATE 12 MILLIGRAM(S): 4 INJECTION, SOLUTION INTRA-ARTICULAR; INTRALESIONAL; INTRAMUSCULAR; INTRAVENOUS; SOFT TISSUE at 11:20

## 2024-11-19 RX ADMIN — Medication 50 MILLIGRAM(S): at 11:04

## 2024-11-19 RX ADMIN — OXALIPLATIN 90 MILLIGRAM(S): 5 INJECTION, SOLUTION INTRAVENOUS at 12:30

## 2024-11-19 RX ADMIN — NIFEDIPINE 60 MILLIGRAM(S): 90 TABLET, FILM COATED, EXTENDED RELEASE ORAL at 11:04

## 2024-11-19 RX ADMIN — FLUOROURACIL 2600 MILLIGRAM(S): 50 INJECTION, SOLUTION INTRAVENOUS at 10:53

## 2024-11-20 ENCOUNTER — NON-APPOINTMENT (OUTPATIENT)
Age: 79
End: 2024-11-20

## 2024-11-20 DIAGNOSIS — C18.9 MALIGNANT NEOPLASM OF COLON, UNSPECIFIED: ICD-10-CM

## 2024-11-21 ENCOUNTER — APPOINTMENT (OUTPATIENT)
Age: 79
End: 2024-11-21

## 2024-11-21 ENCOUNTER — OUTPATIENT (OUTPATIENT)
Dept: OUTPATIENT SERVICES | Facility: HOSPITAL | Age: 79
LOS: 1 days | End: 2024-11-21
Payer: MEDICAID

## 2024-11-21 DIAGNOSIS — C18.9 MALIGNANT NEOPLASM OF COLON, UNSPECIFIED: ICD-10-CM

## 2024-11-21 PROCEDURE — 99211 OFF/OP EST MAY X REQ PHY/QHP: CPT

## 2024-11-21 PROCEDURE — 36430 TRANSFUSION BLD/BLD COMPNT: CPT

## 2024-11-21 PROCEDURE — P9040: CPT

## 2024-11-26 ENCOUNTER — LABORATORY RESULT (OUTPATIENT)
Age: 79
End: 2024-11-26

## 2024-11-26 ENCOUNTER — APPOINTMENT (OUTPATIENT)
Age: 79
End: 2024-11-26
Payer: MEDICAID

## 2024-11-26 ENCOUNTER — OUTPATIENT (OUTPATIENT)
Dept: OUTPATIENT SERVICES | Facility: HOSPITAL | Age: 79
LOS: 1 days | End: 2024-11-26
Payer: MEDICAID

## 2024-11-26 VITALS
TEMPERATURE: 97.3 F | HEART RATE: 77 BPM | OXYGEN SATURATION: 98 % | DIASTOLIC BLOOD PRESSURE: 77 MMHG | RESPIRATION RATE: 14 BRPM | BODY MASS INDEX: 22.18 KG/M2 | SYSTOLIC BLOOD PRESSURE: 158 MMHG | WEIGHT: 120.5 LBS | HEIGHT: 62 IN

## 2024-11-26 DIAGNOSIS — C18.9 MALIGNANT NEOPLASM OF COLON, UNSPECIFIED: ICD-10-CM

## 2024-11-26 PROCEDURE — 99213 OFFICE O/P EST LOW 20 MIN: CPT

## 2024-11-26 PROCEDURE — 85027 COMPLETE CBC AUTOMATED: CPT

## 2024-11-26 PROCEDURE — G2211 COMPLEX E/M VISIT ADD ON: CPT

## 2024-11-27 LAB
HCT VFR BLD CALC: 28.9 %
HGB BLD-MCNC: 9.2 G/DL
MCHC RBC-ENTMCNC: 25.5 PG
MCHC RBC-ENTMCNC: 31.8 G/DL
MCV RBC AUTO: 80.1 FL
PLATELET # BLD AUTO: 163 K/UL
PMV BLD: 9.3 FL
RBC # BLD: 3.61 M/UL
RBC # FLD: 17.2 %
WBC # FLD AUTO: 3.79 K/UL

## 2024-12-03 ENCOUNTER — LABORATORY RESULT (OUTPATIENT)
Age: 79
End: 2024-12-03

## 2024-12-03 ENCOUNTER — APPOINTMENT (OUTPATIENT)
Age: 79
End: 2024-12-03
Payer: MEDICAID

## 2024-12-03 ENCOUNTER — OUTPATIENT (OUTPATIENT)
Dept: OUTPATIENT SERVICES | Facility: HOSPITAL | Age: 79
LOS: 1 days | End: 2024-12-03
Payer: MEDICAID

## 2024-12-03 VITALS
HEART RATE: 74 BPM | OXYGEN SATURATION: 100 % | TEMPERATURE: 97.4 F | HEIGHT: 61.5 IN | DIASTOLIC BLOOD PRESSURE: 66 MMHG | BODY MASS INDEX: 21.81 KG/M2 | SYSTOLIC BLOOD PRESSURE: 135 MMHG | WEIGHT: 117 LBS

## 2024-12-03 VITALS — TEMPERATURE: 97 F | HEART RATE: 74 BPM | DIASTOLIC BLOOD PRESSURE: 66 MMHG | SYSTOLIC BLOOD PRESSURE: 135 MMHG

## 2024-12-03 DIAGNOSIS — C79.60 MALIGNANT NEOPLASM OF COLON, UNSPECIFIED: ICD-10-CM

## 2024-12-03 DIAGNOSIS — C18.9 MALIGNANT NEOPLASM OF COLON, UNSPECIFIED: ICD-10-CM

## 2024-12-03 LAB
ALBUMIN SERPL ELPH-MCNC: 4.1 G/DL
ALP BLD-CCNC: 70 U/L
ALT SERPL-CCNC: 7 U/L
ANION GAP SERPL CALC-SCNC: 12 MMOL/L
AST SERPL-CCNC: 15 U/L
BILIRUB SERPL-MCNC: 0.2 MG/DL
BUN SERPL-MCNC: 34 MG/DL
CALCIUM SERPL-MCNC: 9 MG/DL
CHLORIDE SERPL-SCNC: 101 MMOL/L
CO2 SERPL-SCNC: 24 MMOL/L
CREAT SERPL-MCNC: 1.1 MG/DL
EGFR: 52 ML/MIN/1.73M2
GLUCOSE SERPL-MCNC: 115 MG/DL
HCT VFR BLD CALC: 29.4 %
HGB BLD-MCNC: 9.6 G/DL
MCHC RBC-ENTMCNC: 25.6 PG
MCHC RBC-ENTMCNC: 32.7 G/DL
MCV RBC AUTO: 78.4 FL
PLATELET # BLD AUTO: 146 K/UL
PMV BLD: 9.5 FL
POTASSIUM SERPL-SCNC: 4.3 MMOL/L
PROT SERPL-MCNC: 6.9 G/DL
RBC # BLD: 3.75 M/UL
RBC # FLD: 18.9 %
SODIUM SERPL-SCNC: 137 MMOL/L
WBC # FLD AUTO: 3.75 K/UL

## 2024-12-03 PROCEDURE — 82378 CARCINOEMBRYONIC ANTIGEN: CPT

## 2024-12-03 PROCEDURE — 99213 OFFICE O/P EST LOW 20 MIN: CPT

## 2024-12-03 PROCEDURE — 96416 CHEMO PROLONG INFUSE W/PUMP: CPT

## 2024-12-03 PROCEDURE — 85027 COMPLETE CBC AUTOMATED: CPT

## 2024-12-03 PROCEDURE — 96375 TX/PRO/DX INJ NEW DRUG ADDON: CPT

## 2024-12-03 PROCEDURE — 80053 COMPREHEN METABOLIC PANEL: CPT

## 2024-12-03 PROCEDURE — 96415 CHEMO IV INFUSION ADDL HR: CPT

## 2024-12-03 PROCEDURE — 96411 CHEMO IV PUSH ADDL DRUG: CPT

## 2024-12-03 PROCEDURE — 96367 TX/PROPH/DG ADDL SEQ IV INF: CPT

## 2024-12-03 PROCEDURE — 96413 CHEMO IV INFUSION 1 HR: CPT

## 2024-12-03 RX ORDER — LEUCOVORIN CALCIUM 50 MG/5ML
360 INJECTION, POWDER, LYOPHILIZED, FOR SOLUTION INTRAMUSCULAR; INTRAVENOUS ONCE
Refills: 0 | Status: COMPLETED | OUTPATIENT
Start: 2024-12-03 | End: 2024-12-03

## 2024-12-03 RX ORDER — DEXAMETHASONE 0.5 MG/1
12 TABLET ORAL ONCE
Refills: 0 | Status: COMPLETED | OUTPATIENT
Start: 2024-12-03 | End: 2024-12-03

## 2024-12-03 RX ORDER — OXALIPLATIN 5 MG/ML
100 INJECTION, SOLUTION, CONCENTRATE INTRAVENOUS ONCE
Refills: 0 | Status: COMPLETED | OUTPATIENT
Start: 2024-12-03 | End: 2024-12-03

## 2024-12-03 RX ORDER — ONDANSETRON HCL/PF 4 MG/2 ML
16 VIAL (ML) INJECTION ONCE
Refills: 0 | Status: COMPLETED | OUTPATIENT
Start: 2024-12-03 | End: 2024-12-03

## 2024-12-03 RX ORDER — FLUOROURACIL 50 MG/ML
360 INJECTION, SOLUTION INTRAVENOUS ONCE
Refills: 0 | Status: COMPLETED | OUTPATIENT
Start: 2024-12-03 | End: 2024-12-03

## 2024-12-03 RX ORDER — FLUOROURACIL 50 MG/ML
2880 INJECTION, SOLUTION INTRAVENOUS ONCE
Refills: 0 | Status: COMPLETED | OUTPATIENT
Start: 2024-12-03 | End: 2024-12-03

## 2024-12-03 RX ORDER — FOSAPREPITANT 150 MG/5ML
150 INJECTION, POWDER, LYOPHILIZED, FOR SOLUTION INTRAVENOUS ONCE
Refills: 0 | Status: COMPLETED | OUTPATIENT
Start: 2024-12-03 | End: 2024-12-03

## 2024-12-03 RX ADMIN — OXALIPLATIN 100 MILLIGRAM(S): 5 INJECTION, SOLUTION, CONCENTRATE INTRAVENOUS at 13:14

## 2024-12-03 RX ADMIN — FOSAPREPITANT 500 MILLIGRAM(S): 150 INJECTION, POWDER, LYOPHILIZED, FOR SOLUTION INTRAVENOUS at 12:24

## 2024-12-03 RX ADMIN — Medication 116 MILLIGRAM(S): at 12:24

## 2024-12-03 RX ADMIN — FLUOROURACIL 86.4 MILLIGRAM(S): 50 INJECTION, SOLUTION INTRAVENOUS at 15:10

## 2024-12-03 RX ADMIN — DEXAMETHASONE 106 MILLIGRAM(S): 0.5 TABLET ORAL at 12:24

## 2024-12-03 RX ADMIN — LEUCOVORIN CALCIUM 360 MILLIGRAM(S): 50 INJECTION, POWDER, LYOPHILIZED, FOR SOLUTION INTRAMUSCULAR; INTRAVENOUS at 13:15

## 2024-12-03 RX ADMIN — FLUOROURACIL 2880 MILLIGRAM(S): 50 INJECTION, SOLUTION INTRAVENOUS at 15:10

## 2024-12-04 ENCOUNTER — NON-APPOINTMENT (OUTPATIENT)
Age: 79
End: 2024-12-04

## 2024-12-04 DIAGNOSIS — C18.9 MALIGNANT NEOPLASM OF COLON, UNSPECIFIED: ICD-10-CM

## 2024-12-04 LAB — CEA SERPL-MCNC: 2.1 NG/ML

## 2024-12-05 ENCOUNTER — OUTPATIENT (OUTPATIENT)
Dept: OUTPATIENT SERVICES | Facility: HOSPITAL | Age: 79
LOS: 1 days | End: 2024-12-05
Payer: MEDICAID

## 2024-12-05 ENCOUNTER — APPOINTMENT (OUTPATIENT)
Age: 79
End: 2024-12-05

## 2024-12-05 DIAGNOSIS — C18.9 MALIGNANT NEOPLASM OF COLON, UNSPECIFIED: ICD-10-CM

## 2024-12-05 PROCEDURE — 99211 OFF/OP EST MAY X REQ PHY/QHP: CPT

## 2024-12-16 ENCOUNTER — OUTPATIENT (OUTPATIENT)
Dept: OUTPATIENT SERVICES | Facility: HOSPITAL | Age: 79
LOS: 1 days | End: 2024-12-16
Payer: MEDICAID

## 2024-12-16 ENCOUNTER — APPOINTMENT (OUTPATIENT)
Age: 79
End: 2024-12-16
Payer: MEDICAID

## 2024-12-16 ENCOUNTER — LABORATORY RESULT (OUTPATIENT)
Age: 79
End: 2024-12-16

## 2024-12-16 VITALS
HEART RATE: 93 BPM | OXYGEN SATURATION: 98 % | SYSTOLIC BLOOD PRESSURE: 188 MMHG | DIASTOLIC BLOOD PRESSURE: 81 MMHG | BODY MASS INDEX: 21.62 KG/M2 | WEIGHT: 116 LBS | HEIGHT: 61.5 IN | TEMPERATURE: 97.8 F

## 2024-12-16 DIAGNOSIS — C18.9 MALIGNANT NEOPLASM OF COLON, UNSPECIFIED: ICD-10-CM

## 2024-12-16 LAB
HCT VFR BLD CALC: 27.4 %
HGB BLD-MCNC: 8.9 G/DL
MCHC RBC-ENTMCNC: 25.6 PG
MCHC RBC-ENTMCNC: 32.5 G/DL
MCV RBC AUTO: 79 FL
PLATELET # BLD AUTO: 142 K/UL
PMV BLD: 10.2 FL
RBC # BLD: 3.47 M/UL
RBC # FLD: 20.1 %
WBC # FLD AUTO: 5.07 K/UL

## 2024-12-16 PROCEDURE — 82378 CARCINOEMBRYONIC ANTIGEN: CPT

## 2024-12-16 PROCEDURE — 99214 OFFICE O/P EST MOD 30 MIN: CPT

## 2024-12-16 PROCEDURE — G2211 COMPLEX E/M VISIT ADD ON: CPT

## 2024-12-16 PROCEDURE — 80053 COMPREHEN METABOLIC PANEL: CPT

## 2024-12-16 PROCEDURE — 85027 COMPLETE CBC AUTOMATED: CPT

## 2024-12-17 ENCOUNTER — APPOINTMENT (OUTPATIENT)
Age: 79
End: 2024-12-17

## 2024-12-17 ENCOUNTER — OUTPATIENT (OUTPATIENT)
Dept: OUTPATIENT SERVICES | Facility: HOSPITAL | Age: 79
LOS: 1 days | End: 2024-12-17
Payer: MEDICAID

## 2024-12-17 VITALS — DIASTOLIC BLOOD PRESSURE: 76 MMHG | HEART RATE: 63 BPM | TEMPERATURE: 99 F | SYSTOLIC BLOOD PRESSURE: 134 MMHG

## 2024-12-17 DIAGNOSIS — C18.9 MALIGNANT NEOPLASM OF COLON, UNSPECIFIED: ICD-10-CM

## 2024-12-17 LAB
ALBUMIN SERPL ELPH-MCNC: 3.9 G/DL
ALP BLD-CCNC: 67 U/L
ALT SERPL-CCNC: 5 U/L
ANION GAP SERPL CALC-SCNC: 12 MMOL/L
AST SERPL-CCNC: 15 U/L
BILIRUB SERPL-MCNC: <0.2 MG/DL
BUN SERPL-MCNC: 34 MG/DL
CALCIUM SERPL-MCNC: 8.8 MG/DL
CEA SERPL-MCNC: 2.3 NG/ML
CHLORIDE SERPL-SCNC: 98 MMOL/L
CO2 SERPL-SCNC: 25 MMOL/L
CREAT SERPL-MCNC: 1.1 MG/DL
EGFR: 52 ML/MIN/1.73M2
GLUCOSE SERPL-MCNC: 123 MG/DL
POTASSIUM SERPL-SCNC: 4.3 MMOL/L
PROT SERPL-MCNC: 6.1 G/DL
SODIUM SERPL-SCNC: 135 MMOL/L

## 2024-12-17 PROCEDURE — 96415 CHEMO IV INFUSION ADDL HR: CPT

## 2024-12-17 PROCEDURE — 96367 TX/PROPH/DG ADDL SEQ IV INF: CPT

## 2024-12-17 PROCEDURE — 96416 CHEMO PROLONG INFUSE W/PUMP: CPT

## 2024-12-17 PROCEDURE — 96411 CHEMO IV PUSH ADDL DRUG: CPT

## 2024-12-17 PROCEDURE — 96375 TX/PRO/DX INJ NEW DRUG ADDON: CPT

## 2024-12-17 PROCEDURE — 96413 CHEMO IV INFUSION 1 HR: CPT

## 2024-12-17 RX ORDER — FLUOROURACIL 50 MG/ML
2880 INJECTION, SOLUTION INTRAVENOUS ONCE
Refills: 0 | Status: COMPLETED | OUTPATIENT
Start: 2024-12-17 | End: 2024-12-17

## 2024-12-17 RX ORDER — FOSAPREPITANT 150 MG/5ML
150 INJECTION, POWDER, LYOPHILIZED, FOR SOLUTION INTRAVENOUS ONCE
Refills: 0 | Status: COMPLETED | OUTPATIENT
Start: 2024-12-17 | End: 2024-12-17

## 2024-12-17 RX ORDER — DEXAMETHASONE 0.5 MG/1
12 TABLET ORAL ONCE
Refills: 0 | Status: COMPLETED | OUTPATIENT
Start: 2024-12-17 | End: 2024-12-17

## 2024-12-17 RX ORDER — IRON SUCROSE 20 MG/ML
200 INJECTION, SOLUTION INTRAVENOUS ONCE
Refills: 0 | Status: COMPLETED | OUTPATIENT
Start: 2024-12-17 | End: 2024-12-17

## 2024-12-17 RX ORDER — LEUCOVORIN CALCIUM 50 MG/5ML
360 INJECTION, POWDER, LYOPHILIZED, FOR SOLUTION INTRAMUSCULAR; INTRAVENOUS ONCE
Refills: 0 | Status: COMPLETED | OUTPATIENT
Start: 2024-12-17 | End: 2024-12-17

## 2024-12-17 RX ORDER — ONDANSETRON HCL/PF 4 MG/2 ML
16 VIAL (ML) INJECTION ONCE
Refills: 0 | Status: COMPLETED | OUTPATIENT
Start: 2024-12-17 | End: 2024-12-17

## 2024-12-17 RX ORDER — TRIAMCINOLONE ACETONIDE 5 MG/G
0.5 CREAM TOPICAL 3 TIMES DAILY
Qty: 2 | Refills: 0 | Status: ACTIVE | COMMUNITY
Start: 2024-12-17 | End: 1900-01-01

## 2024-12-17 RX ORDER — FLUOROURACIL 50 MG/ML
360 INJECTION, SOLUTION INTRAVENOUS ONCE
Refills: 0 | Status: COMPLETED | OUTPATIENT
Start: 2024-12-17 | End: 2024-12-17

## 2024-12-17 RX ORDER — OXALIPLATIN 5 MG/ML
100 INJECTION, SOLUTION, CONCENTRATE INTRAVENOUS ONCE
Refills: 0 | Status: COMPLETED | OUTPATIENT
Start: 2024-12-17 | End: 2024-12-17

## 2024-12-17 RX ADMIN — FOSAPREPITANT 500 MILLIGRAM(S): 150 INJECTION, POWDER, LYOPHILIZED, FOR SOLUTION INTRAVENOUS at 10:07

## 2024-12-17 RX ADMIN — FLUOROURACIL 2880 MILLIGRAM(S): 50 INJECTION, SOLUTION INTRAVENOUS at 13:51

## 2024-12-17 RX ADMIN — DEXAMETHASONE 100 MILLIGRAM(S): 0.5 TABLET ORAL at 10:03

## 2024-12-17 RX ADMIN — FLUOROURACIL 86.4 MILLIGRAM(S): 50 INJECTION, SOLUTION INTRAVENOUS at 13:50

## 2024-12-17 RX ADMIN — LEUCOVORIN CALCIUM 360 MILLIGRAM(S): 50 INJECTION, POWDER, LYOPHILIZED, FOR SOLUTION INTRAMUSCULAR; INTRAVENOUS at 11:51

## 2024-12-17 RX ADMIN — Medication 100 MILLIGRAM(S): at 10:03

## 2024-12-17 RX ADMIN — OXALIPLATIN 100 MILLIGRAM(S): 5 INJECTION, SOLUTION, CONCENTRATE INTRAVENOUS at 11:51

## 2024-12-17 RX ADMIN — IRON SUCROSE 200 MILLIGRAM(S): 20 INJECTION, SOLUTION INTRAVENOUS at 10:03

## 2024-12-19 ENCOUNTER — APPOINTMENT (OUTPATIENT)
Age: 79
End: 2024-12-19

## 2024-12-19 ENCOUNTER — OUTPATIENT (OUTPATIENT)
Dept: OUTPATIENT SERVICES | Facility: HOSPITAL | Age: 79
LOS: 1 days | End: 2024-12-19
Payer: MEDICAID

## 2024-12-19 VITALS — DIASTOLIC BLOOD PRESSURE: 50 MMHG | SYSTOLIC BLOOD PRESSURE: 105 MMHG | HEART RATE: 60 BPM | TEMPERATURE: 97 F

## 2024-12-19 DIAGNOSIS — C18.9 MALIGNANT NEOPLASM OF COLON, UNSPECIFIED: ICD-10-CM

## 2024-12-19 PROCEDURE — 99211 OFF/OP EST MAY X REQ PHY/QHP: CPT

## 2024-12-31 ENCOUNTER — APPOINTMENT (OUTPATIENT)
Age: 79
End: 2024-12-31
Payer: MEDICAID

## 2024-12-31 ENCOUNTER — LABORATORY RESULT (OUTPATIENT)
Age: 79
End: 2024-12-31

## 2024-12-31 ENCOUNTER — OUTPATIENT (OUTPATIENT)
Dept: OUTPATIENT SERVICES | Facility: HOSPITAL | Age: 79
LOS: 1 days | End: 2024-12-31
Payer: MEDICAID

## 2024-12-31 VITALS
HEART RATE: 100 BPM | SYSTOLIC BLOOD PRESSURE: 149 MMHG | OXYGEN SATURATION: 100 % | BODY MASS INDEX: 22.28 KG/M2 | TEMPERATURE: 97.3 F | DIASTOLIC BLOOD PRESSURE: 70 MMHG | RESPIRATION RATE: 16 BRPM | HEIGHT: 61 IN | WEIGHT: 118 LBS

## 2024-12-31 DIAGNOSIS — C79.60 MALIGNANT NEOPLASM OF COLON, UNSPECIFIED: ICD-10-CM

## 2024-12-31 DIAGNOSIS — C18.9 MALIGNANT NEOPLASM OF COLON, UNSPECIFIED: ICD-10-CM

## 2024-12-31 LAB
ALBUMIN SERPL ELPH-MCNC: 4.1 G/DL
ALP BLD-CCNC: 83 U/L
ALT SERPL-CCNC: 8 U/L
ANION GAP SERPL CALC-SCNC: 16 MMOL/L
AST SERPL-CCNC: 16 U/L
BILIRUB SERPL-MCNC: 0.4 MG/DL
BUN SERPL-MCNC: 29 MG/DL
CALCIUM SERPL-MCNC: 8.8 MG/DL
CHLORIDE SERPL-SCNC: 100 MMOL/L
CO2 SERPL-SCNC: 21 MMOL/L
CREAT SERPL-MCNC: 1.1 MG/DL
EGFR: 52 ML/MIN/1.73M2
GLUCOSE SERPL-MCNC: 111 MG/DL
HCT VFR BLD CALC: 28.2 %
HGB BLD-MCNC: 9.3 G/DL
MCHC RBC-ENTMCNC: 25.1 PG
MCHC RBC-ENTMCNC: 33 G/DL
MCV RBC AUTO: 76.2 FL
PLATELET # BLD AUTO: 121 K/UL
PMV BLD: 9.2 FL
POTASSIUM SERPL-SCNC: 4.3 MMOL/L
PROT SERPL-MCNC: 6.5 G/DL
RBC # BLD: 3.7 M/UL
RBC # FLD: 22.3 %
SODIUM SERPL-SCNC: 137 MMOL/L
WBC # FLD AUTO: 3.68 K/UL

## 2024-12-31 PROCEDURE — 99214 OFFICE O/P EST MOD 30 MIN: CPT

## 2024-12-31 PROCEDURE — 85027 COMPLETE CBC AUTOMATED: CPT

## 2024-12-31 PROCEDURE — 96367 TX/PROPH/DG ADDL SEQ IV INF: CPT

## 2024-12-31 PROCEDURE — 96413 CHEMO IV INFUSION 1 HR: CPT

## 2024-12-31 PROCEDURE — G2211 COMPLEX E/M VISIT ADD ON: CPT

## 2024-12-31 PROCEDURE — 96375 TX/PRO/DX INJ NEW DRUG ADDON: CPT

## 2024-12-31 PROCEDURE — 80053 COMPREHEN METABOLIC PANEL: CPT

## 2024-12-31 PROCEDURE — 96411 CHEMO IV PUSH ADDL DRUG: CPT

## 2024-12-31 PROCEDURE — 96416 CHEMO PROLONG INFUSE W/PUMP: CPT

## 2024-12-31 PROCEDURE — 82728 ASSAY OF FERRITIN: CPT

## 2024-12-31 PROCEDURE — 96415 CHEMO IV INFUSION ADDL HR: CPT

## 2024-12-31 RX ORDER — OXALIPLATIN 5 MG/ML
100 INJECTION, SOLUTION, CONCENTRATE INTRAVENOUS ONCE
Refills: 0 | Status: COMPLETED | OUTPATIENT
Start: 2024-12-31 | End: 2024-12-31

## 2024-12-31 RX ORDER — FLUOROURACIL 50 MG/ML
360 INJECTION, SOLUTION INTRAVENOUS ONCE
Refills: 0 | Status: COMPLETED | OUTPATIENT
Start: 2024-12-31 | End: 2024-12-31

## 2024-12-31 RX ORDER — IRON SUCROSE 20 MG/ML
200 INJECTION, SOLUTION INTRAVENOUS ONCE
Refills: 0 | Status: COMPLETED | OUTPATIENT
Start: 2024-12-31 | End: 2024-12-31

## 2024-12-31 RX ORDER — FOSAPREPITANT 150 MG/5ML
150 INJECTION, POWDER, LYOPHILIZED, FOR SOLUTION INTRAVENOUS ONCE
Refills: 0 | Status: COMPLETED | OUTPATIENT
Start: 2024-12-31 | End: 2024-12-31

## 2024-12-31 RX ORDER — DEXAMETHASONE 0.5 MG/1
12 TABLET ORAL ONCE
Refills: 0 | Status: COMPLETED | OUTPATIENT
Start: 2024-12-31 | End: 2024-12-31

## 2024-12-31 RX ORDER — LEUCOVORIN CALCIUM 50 MG/5ML
360 INJECTION, POWDER, LYOPHILIZED, FOR SOLUTION INTRAMUSCULAR; INTRAVENOUS ONCE
Refills: 0 | Status: COMPLETED | OUTPATIENT
Start: 2024-12-31 | End: 2024-12-31

## 2024-12-31 RX ORDER — ONDANSETRON HCL/PF 4 MG/2 ML
16 VIAL (ML) INJECTION ONCE
Refills: 0 | Status: COMPLETED | OUTPATIENT
Start: 2024-12-31 | End: 2024-12-31

## 2024-12-31 RX ORDER — FLUOROURACIL 50 MG/ML
2880 INJECTION, SOLUTION INTRAVENOUS ONCE
Refills: 0 | Status: COMPLETED | OUTPATIENT
Start: 2024-12-31 | End: 2024-12-31

## 2024-12-31 RX ADMIN — FLUOROURACIL 86.4 MILLIGRAM(S): 50 INJECTION, SOLUTION INTRAVENOUS at 11:37

## 2024-12-31 RX ADMIN — LEUCOVORIN CALCIUM 360 MILLIGRAM(S): 50 INJECTION, POWDER, LYOPHILIZED, FOR SOLUTION INTRAMUSCULAR; INTRAVENOUS at 11:36

## 2024-12-31 RX ADMIN — Medication 100 MILLIGRAM(S): at 11:00

## 2024-12-31 RX ADMIN — IRON SUCROSE 200 MILLIGRAM(S): 20 INJECTION, SOLUTION INTRAVENOUS at 10:58

## 2024-12-31 RX ADMIN — DEXAMETHASONE 100 MILLIGRAM(S): 0.5 TABLET ORAL at 11:00

## 2024-12-31 RX ADMIN — OXALIPLATIN 100 MILLIGRAM(S): 5 INJECTION, SOLUTION, CONCENTRATE INTRAVENOUS at 11:37

## 2024-12-31 RX ADMIN — FLUOROURACIL 2880 MILLIGRAM(S): 50 INJECTION, SOLUTION INTRAVENOUS at 11:36

## 2024-12-31 RX ADMIN — FOSAPREPITANT 500 MILLIGRAM(S): 150 INJECTION, POWDER, LYOPHILIZED, FOR SOLUTION INTRAVENOUS at 10:59

## 2025-01-02 ENCOUNTER — APPOINTMENT (OUTPATIENT)
Age: 80
End: 2025-01-02

## 2025-01-02 ENCOUNTER — OUTPATIENT (OUTPATIENT)
Dept: OUTPATIENT SERVICES | Facility: HOSPITAL | Age: 80
LOS: 1 days | End: 2025-01-02
Payer: MEDICAID

## 2025-01-02 VITALS — TEMPERATURE: 97 F | DIASTOLIC BLOOD PRESSURE: 62 MMHG | HEART RATE: 84 BPM | SYSTOLIC BLOOD PRESSURE: 138 MMHG

## 2025-01-02 DIAGNOSIS — C18.9 MALIGNANT NEOPLASM OF COLON, UNSPECIFIED: ICD-10-CM

## 2025-01-02 LAB — FERRITIN SERPL-MCNC: 85 NG/ML

## 2025-01-02 PROCEDURE — 99211 OFF/OP EST MAY X REQ PHY/QHP: CPT

## 2025-01-03 DIAGNOSIS — C18.9 MALIGNANT NEOPLASM OF COLON, UNSPECIFIED: ICD-10-CM

## 2025-01-09 ENCOUNTER — RESULT REVIEW (OUTPATIENT)
Age: 80
End: 2025-01-09

## 2025-01-09 ENCOUNTER — OUTPATIENT (OUTPATIENT)
Dept: OUTPATIENT SERVICES | Facility: HOSPITAL | Age: 80
LOS: 1 days | End: 2025-01-09
Payer: MEDICAID

## 2025-01-09 ENCOUNTER — APPOINTMENT (OUTPATIENT)
Dept: ULTRASOUND IMAGING | Facility: HOSPITAL | Age: 80
End: 2025-01-09
Payer: MEDICAID

## 2025-01-09 DIAGNOSIS — C18.9 MALIGNANT NEOPLASM OF COLON, UNSPECIFIED: ICD-10-CM

## 2025-01-09 PROCEDURE — 93970 EXTREMITY STUDY: CPT | Mod: 26

## 2025-01-09 PROCEDURE — 93970 EXTREMITY STUDY: CPT

## 2025-01-10 ENCOUNTER — APPOINTMENT (OUTPATIENT)
Dept: INTERVENTIONAL RADIOLOGY/VASCULAR | Facility: CLINIC | Age: 80
End: 2025-01-10
Payer: SELF-PAY

## 2025-01-10 VITALS
HEART RATE: 69 BPM | TEMPERATURE: 98 F | OXYGEN SATURATION: 98 % | DIASTOLIC BLOOD PRESSURE: 80 MMHG | SYSTOLIC BLOOD PRESSURE: 174 MMHG

## 2025-01-10 DIAGNOSIS — C18.9 MALIGNANT NEOPLASM OF COLON, UNSPECIFIED: ICD-10-CM

## 2025-01-10 PROCEDURE — 99213 OFFICE O/P EST LOW 20 MIN: CPT

## 2025-01-14 ENCOUNTER — APPOINTMENT (OUTPATIENT)
Age: 80
End: 2025-01-14
Payer: MEDICAID

## 2025-01-14 ENCOUNTER — LABORATORY RESULT (OUTPATIENT)
Age: 80
End: 2025-01-14

## 2025-01-14 ENCOUNTER — OUTPATIENT (OUTPATIENT)
Dept: OUTPATIENT SERVICES | Facility: HOSPITAL | Age: 80
LOS: 1 days | End: 2025-01-14
Payer: MEDICAID

## 2025-01-14 VITALS
DIASTOLIC BLOOD PRESSURE: 60 MMHG | OXYGEN SATURATION: 99 % | RESPIRATION RATE: 17 BRPM | TEMPERATURE: 98 F | SYSTOLIC BLOOD PRESSURE: 136 MMHG | HEART RATE: 82 BPM

## 2025-01-14 VITALS
WEIGHT: 118 LBS | OXYGEN SATURATION: 97 % | TEMPERATURE: 97.5 F | HEIGHT: 61 IN | SYSTOLIC BLOOD PRESSURE: 128 MMHG | BODY MASS INDEX: 22.28 KG/M2 | HEART RATE: 79 BPM | DIASTOLIC BLOOD PRESSURE: 54 MMHG

## 2025-01-14 DIAGNOSIS — Z51.11 ENCOUNTER FOR ANTINEOPLASTIC CHEMOTHERAPY: ICD-10-CM

## 2025-01-14 DIAGNOSIS — C79.60 MALIGNANT NEOPLASM OF COLON, UNSPECIFIED: ICD-10-CM

## 2025-01-14 DIAGNOSIS — C18.9 MALIGNANT NEOPLASM OF COLON, UNSPECIFIED: ICD-10-CM

## 2025-01-14 LAB
ALBUMIN SERPL ELPH-MCNC: 4 G/DL
ALP BLD-CCNC: 90 U/L
ALT SERPL-CCNC: 9 U/L
ANION GAP SERPL CALC-SCNC: 12 MMOL/L
AST SERPL-CCNC: 18 U/L
BILIRUB SERPL-MCNC: 0.5 MG/DL
BUN SERPL-MCNC: 28 MG/DL
CALCIUM SERPL-MCNC: 9.1 MG/DL
CHLORIDE SERPL-SCNC: 100 MMOL/L
CO2 SERPL-SCNC: 24 MMOL/L
CREAT SERPL-MCNC: 1.1 MG/DL
EGFR: 52 ML/MIN/1.73M2
GLUCOSE SERPL-MCNC: 100 MG/DL
HCT VFR BLD CALC: 30.3 %
HGB BLD-MCNC: 10.2 G/DL
MCHC RBC-ENTMCNC: 26.5 PG
MCHC RBC-ENTMCNC: 33.7 G/DL
MCV RBC AUTO: 78.7 FL
PLATELET # BLD AUTO: 107 K/UL
PMV BLD: 10.2 FL
POTASSIUM SERPL-SCNC: 4.1 MMOL/L
PROT SERPL-MCNC: 6.5 G/DL
RBC # BLD: 3.85 M/UL
RBC # FLD: 25.5 %
SODIUM SERPL-SCNC: 136 MMOL/L
WBC # FLD AUTO: 5.58 K/UL

## 2025-01-14 PROCEDURE — 82378 CARCINOEMBRYONIC ANTIGEN: CPT

## 2025-01-14 PROCEDURE — 96415 CHEMO IV INFUSION ADDL HR: CPT

## 2025-01-14 PROCEDURE — 96375 TX/PRO/DX INJ NEW DRUG ADDON: CPT

## 2025-01-14 PROCEDURE — 85027 COMPLETE CBC AUTOMATED: CPT

## 2025-01-14 PROCEDURE — 36415 COLL VENOUS BLD VENIPUNCTURE: CPT

## 2025-01-14 PROCEDURE — 96413 CHEMO IV INFUSION 1 HR: CPT

## 2025-01-14 PROCEDURE — 96416 CHEMO PROLONG INFUSE W/PUMP: CPT

## 2025-01-14 PROCEDURE — 96411 CHEMO IV PUSH ADDL DRUG: CPT

## 2025-01-14 PROCEDURE — G2211 COMPLEX E/M VISIT ADD ON: CPT

## 2025-01-14 PROCEDURE — 99213 OFFICE O/P EST LOW 20 MIN: CPT

## 2025-01-14 PROCEDURE — 80053 COMPREHEN METABOLIC PANEL: CPT

## 2025-01-14 PROCEDURE — 96367 TX/PROPH/DG ADDL SEQ IV INF: CPT

## 2025-01-14 RX ORDER — DEXAMETHASONE 0.5 MG/1
12 TABLET ORAL ONCE
Refills: 0 | Status: COMPLETED | OUTPATIENT
Start: 2025-01-14 | End: 2025-01-14

## 2025-01-14 RX ORDER — FLUOROURACIL 50 MG/ML
360 INJECTION, SOLUTION INTRAVENOUS ONCE
Refills: 0 | Status: COMPLETED | OUTPATIENT
Start: 2025-01-14 | End: 2025-01-14

## 2025-01-14 RX ORDER — FLUOROURACIL 50 MG/ML
2880 INJECTION, SOLUTION INTRAVENOUS ONCE
Refills: 0 | Status: COMPLETED | OUTPATIENT
Start: 2025-01-14 | End: 2025-01-14

## 2025-01-14 RX ORDER — ONDANSETRON HCL/PF 4 MG/2 ML
16 VIAL (ML) INJECTION ONCE
Refills: 0 | Status: COMPLETED | OUTPATIENT
Start: 2025-01-14 | End: 2025-01-14

## 2025-01-14 RX ORDER — OXALIPLATIN 5 MG/ML
100 INJECTION, SOLUTION, CONCENTRATE INTRAVENOUS ONCE
Refills: 0 | Status: COMPLETED | OUTPATIENT
Start: 2025-01-14 | End: 2025-01-14

## 2025-01-14 RX ORDER — LEUCOVORIN CALCIUM 50 MG/5ML
360 INJECTION, POWDER, LYOPHILIZED, FOR SOLUTION INTRAMUSCULAR; INTRAVENOUS ONCE
Refills: 0 | Status: COMPLETED | OUTPATIENT
Start: 2025-01-14 | End: 2025-01-14

## 2025-01-14 RX ORDER — FOSAPREPITANT 150 MG/5ML
150 INJECTION, POWDER, LYOPHILIZED, FOR SOLUTION INTRAVENOUS ONCE
Refills: 0 | Status: COMPLETED | OUTPATIENT
Start: 2025-01-14 | End: 2025-01-14

## 2025-01-14 RX ADMIN — OXALIPLATIN 100 MILLIGRAM(S): 5 INJECTION, SOLUTION, CONCENTRATE INTRAVENOUS at 12:05

## 2025-01-14 RX ADMIN — Medication 16 MILLIGRAM(S): at 11:46

## 2025-01-14 RX ADMIN — FLUOROURACIL 86.4 MILLIGRAM(S): 50 INJECTION, SOLUTION INTRAVENOUS at 14:15

## 2025-01-14 RX ADMIN — OXALIPLATIN 100 MILLIGRAM(S): 5 INJECTION, SOLUTION, CONCENTRATE INTRAVENOUS at 14:05

## 2025-01-14 RX ADMIN — DEXAMETHASONE 100 MILLIGRAM(S): 0.5 TABLET ORAL at 11:46

## 2025-01-14 RX ADMIN — Medication 100 MILLIGRAM(S): at 11:31

## 2025-01-14 RX ADMIN — FOSAPREPITANT 500 MILLIGRAM(S): 150 INJECTION, POWDER, LYOPHILIZED, FOR SOLUTION INTRAVENOUS at 11:01

## 2025-01-14 RX ADMIN — LEUCOVORIN CALCIUM 360 MILLIGRAM(S): 50 INJECTION, POWDER, LYOPHILIZED, FOR SOLUTION INTRAMUSCULAR; INTRAVENOUS at 14:05

## 2025-01-14 RX ADMIN — LEUCOVORIN CALCIUM 360 MILLIGRAM(S): 50 INJECTION, POWDER, LYOPHILIZED, FOR SOLUTION INTRAMUSCULAR; INTRAVENOUS at 12:05

## 2025-01-14 RX ADMIN — FOSAPREPITANT 150 MILLIGRAM(S): 150 INJECTION, POWDER, LYOPHILIZED, FOR SOLUTION INTRAVENOUS at 11:31

## 2025-01-14 RX ADMIN — DEXAMETHASONE 12 MILLIGRAM(S): 0.5 TABLET ORAL at 12:01

## 2025-01-14 RX ADMIN — FLUOROURACIL 2880 MILLIGRAM(S): 50 INJECTION, SOLUTION INTRAVENOUS at 14:20

## 2025-01-15 LAB — CEA SERPL-MCNC: 2.9 NG/ML

## 2025-01-15 NOTE — ASU PREOP CHECKLIST - HEIGHT IN CM
154.94
Develop coping skills.  For example, strategies and lifestyle changes that reduce negative moods, stress, and exposure to smoking cues.

## 2025-01-16 ENCOUNTER — LABORATORY RESULT (OUTPATIENT)
Age: 80
End: 2025-01-16

## 2025-01-16 ENCOUNTER — OUTPATIENT (OUTPATIENT)
Dept: OUTPATIENT SERVICES | Facility: HOSPITAL | Age: 80
LOS: 1 days | End: 2025-01-16
Payer: MEDICAID

## 2025-01-16 ENCOUNTER — APPOINTMENT (OUTPATIENT)
Age: 80
End: 2025-01-16

## 2025-01-16 DIAGNOSIS — C18.9 MALIGNANT NEOPLASM OF COLON, UNSPECIFIED: ICD-10-CM

## 2025-01-16 PROCEDURE — 80053 COMPREHEN METABOLIC PANEL: CPT

## 2025-01-16 PROCEDURE — 99211 OFF/OP EST MAY X REQ PHY/QHP: CPT

## 2025-01-16 PROCEDURE — 85027 COMPLETE CBC AUTOMATED: CPT

## 2025-01-17 LAB
ALBUMIN SERPL ELPH-MCNC: 3.7 G/DL
ALP BLD-CCNC: 74 U/L
ALT SERPL-CCNC: 9 U/L
ANION GAP SERPL CALC-SCNC: 10 MMOL/L
AST SERPL-CCNC: 16 U/L
BILIRUB SERPL-MCNC: 0.2 MG/DL
BUN SERPL-MCNC: 43 MG/DL
CALCIUM SERPL-MCNC: 8.4 MG/DL
CHLORIDE SERPL-SCNC: 99 MMOL/L
CO2 SERPL-SCNC: 26 MMOL/L
CREAT SERPL-MCNC: 1 MG/DL
EGFR: 58 ML/MIN/1.73M2
GLUCOSE SERPL-MCNC: 115 MG/DL
HCT VFR BLD CALC: 29.8 %
HGB BLD-MCNC: 9.8 G/DL
MCHC RBC-ENTMCNC: 26.6 PG
MCHC RBC-ENTMCNC: 32.9 G/DL
MCV RBC AUTO: 81 FL
PLATELET # BLD AUTO: 116 K/UL
PMV BLD: 10.6 FL
POTASSIUM SERPL-SCNC: 4.8 MMOL/L
PROT SERPL-MCNC: 5.8 G/DL
RBC # BLD: 3.68 M/UL
RBC # FLD: 25.7 %
SODIUM SERPL-SCNC: 135 MMOL/L
WBC # FLD AUTO: 4.33 K/UL

## 2025-01-28 ENCOUNTER — APPOINTMENT (OUTPATIENT)
Age: 80
End: 2025-01-28
Payer: MEDICAID

## 2025-01-28 ENCOUNTER — LABORATORY RESULT (OUTPATIENT)
Age: 80
End: 2025-01-28

## 2025-01-28 ENCOUNTER — OUTPATIENT (OUTPATIENT)
Dept: OUTPATIENT SERVICES | Facility: HOSPITAL | Age: 80
LOS: 1 days | End: 2025-01-28
Payer: MEDICAID

## 2025-01-28 VITALS — SYSTOLIC BLOOD PRESSURE: 158 MMHG | DIASTOLIC BLOOD PRESSURE: 79 MMHG | HEART RATE: 79 BPM | TEMPERATURE: 97 F

## 2025-01-28 VITALS
HEART RATE: 79 BPM | BODY MASS INDEX: 23.03 KG/M2 | HEIGHT: 61 IN | OXYGEN SATURATION: 98 % | RESPIRATION RATE: 16 BRPM | TEMPERATURE: 97.3 F | SYSTOLIC BLOOD PRESSURE: 158 MMHG | DIASTOLIC BLOOD PRESSURE: 83 MMHG | WEIGHT: 122 LBS

## 2025-01-28 DIAGNOSIS — Z51.11 ENCOUNTER FOR ANTINEOPLASTIC CHEMOTHERAPY: ICD-10-CM

## 2025-01-28 DIAGNOSIS — C18.9 MALIGNANT NEOPLASM OF COLON, UNSPECIFIED: ICD-10-CM

## 2025-01-28 DIAGNOSIS — C79.60 MALIGNANT NEOPLASM OF COLON, UNSPECIFIED: ICD-10-CM

## 2025-01-28 LAB
ALBUMIN SERPL ELPH-MCNC: 4.2 G/DL
ALP BLD-CCNC: 93 U/L
ALT SERPL-CCNC: 10 U/L
ANION GAP SERPL CALC-SCNC: 12 MMOL/L
APPEARANCE: CLEAR
AST SERPL-CCNC: 19 U/L
BILIRUB SERPL-MCNC: 0.3 MG/DL
BILIRUBIN URINE: NEGATIVE
BLOOD URINE: NEGATIVE
BUN SERPL-MCNC: 27 MG/DL
CALCIUM SERPL-MCNC: 8.9 MG/DL
CHLORIDE SERPL-SCNC: 102 MMOL/L
CO2 SERPL-SCNC: 22 MMOL/L
COLOR: YELLOW
CREAT SERPL-MCNC: 0.9 MG/DL
EGFR: 66 ML/MIN/1.73M2
GLUCOSE QUALITATIVE U: NEGATIVE MG/DL
GLUCOSE SERPL-MCNC: 98 MG/DL
HCT VFR BLD CALC: 30.5 %
HGB BLD-MCNC: 10.4 G/DL
KETONES URINE: NEGATIVE MG/DL
LEUKOCYTE ESTERASE URINE: ABNORMAL
MCHC RBC-ENTMCNC: 27.9 PG
MCHC RBC-ENTMCNC: 34.1 G/DL
MCV RBC AUTO: 81.8 FL
NITRITE URINE: NEGATIVE
PH URINE: 5.5
PLATELET # BLD AUTO: 110 K/UL
PMV BLD: NORMAL
POTASSIUM SERPL-SCNC: 4.3 MMOL/L
PROT SERPL-MCNC: 6.4 G/DL
PROTEIN URINE: NORMAL MG/DL
RBC # BLD: 3.73 M/UL
RBC # FLD: 26 %
SODIUM SERPL-SCNC: 136 MMOL/L
SPECIFIC GRAVITY URINE: 1.01
UROBILINOGEN URINE: 0.2 MG/DL
WBC # FLD AUTO: 4.61 K/UL

## 2025-01-28 PROCEDURE — 99213 OFFICE O/P EST LOW 20 MIN: CPT

## 2025-01-28 PROCEDURE — 96416 CHEMO PROLONG INFUSE W/PUMP: CPT

## 2025-01-28 PROCEDURE — G2211 COMPLEX E/M VISIT ADD ON: CPT

## 2025-01-28 PROCEDURE — 36415 COLL VENOUS BLD VENIPUNCTURE: CPT

## 2025-01-28 PROCEDURE — 81001 URINALYSIS AUTO W/SCOPE: CPT

## 2025-01-28 PROCEDURE — 96413 CHEMO IV INFUSION 1 HR: CPT

## 2025-01-28 PROCEDURE — 85027 COMPLETE CBC AUTOMATED: CPT

## 2025-01-28 PROCEDURE — 96367 TX/PROPH/DG ADDL SEQ IV INF: CPT

## 2025-01-28 PROCEDURE — 96415 CHEMO IV INFUSION ADDL HR: CPT

## 2025-01-28 PROCEDURE — 96411 CHEMO IV PUSH ADDL DRUG: CPT

## 2025-01-28 PROCEDURE — 96375 TX/PRO/DX INJ NEW DRUG ADDON: CPT

## 2025-01-28 PROCEDURE — 80053 COMPREHEN METABOLIC PANEL: CPT

## 2025-01-28 PROCEDURE — 82378 CARCINOEMBRYONIC ANTIGEN: CPT

## 2025-01-28 RX ORDER — DEXAMETHASONE 0.5 MG/1
12 TABLET ORAL ONCE
Refills: 0 | Status: COMPLETED | OUTPATIENT
Start: 2025-01-28 | End: 2025-01-28

## 2025-01-28 RX ORDER — FOSAPREPITANT 150 MG/5ML
150 INJECTION, POWDER, LYOPHILIZED, FOR SOLUTION INTRAVENOUS ONCE
Refills: 0 | Status: COMPLETED | OUTPATIENT
Start: 2025-01-28 | End: 2025-01-28

## 2025-01-28 RX ORDER — FLUOROURACIL 50 MG/ML
360 INJECTION, SOLUTION INTRAVENOUS ONCE
Refills: 0 | Status: COMPLETED | OUTPATIENT
Start: 2025-01-28 | End: 2025-01-28

## 2025-01-28 RX ORDER — ONDANSETRON HCL/PF 4 MG/2 ML
16 VIAL (ML) INJECTION ONCE
Refills: 0 | Status: COMPLETED | OUTPATIENT
Start: 2025-01-28 | End: 2025-01-28

## 2025-01-28 RX ORDER — LEUCOVORIN CALCIUM 50 MG/5ML
360 INJECTION, POWDER, LYOPHILIZED, FOR SOLUTION INTRAMUSCULAR; INTRAVENOUS ONCE
Refills: 0 | Status: COMPLETED | OUTPATIENT
Start: 2025-01-28 | End: 2025-01-28

## 2025-01-28 RX ORDER — FLUOROURACIL 50 MG/ML
2880 INJECTION, SOLUTION INTRAVENOUS ONCE
Refills: 0 | Status: COMPLETED | OUTPATIENT
Start: 2025-01-28 | End: 2025-01-28

## 2025-01-28 RX ORDER — OXALIPLATIN 5 MG/ML
100 INJECTION, SOLUTION, CONCENTRATE INTRAVENOUS ONCE
Refills: 0 | Status: COMPLETED | OUTPATIENT
Start: 2025-01-28 | End: 2025-01-28

## 2025-01-28 RX ADMIN — DEXAMETHASONE 100 MILLIGRAM(S): 0.5 TABLET ORAL at 12:20

## 2025-01-28 RX ADMIN — Medication 16 MILLIGRAM(S): at 12:20

## 2025-01-28 RX ADMIN — LEUCOVORIN CALCIUM 360 MILLIGRAM(S): 50 INJECTION, POWDER, LYOPHILIZED, FOR SOLUTION INTRAMUSCULAR; INTRAVENOUS at 13:04

## 2025-01-28 RX ADMIN — LEUCOVORIN CALCIUM 360 MILLIGRAM(S): 50 INJECTION, POWDER, LYOPHILIZED, FOR SOLUTION INTRAMUSCULAR; INTRAVENOUS at 15:05

## 2025-01-28 RX ADMIN — FLUOROURACIL 2880 MILLIGRAM(S): 50 INJECTION, SOLUTION INTRAVENOUS at 15:20

## 2025-01-28 RX ADMIN — FLUOROURACIL 86.4 MILLIGRAM(S): 50 INJECTION, SOLUTION INTRAVENOUS at 15:10

## 2025-01-28 RX ADMIN — FOSAPREPITANT 150 MILLIGRAM(S): 150 INJECTION, POWDER, LYOPHILIZED, FOR SOLUTION INTRAVENOUS at 13:00

## 2025-01-28 RX ADMIN — FOSAPREPITANT 500 MILLIGRAM(S): 150 INJECTION, POWDER, LYOPHILIZED, FOR SOLUTION INTRAVENOUS at 12:30

## 2025-01-28 RX ADMIN — OXALIPLATIN 100 MILLIGRAM(S): 5 INJECTION, SOLUTION, CONCENTRATE INTRAVENOUS at 15:05

## 2025-01-28 RX ADMIN — Medication 100 MILLIGRAM(S): at 12:08

## 2025-01-28 RX ADMIN — DEXAMETHASONE 12 MILLIGRAM(S): 0.5 TABLET ORAL at 12:30

## 2025-01-28 RX ADMIN — OXALIPLATIN 100 MILLIGRAM(S): 5 INJECTION, SOLUTION, CONCENTRATE INTRAVENOUS at 13:03

## 2025-01-29 LAB — CEA SERPL-MCNC: 3.4 NG/ML

## 2025-01-30 ENCOUNTER — OUTPATIENT (OUTPATIENT)
Dept: OUTPATIENT SERVICES | Facility: HOSPITAL | Age: 80
LOS: 1 days | End: 2025-01-30
Payer: MEDICAID

## 2025-01-30 ENCOUNTER — APPOINTMENT (OUTPATIENT)
Age: 80
End: 2025-01-30

## 2025-01-30 VITALS — SYSTOLIC BLOOD PRESSURE: 151 MMHG | TEMPERATURE: 98 F | HEART RATE: 57 BPM | DIASTOLIC BLOOD PRESSURE: 86 MMHG

## 2025-01-30 DIAGNOSIS — C18.9 MALIGNANT NEOPLASM OF COLON, UNSPECIFIED: ICD-10-CM

## 2025-01-30 PROCEDURE — 99211 OFF/OP EST MAY X REQ PHY/QHP: CPT

## 2025-02-05 ENCOUNTER — OUTPATIENT (OUTPATIENT)
Dept: OUTPATIENT SERVICES | Facility: HOSPITAL | Age: 80
LOS: 1 days | Discharge: HOME | End: 2025-02-05
Payer: COMMERCIAL

## 2025-02-05 ENCOUNTER — TRANSCRIPTION ENCOUNTER (OUTPATIENT)
Age: 80
End: 2025-02-05

## 2025-02-05 ENCOUNTER — RESULT REVIEW (OUTPATIENT)
Age: 80
End: 2025-02-05

## 2025-02-05 VITALS
RESPIRATION RATE: 18 BRPM | DIASTOLIC BLOOD PRESSURE: 84 MMHG | SYSTOLIC BLOOD PRESSURE: 172 MMHG | OXYGEN SATURATION: 95 % | HEART RATE: 60 BPM

## 2025-02-05 VITALS
SYSTOLIC BLOOD PRESSURE: 186 MMHG | TEMPERATURE: 97 F | HEIGHT: 60 IN | RESPIRATION RATE: 18 BRPM | OXYGEN SATURATION: 98 % | DIASTOLIC BLOOD PRESSURE: 86 MMHG | WEIGHT: 121.25 LBS | HEART RATE: 66 BPM

## 2025-02-05 DIAGNOSIS — C18.9 MALIGNANT NEOPLASM OF COLON, UNSPECIFIED: ICD-10-CM

## 2025-02-05 LAB
APTT BLD: 36.1 SEC — SIGNIFICANT CHANGE UP (ref 27–39.2)
INR BLD: 1.08 RATIO — SIGNIFICANT CHANGE UP (ref 0.65–1.3)
PROTHROM AB SERPL-ACNC: 12.8 SEC — SIGNIFICANT CHANGE UP (ref 9.95–12.87)

## 2025-02-05 PROCEDURE — C1769: CPT

## 2025-02-05 PROCEDURE — 37193 REM ENDOVAS VENA CAVA FILTER: CPT

## 2025-02-05 PROCEDURE — 85610 PROTHROMBIN TIME: CPT

## 2025-02-05 PROCEDURE — 36415 COLL VENOUS BLD VENIPUNCTURE: CPT

## 2025-02-05 PROCEDURE — C1773: CPT

## 2025-02-05 PROCEDURE — 85730 THROMBOPLASTIN TIME PARTIAL: CPT

## 2025-02-05 PROCEDURE — C1887: CPT

## 2025-02-05 NOTE — ASU DISCHARGE PLAN (ADULT/PEDIATRIC) - NS MD DC FALL RISK RISK
For information on Fall & Injury Prevention, visit: [email or web address] OR  [email or web address] OR  https://www.cdc.gov/steadi/patient.html

## 2025-02-05 NOTE — ASU DISCHARGE PLAN (ADULT/PEDIATRIC) - FINANCIAL ASSISTANCE
[facility] provides services at a reduced cost to those who are determined to be eligible through [facility]’s financial assistance program. Information regarding [facility]’s financial assistance program can be found by going to [email or web address] or by calling [phone].

## 2025-02-05 NOTE — ASU DISCHARGE PLAN (ADULT/PEDIATRIC) - DISCHARGE PLAN IS COMPLETE AND GIVEN TO PATIENT
Shameka 45 Transitions Initial Follow Up Call    Call within 2 business days of discharge: Yes    Patient: Mike Dacosta Patient : 1964   MRN: [id number]  Reason for Admission: New onset type 2 diabetes  Discharge Date: 20 RARS: Readmission Risk Score: 13      Last Discharge [facility]       Complaint Diagnosis Description Type Department Provider    20 Hyperglycemia New onset type 2 diabetes mellitus ([facility] [address] . .. ED to Hosp-Admission (Discharged) (ADMITTED) ZEN 5WE Juan Manuel Dubois DO; [employer]. .. Spoke with: Mike Dacosta, patient and patient's wife [place] on HIPAA form    Facility: [facility]    Non-face-to-face services provided:  Scheduled appointment with PCP-Verified with patient's wife appt with Dr. Shana Stiles on 2/10/20 at 1400. Patient's wife to transport patient to appt as well as attend appt. Obtained and reviewed discharge summary and/or continuity of care documents  [facility]. Care Transitions 24 Hour Call    Do you have any ongoing symptoms?:  Yes  Patient-reported symptoms:  Other (Comment: lightheaded, excessive thirst, excessive urination)  Interventions for patient-reported symptoms:  Notified PCP/Physician  Do you have a copy of your discharge instructions?:  Yes  Do you have all of your prescriptions and are they filled?:  Yes  Have you been contacted by a [address]?:  No  Have you scheduled your follow up appointment?:  Yes  How are you going to get to your appointment?:  Car - family or friend to transport  Were you discharged with any Home Care or Post Acute Services:  No  Do you feel like you have everything you need to keep you well at home?:  No  Care Transitions Interventions  No Identified Needs     DME Assistance:  Completed                                Spoke with patient and his wife for initial care transition call post hospital discharge. Med review completed; [id number] entered.   Patient voicing : Yes

## 2025-02-05 NOTE — CHART NOTE - NSCHARTNOTEFT_GEN_A_CORE
PACU ANESTHESIA ADMISSION NOTE      Procedure:   Post op diagnosis:      ____  Intubated  TV:______       Rate: ______      FiO2: ______    _x___  Patent Airway    __x__  Full return of protective reflexes    _x___  Full recovery from anesthesia / back to baseline     Vitals:   T:  98         R: 12                 BP:158/87                  Sat: 99                  P: 80      Mental Status:  ___x_ Awake   __x___ Alert   _____ Drowsy   _____ Sedated    Nausea/Vomiting:  __x__ NO  ______Yes,   See Post - Op Orders          Pain Scale (0-10):  _____    Treatment: __x__ None    ____ See Post - Op/PCA Orders    Post - Operative Fluids:   ____ Oral   _x___ See Post - Op Orders    Plan: Discharge:   ____Home       __x___Floor     _____Critical Care    _____  Other:_________________    Comments:

## 2025-02-05 NOTE — PROGRESS NOTE ADULT - SUBJECTIVE AND OBJECTIVE BOX
Interventional Radiology Outpatient Documentation    PREOPERATIVE DAY OF PROCEDURE EVALUATION:     I have personally seen and examined this patient. I agree with the history and physical from heme/onc on 1/31/2025 which I have reviewed and noted any changes below:     Plan is for image-guided IVC filter removal with sedation by anesthesia  (Signed electronically) 02-05-25 @ 13:14     Procedure/ risks/ benefits/ goals/ alternatives were explained. All questions answered. Informed content obtained from patient. Consent placed in chart.         
PROCEDURE: Inferior vena cava (IVC) filter retrieval    Procedural Personnel  Attending(s): Dr. Alexandre Moreno Attending, Radiology  Fellow(s): None  Resident(s): None  Advanced practice provider(s): None    Pre-procedure diagnosis: History of lower extremity DVT and was not getting anticoagulation for a period of time due to pelvic surgery. The surgery is now over and the patient is on anticoagulation.  The DVT has also resolved.  Post-procedure diagnosis: Same  Additional clinical history: None    Complications: No immediate complications.    IMPRESSION:    Technically successful inferior vena cava filter retrieval.    Plan:   Resume anticoagulation tomorrow.  _______________________________________________________________    PROCEDURE SUMMARY:  IVC filter retrieval with fluoroscopic guidance    PROCEDURE DETAILS:    Pre-procedure  Informed consent for the procedure was obtained and time-out was performed prior to the procedure.  Preparation (MIPS): The site was prepared and draped using all elements of maximal sterile barrier technique including sterile gloves, sterile gown, cap, mask, large sterile sheet, sterile ultrasound probe cover, hand hygiene and cutaneous antisepsis with 2% chlorhexidine.   Medical reason for site preparation exception (MIPS): Not applicable    Anesthesia/sedation  Level of anesthesia/sedation: Deep sedation (monitored anesthesia care)  Anesthesia/sedation administered by: Anesthesiology    Access  Local anesthesia was administered. The vessel was sonographically evaluated and judged appropriate for access. Real time ultrasound was used to visualize needle entry into the vessel and a permanent image was stored.  Access vein side: Right  Vein accessed: Internal jugular vein  Access vein ultrasound findings: Patent  Access technique: 5F micropuncture set    IVC venography  Vein catheterized: IVC  Indication for venography: Document position of filter within the IVC and evaluate for intra-filter thrombus  IVC patency: Patent  Indwelling filter: Option Elite    Filter position: Infrarenal IVC  Filter integrity: Intact  Intra-filter thrombus: Not present  Leg penetration: Not present  Hook position: Not embedded  Additional findings: None    Filter retrieval  A single retrieval sheath (Argon) was advanced.  Retrieval sheath size(s): 11 Setswana  The filter was captured, collapsed into the sheath, and removed in its entirety.  Filter capture technique: Standard technique, employing the clover snare included in the filter retrieval kit    Post-retrieval venography  Post-retrieval venography of the IVC was performed.  IVC patency post: Patent  Additional findings: No evidence of extravasation or pseudoaneurysm.    Closure  The sheath was removed and hemostasis was achieved with manual compression. Sterile dressing(s) applied.    Contrast  Contrast volume: 95 mL    Additional Details  Additional description of procedure: None  Additional findings: None  Equipment details: None  Specimens removed: IVC filter  Estimated blood loss:  Less than 10 mL

## 2025-02-05 NOTE — ASU DISCHARGE PLAN (ADULT/PEDIATRIC) - ASU DC SPECIAL INSTRUCTIONSFT
Resume anticoagulation tomorrow if no signs of bleeding    If bleeding from your neck hold pressure and come to the ED

## 2025-02-07 DIAGNOSIS — Z45.89 ENCOUNTER FOR ADJUSTMENT AND MANAGEMENT OF OTHER IMPLANTED DEVICES: ICD-10-CM

## 2025-02-07 DIAGNOSIS — Z86.718 PERSONAL HISTORY OF OTHER VENOUS THROMBOSIS AND EMBOLISM: ICD-10-CM

## 2025-02-11 ENCOUNTER — APPOINTMENT (OUTPATIENT)
Age: 80
End: 2025-02-11

## 2025-02-11 ENCOUNTER — OUTPATIENT (OUTPATIENT)
Dept: OUTPATIENT SERVICES | Facility: HOSPITAL | Age: 80
LOS: 1 days | End: 2025-02-11
Payer: MEDICAID

## 2025-02-11 ENCOUNTER — RESULT REVIEW (OUTPATIENT)
Age: 80
End: 2025-02-11

## 2025-02-11 DIAGNOSIS — C18.9 MALIGNANT NEOPLASM OF COLON, UNSPECIFIED: ICD-10-CM

## 2025-02-11 PROCEDURE — 74177 CT ABD & PELVIS W/CONTRAST: CPT

## 2025-02-11 PROCEDURE — 71260 CT THORAX DX C+: CPT

## 2025-02-11 PROCEDURE — 74177 CT ABD & PELVIS W/CONTRAST: CPT | Mod: 26

## 2025-02-11 PROCEDURE — 71260 CT THORAX DX C+: CPT | Mod: 26

## 2025-02-12 DIAGNOSIS — C18.9 MALIGNANT NEOPLASM OF COLON, UNSPECIFIED: ICD-10-CM

## 2025-02-13 ENCOUNTER — APPOINTMENT (OUTPATIENT)
Age: 80
End: 2025-02-13

## 2025-02-18 ENCOUNTER — APPOINTMENT (OUTPATIENT)
Age: 80
End: 2025-02-18
Payer: MEDICAID

## 2025-02-18 ENCOUNTER — OUTPATIENT (OUTPATIENT)
Dept: OUTPATIENT SERVICES | Facility: HOSPITAL | Age: 80
LOS: 1 days | End: 2025-02-18
Payer: MEDICAID

## 2025-02-18 VITALS
OXYGEN SATURATION: 97 % | TEMPERATURE: 97.6 F | HEIGHT: 61 IN | SYSTOLIC BLOOD PRESSURE: 139 MMHG | DIASTOLIC BLOOD PRESSURE: 71 MMHG | HEART RATE: 83 BPM | RESPIRATION RATE: 16 BRPM | BODY MASS INDEX: 23.03 KG/M2 | WEIGHT: 122 LBS

## 2025-02-18 DIAGNOSIS — C79.60 MALIGNANT NEOPLASM OF COLON, UNSPECIFIED: ICD-10-CM

## 2025-02-18 DIAGNOSIS — Z51.11 ENCOUNTER FOR ANTINEOPLASTIC CHEMOTHERAPY: ICD-10-CM

## 2025-02-18 DIAGNOSIS — C18.9 MALIGNANT NEOPLASM OF COLON, UNSPECIFIED: ICD-10-CM

## 2025-02-18 LAB
ALBUMIN SERPL ELPH-MCNC: 3.9 G/DL
ALP BLD-CCNC: 83 U/L
ALT SERPL-CCNC: 10 U/L
ANION GAP SERPL CALC-SCNC: 13 MMOL/L
AST SERPL-CCNC: 24 U/L
AUTO BASOPHILS #: 0 K/UL
AUTO BASOPHILS %: 0 %
AUTO EOSINOPHILS #: 0 K/UL
AUTO EOSINOPHILS %: 0 %
AUTO IMMATURE GRANULOCYTES #: 0.04 K/UL
AUTO LYMPHOCYTES #: 1.39 K/UL
AUTO LYMPHOCYTES %: 40.9 %
AUTO MONOCYTES #: 0.67 K/UL
AUTO MONOCYTES %: 19.7 %
AUTO NEUTROPHILS #: 1.3 K/UL
AUTO NEUTROPHILS %: 38.2 %
AUTO NRBC #: 0 K/UL
BILIRUB SERPL-MCNC: 0.3 MG/DL
BUN SERPL-MCNC: 29 MG/DL
CALCIUM SERPL-MCNC: 9.2 MG/DL
CHLORIDE SERPL-SCNC: 98 MMOL/L
CO2 SERPL-SCNC: 25 MMOL/L
CREAT SERPL-MCNC: 1.1 MG/DL
EGFR: 52 ML/MIN/1.73M2
GLUCOSE SERPL-MCNC: 144 MG/DL
HCT VFR BLD CALC: 34.4 %
HGB BLD-MCNC: 11.5 G/DL
IMM GRANULOCYTES NFR BLD AUTO: 1.2 %
MAN DIFF?: NORMAL
MCHC RBC-ENTMCNC: 29.3 PG
MCHC RBC-ENTMCNC: 33.4 G/DL
MCV RBC AUTO: 87.8 FL
PLATELET # BLD AUTO: 119 K/UL
PMV BLD AUTO: 0 /100 WBCS
PMV BLD: 9.7 FL
POTASSIUM SERPL-SCNC: 4.2 MMOL/L
PROT SERPL-MCNC: 6.5 G/DL
RBC # BLD: 3.92 M/UL
RBC # FLD: 23.7 %
SODIUM SERPL-SCNC: 136 MMOL/L
WBC # FLD AUTO: 3.4 K/UL

## 2025-02-18 PROCEDURE — 85025 COMPLETE CBC W/AUTO DIFF WBC: CPT

## 2025-02-18 PROCEDURE — 99214 OFFICE O/P EST MOD 30 MIN: CPT

## 2025-02-18 PROCEDURE — 96416 CHEMO PROLONG INFUSE W/PUMP: CPT

## 2025-02-18 PROCEDURE — 96413 CHEMO IV INFUSION 1 HR: CPT

## 2025-02-18 PROCEDURE — 36415 COLL VENOUS BLD VENIPUNCTURE: CPT

## 2025-02-18 PROCEDURE — 96411 CHEMO IV PUSH ADDL DRUG: CPT

## 2025-02-18 PROCEDURE — 96415 CHEMO IV INFUSION ADDL HR: CPT

## 2025-02-18 PROCEDURE — 96367 TX/PROPH/DG ADDL SEQ IV INF: CPT

## 2025-02-18 PROCEDURE — 80053 COMPREHEN METABOLIC PANEL: CPT

## 2025-02-18 PROCEDURE — 96375 TX/PRO/DX INJ NEW DRUG ADDON: CPT

## 2025-02-18 PROCEDURE — 82378 CARCINOEMBRYONIC ANTIGEN: CPT

## 2025-02-18 RX ORDER — FOSAPREPITANT 150 MG/5ML
150 INJECTION, POWDER, LYOPHILIZED, FOR SOLUTION INTRAVENOUS ONCE
Refills: 0 | Status: COMPLETED | OUTPATIENT
Start: 2025-02-18 | End: 2025-02-18

## 2025-02-18 RX ORDER — DEXAMETHASONE 0.5 MG/1
12 TABLET ORAL ONCE
Refills: 0 | Status: COMPLETED | OUTPATIENT
Start: 2025-02-18 | End: 2025-02-18

## 2025-02-18 RX ORDER — OXALIPLATIN 5 MG/ML
100 INJECTION, SOLUTION, CONCENTRATE INTRAVENOUS ONCE
Refills: 0 | Status: COMPLETED | OUTPATIENT
Start: 2025-02-18 | End: 2025-02-18

## 2025-02-18 RX ORDER — LEUCOVORIN CALCIUM 50 MG/5ML
360 INJECTION, POWDER, LYOPHILIZED, FOR SOLUTION INTRAMUSCULAR; INTRAVENOUS ONCE
Refills: 0 | Status: COMPLETED | OUTPATIENT
Start: 2025-02-18 | End: 2025-02-18

## 2025-02-18 RX ORDER — FLUOROURACIL 50 MG/ML
2880 INJECTION, SOLUTION INTRAVENOUS ONCE
Refills: 0 | Status: COMPLETED | OUTPATIENT
Start: 2025-02-18 | End: 2025-02-18

## 2025-02-18 RX ORDER — FLUOROURACIL 50 MG/ML
360 INJECTION, SOLUTION INTRAVENOUS ONCE
Refills: 0 | Status: COMPLETED | OUTPATIENT
Start: 2025-02-18 | End: 2025-02-18

## 2025-02-18 RX ORDER — ONDANSETRON HCL/PF 4 MG/2 ML
16 VIAL (ML) INJECTION ONCE
Refills: 0 | Status: COMPLETED | OUTPATIENT
Start: 2025-02-18 | End: 2025-02-18

## 2025-02-18 RX ADMIN — OXALIPLATIN 100 MILLIGRAM(S): 5 INJECTION, SOLUTION, CONCENTRATE INTRAVENOUS at 14:40

## 2025-02-18 RX ADMIN — DEXAMETHASONE 12 MILLIGRAM(S): 0.5 TABLET ORAL at 12:40

## 2025-02-18 RX ADMIN — LEUCOVORIN CALCIUM 360 MILLIGRAM(S): 50 INJECTION, POWDER, LYOPHILIZED, FOR SOLUTION INTRAMUSCULAR; INTRAVENOUS at 14:40

## 2025-02-18 RX ADMIN — FOSAPREPITANT 500 MILLIGRAM(S): 150 INJECTION, POWDER, LYOPHILIZED, FOR SOLUTION INTRAVENOUS at 11:40

## 2025-02-18 RX ADMIN — OXALIPLATIN 100 MILLIGRAM(S): 5 INJECTION, SOLUTION, CONCENTRATE INTRAVENOUS at 12:40

## 2025-02-18 RX ADMIN — FLUOROURACIL 2880 MILLIGRAM(S): 50 INJECTION, SOLUTION INTRAVENOUS at 14:50

## 2025-02-18 RX ADMIN — LEUCOVORIN CALCIUM 360 MILLIGRAM(S): 50 INJECTION, POWDER, LYOPHILIZED, FOR SOLUTION INTRAMUSCULAR; INTRAVENOUS at 12:40

## 2025-02-18 RX ADMIN — FLUOROURACIL 86.4 MILLIGRAM(S): 50 INJECTION, SOLUTION INTRAVENOUS at 14:45

## 2025-02-18 RX ADMIN — DEXAMETHASONE 100 MILLIGRAM(S): 0.5 TABLET ORAL at 12:25

## 2025-02-18 RX ADMIN — Medication 16 MILLIGRAM(S): at 12:25

## 2025-02-18 RX ADMIN — FOSAPREPITANT 150 MILLIGRAM(S): 150 INJECTION, POWDER, LYOPHILIZED, FOR SOLUTION INTRAVENOUS at 12:10

## 2025-02-18 RX ADMIN — Medication 100 MILLIGRAM(S): at 12:10

## 2025-02-19 DIAGNOSIS — C18.9 MALIGNANT NEOPLASM OF COLON, UNSPECIFIED: ICD-10-CM

## 2025-02-19 LAB — CEA SERPL-MCNC: 3.4 NG/ML

## 2025-02-20 ENCOUNTER — OUTPATIENT (OUTPATIENT)
Dept: OUTPATIENT SERVICES | Facility: HOSPITAL | Age: 80
LOS: 1 days | End: 2025-02-20
Payer: MEDICAID

## 2025-02-20 ENCOUNTER — APPOINTMENT (OUTPATIENT)
Age: 80
End: 2025-02-20

## 2025-02-20 DIAGNOSIS — C18.9 MALIGNANT NEOPLASM OF COLON, UNSPECIFIED: ICD-10-CM

## 2025-02-20 PROCEDURE — 99211 OFF/OP EST MAY X REQ PHY/QHP: CPT

## 2025-03-04 ENCOUNTER — APPOINTMENT (OUTPATIENT)
Age: 80
End: 2025-03-04
Payer: MEDICAID

## 2025-03-04 ENCOUNTER — OUTPATIENT (OUTPATIENT)
Dept: OUTPATIENT SERVICES | Facility: HOSPITAL | Age: 80
LOS: 1 days | End: 2025-03-04
Payer: MEDICAID

## 2025-03-04 VITALS
TEMPERATURE: 97.7 F | HEIGHT: 61 IN | WEIGHT: 125 LBS | SYSTOLIC BLOOD PRESSURE: 151 MMHG | DIASTOLIC BLOOD PRESSURE: 71 MMHG | OXYGEN SATURATION: 98 % | RESPIRATION RATE: 16 BRPM | BODY MASS INDEX: 23.6 KG/M2 | HEART RATE: 74 BPM

## 2025-03-04 DIAGNOSIS — C18.9 MALIGNANT NEOPLASM OF COLON, UNSPECIFIED: ICD-10-CM

## 2025-03-04 DIAGNOSIS — Z51.11 ENCOUNTER FOR ANTINEOPLASTIC CHEMOTHERAPY: ICD-10-CM

## 2025-03-04 LAB
ALBUMIN SERPL ELPH-MCNC: 4 G/DL
ALP BLD-CCNC: 80 U/L
ALT SERPL-CCNC: 8 U/L
ANION GAP SERPL CALC-SCNC: 12 MMOL/L
AST SERPL-CCNC: 17 U/L
AUTO BASOPHILS #: 0 K/UL
AUTO BASOPHILS %: 0 %
AUTO EOSINOPHILS #: 0 K/UL
AUTO EOSINOPHILS %: 0 %
AUTO IMMATURE GRANULOCYTES #: 0.01 K/UL
AUTO LYMPHOCYTES #: 1.02 K/UL
AUTO LYMPHOCYTES %: 25.1 %
AUTO MONOCYTES #: 0.64 K/UL
AUTO MONOCYTES %: 15.8 %
AUTO NEUTROPHILS #: 2.39 K/UL
AUTO NEUTROPHILS %: 58.9 %
AUTO NRBC #: 0 K/UL
BILIRUB SERPL-MCNC: 0.2 MG/DL
BUN SERPL-MCNC: 34 MG/DL
CALCIUM SERPL-MCNC: 8.9 MG/DL
CHLORIDE SERPL-SCNC: 97 MMOL/L
CO2 SERPL-SCNC: 26 MMOL/L
CREAT SERPL-MCNC: 1 MG/DL
EGFRCR SERPLBLD CKD-EPI 2021: 58 ML/MIN/1.73M2
GLUCOSE SERPL-MCNC: 124 MG/DL
HCT VFR BLD CALC: 31.1 %
HGB BLD-MCNC: 10.2 G/DL
IMM GRANULOCYTES NFR BLD AUTO: 0.2 %
MAN DIFF?: NORMAL
MCHC RBC-ENTMCNC: 29.9 PG
MCHC RBC-ENTMCNC: 32.8 G/DL
MCV RBC AUTO: 91.2 FL
PLATELET # BLD AUTO: 120 K/UL
PMV BLD AUTO: 0 /100 WBCS
PMV BLD: 9.7 FL
POTASSIUM SERPL-SCNC: 4.3 MMOL/L
PROT SERPL-MCNC: 6.1 G/DL
RBC # BLD: 3.41 M/UL
RBC # FLD: 19.4 %
SODIUM SERPL-SCNC: 135 MMOL/L
WBC # FLD AUTO: 4.06 K/UL

## 2025-03-04 PROCEDURE — G2211 COMPLEX E/M VISIT ADD ON: CPT

## 2025-03-04 PROCEDURE — 80053 COMPREHEN METABOLIC PANEL: CPT

## 2025-03-04 PROCEDURE — 96416 CHEMO PROLONG INFUSE W/PUMP: CPT

## 2025-03-04 PROCEDURE — 99213 OFFICE O/P EST LOW 20 MIN: CPT

## 2025-03-04 PROCEDURE — 96367 TX/PROPH/DG ADDL SEQ IV INF: CPT

## 2025-03-04 PROCEDURE — 96413 CHEMO IV INFUSION 1 HR: CPT

## 2025-03-04 PROCEDURE — 96411 CHEMO IV PUSH ADDL DRUG: CPT

## 2025-03-04 PROCEDURE — 85025 COMPLETE CBC W/AUTO DIFF WBC: CPT

## 2025-03-04 PROCEDURE — 82378 CARCINOEMBRYONIC ANTIGEN: CPT

## 2025-03-04 RX ORDER — LEUCOVORIN CALCIUM 50 MG/5ML
360 INJECTION, POWDER, LYOPHILIZED, FOR SOLUTION INTRAMUSCULAR; INTRAVENOUS ONCE
Refills: 0 | Status: COMPLETED | OUTPATIENT
Start: 2025-03-04 | End: 2025-03-04

## 2025-03-04 RX ORDER — ONDANSETRON HCL/PF 4 MG/2 ML
8 VIAL (ML) INJECTION ONCE
Refills: 0 | Status: COMPLETED | OUTPATIENT
Start: 2025-03-04 | End: 2025-03-04

## 2025-03-04 RX ORDER — FLUOROURACIL 50 MG/ML
2880 INJECTION, SOLUTION INTRAVENOUS ONCE
Refills: 0 | Status: COMPLETED | OUTPATIENT
Start: 2025-03-04 | End: 2025-03-04

## 2025-03-04 RX ORDER — FLUOROURACIL 50 MG/ML
360 INJECTION, SOLUTION INTRAVENOUS ONCE
Refills: 0 | Status: COMPLETED | OUTPATIENT
Start: 2025-03-04 | End: 2025-03-04

## 2025-03-04 RX ORDER — DEXAMETHASONE 0.5 MG/1
8 TABLET ORAL ONCE
Refills: 0 | Status: COMPLETED | OUTPATIENT
Start: 2025-03-04 | End: 2025-03-04

## 2025-03-04 RX ADMIN — DEXAMETHASONE 8 MILLIGRAM(S): 0.5 TABLET ORAL at 12:03

## 2025-03-04 RX ADMIN — Medication 100 MILLIGRAM(S): at 11:42

## 2025-03-04 RX ADMIN — FLUOROURACIL 2880 MILLIGRAM(S): 50 INJECTION, SOLUTION INTRAVENOUS at 12:14

## 2025-03-04 RX ADMIN — FLUOROURACIL 86.4 MILLIGRAM(S): 50 INJECTION, SOLUTION INTRAVENOUS at 12:15

## 2025-03-04 RX ADMIN — DEXAMETHASONE 100 MILLIGRAM(S): 0.5 TABLET ORAL at 11:42

## 2025-03-04 RX ADMIN — LEUCOVORIN CALCIUM 360 MILLIGRAM(S): 50 INJECTION, POWDER, LYOPHILIZED, FOR SOLUTION INTRAMUSCULAR; INTRAVENOUS at 12:15

## 2025-03-04 RX ADMIN — Medication 8 MILLIGRAM(S): at 12:23

## 2025-03-05 DIAGNOSIS — C18.9 MALIGNANT NEOPLASM OF COLON, UNSPECIFIED: ICD-10-CM

## 2025-03-05 LAB — CEA SERPL-MCNC: 3 NG/ML

## 2025-03-06 ENCOUNTER — APPOINTMENT (OUTPATIENT)
Age: 80
End: 2025-03-06

## 2025-03-06 ENCOUNTER — OUTPATIENT (OUTPATIENT)
Dept: OUTPATIENT SERVICES | Facility: HOSPITAL | Age: 80
LOS: 1 days | End: 2025-03-06
Payer: MEDICAID

## 2025-03-06 VITALS — HEART RATE: 67 BPM | TEMPERATURE: 98 F | SYSTOLIC BLOOD PRESSURE: 164 MMHG | DIASTOLIC BLOOD PRESSURE: 78 MMHG

## 2025-03-06 DIAGNOSIS — C18.9 MALIGNANT NEOPLASM OF COLON, UNSPECIFIED: ICD-10-CM

## 2025-03-06 PROCEDURE — 99211 OFF/OP EST MAY X REQ PHY/QHP: CPT

## 2025-03-13 NOTE — PHYSICAL THERAPY INITIAL EVALUATION ADULT - BALANCE TRAINING, PT EVAL
EMERGENCY DEPARTMENT ENCOUNTER    Pt Name: Sheree Lovell  MRN: [id number]  Birthdate 2008  Date of evaluation: 3/13/25  CHIEF COMPLAINT       Chief Complaint   Patient presents with    Abdominal Pain     Px arrives complaining of RUQ pain that initiated this am. Px denies V/D but states nausea is present    Chest Pain     Px also complains of L sided chest pain that initiated this am.      HISTORY OF PRESENT ILLNESS   16-year-old female presents emergency room for right upper quadrant abdominal pain that started earlier today.  Patient reports pain has been fairly constant.  She reports decreased appetite but has not had any vomiting or diarrhea.  Patient has no major medical problems.  She reports normal bowel movements.  No concern for pregnancy             REVIEW OF SYSTEMS     Review of Systems   Gastrointestinal:  Positive for abdominal pain.     PASTMEDICAL HISTORY     Past Medical History:   Diagnosis Date    Abscess and cellulitis 12/25/11    Left Labia    High blood pressure 11/13/2013    Jaundice 2010    Laceration 12/8/12    3rd and 4th digits of left hand     Past Problem List  Patient Active Problem List   Diagnosis Code    Abscess and cellulitis L03.90, L02.91    Scabies B86    Laceration [id number]    High blood pressure I10    Canker sore K12.0    Need for prophylactic vaccination against diphtheria-tetanus-pertussis (DTP) Z23    Meningococcal vaccination administered at current visit Z23     SURGICAL HISTORY       Past Surgical History:   Procedure Laterality Date    ABSCESS DRAINAGE  12/26/11    Left Labia     CURRENT MEDICATIONS       Discharge Medication List as of 3/13/2025  2:32 AM        CONTINUE these medications which have NOT CHANGED    Details   albuterol sulfate HFA (VENTOLIN HFA) 108 (90 Base) MCG/ACT inhaler Inhale 2 puffs into the lungs 4 times daily as needed for Wheezing, Disp-6.7 g, R-1Normal           ALLERGIES     has no known allergies.  FAMILY HISTORY     She indicated that her  Inc stand balance s/d to G/F w/ RW by DC.

## 2025-03-18 ENCOUNTER — APPOINTMENT (OUTPATIENT)
Age: 80
End: 2025-03-18

## 2025-03-18 ENCOUNTER — OUTPATIENT (OUTPATIENT)
Dept: OUTPATIENT SERVICES | Facility: HOSPITAL | Age: 80
LOS: 1 days | End: 2025-03-18
Payer: MEDICAID

## 2025-03-18 DIAGNOSIS — C18.9 MALIGNANT NEOPLASM OF COLON, UNSPECIFIED: ICD-10-CM

## 2025-03-18 LAB
ALBUMIN SERPL ELPH-MCNC: 3.8 G/DL
ALP BLD-CCNC: 76 U/L
ALT SERPL-CCNC: 8 U/L
ANION GAP SERPL CALC-SCNC: 13 MMOL/L
AST SERPL-CCNC: 16 U/L
AUTO BASOPHILS #: 0 K/UL
AUTO BASOPHILS %: 0 %
AUTO EOSINOPHILS #: 0 K/UL
AUTO EOSINOPHILS %: 0 %
AUTO IMMATURE GRANULOCYTES #: 0.04 K/UL
AUTO LYMPHOCYTES #: 1.15 K/UL
AUTO LYMPHOCYTES %: 29.9 %
AUTO MONOCYTES #: 0.58 K/UL
AUTO MONOCYTES %: 15.1 %
AUTO NEUTROPHILS #: 2.08 K/UL
AUTO NEUTROPHILS %: 54 %
AUTO NRBC #: 0 K/UL
BILIRUB SERPL-MCNC: 0.4 MG/DL
BUN SERPL-MCNC: 28 MG/DL
CALCIUM SERPL-MCNC: 9.1 MG/DL
CHLORIDE SERPL-SCNC: 100 MMOL/L
CO2 SERPL-SCNC: 22 MMOL/L
CREAT SERPL-MCNC: 0.9 MG/DL
EGFRCR SERPLBLD CKD-EPI 2021: 65 ML/MIN/1.73M2
GLUCOSE SERPL-MCNC: 112 MG/DL
HCT VFR BLD CALC: 32 %
HGB BLD-MCNC: 10.8 G/DL
IMM GRANULOCYTES NFR BLD AUTO: 1 %
IMMATURE PLATELET FRACTION #: 1.9 K/UL
IMMATURE PLATELET FRACTION %: 1.8 %
MAN DIFF?: NORMAL
MCHC RBC-ENTMCNC: 30.8 PG
MCHC RBC-ENTMCNC: 33.8 G/DL
MCV RBC AUTO: 91.2 FL
PLATELET # BLD AUTO: 107 K/UL
PMV BLD AUTO: 0 /100 WBCS
PMV BLD: 9 FL
POTASSIUM SERPL-SCNC: 4.1 MMOL/L
PROT SERPL-MCNC: 6.4 G/DL
RBC # BLD: 3.51 M/UL
RBC # FLD: 17.4 %
SODIUM SERPL-SCNC: 135 MMOL/L
WBC # FLD AUTO: 3.85 K/UL

## 2025-03-18 PROCEDURE — 85025 COMPLETE CBC W/AUTO DIFF WBC: CPT

## 2025-03-18 PROCEDURE — 96413 CHEMO IV INFUSION 1 HR: CPT

## 2025-03-18 PROCEDURE — 80053 COMPREHEN METABOLIC PANEL: CPT

## 2025-03-18 PROCEDURE — 96416 CHEMO PROLONG INFUSE W/PUMP: CPT

## 2025-03-18 PROCEDURE — 96411 CHEMO IV PUSH ADDL DRUG: CPT

## 2025-03-18 PROCEDURE — 96375 TX/PRO/DX INJ NEW DRUG ADDON: CPT

## 2025-03-18 RX ORDER — FLUOROURACIL 50 MG/ML
360 INJECTION, SOLUTION INTRAVENOUS ONCE
Refills: 0 | Status: COMPLETED | OUTPATIENT
Start: 2025-03-18 | End: 2025-03-18

## 2025-03-18 RX ORDER — LEUCOVORIN CALCIUM 50 MG/5ML
360 INJECTION, POWDER, LYOPHILIZED, FOR SOLUTION INTRAMUSCULAR; INTRAVENOUS ONCE
Refills: 0 | Status: COMPLETED | OUTPATIENT
Start: 2025-03-18 | End: 2025-03-18

## 2025-03-18 RX ORDER — FLUOROURACIL 50 MG/ML
2880 INJECTION, SOLUTION INTRAVENOUS ONCE
Refills: 0 | Status: COMPLETED | OUTPATIENT
Start: 2025-03-18 | End: 2025-03-18

## 2025-03-18 RX ORDER — DEXAMETHASONE 0.5 MG/1
8 TABLET ORAL ONCE
Refills: 0 | Status: COMPLETED | OUTPATIENT
Start: 2025-03-18 | End: 2025-03-18

## 2025-03-18 RX ORDER — ONDANSETRON HCL/PF 4 MG/2 ML
8 VIAL (ML) INJECTION ONCE
Refills: 0 | Status: COMPLETED | OUTPATIENT
Start: 2025-03-18 | End: 2025-03-18

## 2025-03-18 RX ADMIN — LEUCOVORIN CALCIUM 360 MILLIGRAM(S): 50 INJECTION, POWDER, LYOPHILIZED, FOR SOLUTION INTRAMUSCULAR; INTRAVENOUS at 10:20

## 2025-03-18 RX ADMIN — LEUCOVORIN CALCIUM 360 MILLIGRAM(S): 50 INJECTION, POWDER, LYOPHILIZED, FOR SOLUTION INTRAMUSCULAR; INTRAVENOUS at 10:30

## 2025-03-18 RX ADMIN — DEXAMETHASONE 100 MILLIGRAM(S): 0.5 TABLET ORAL at 09:36

## 2025-03-18 RX ADMIN — FLUOROURACIL 2880 MILLIGRAM(S): 50 INJECTION, SOLUTION INTRAVENOUS at 11:27

## 2025-03-18 RX ADMIN — DEXAMETHASONE 8 MILLIGRAM(S): 0.5 TABLET ORAL at 10:05

## 2025-03-18 RX ADMIN — Medication 100 MILLIGRAM(S): at 09:37

## 2025-03-18 RX ADMIN — FLUOROURACIL 86.4 MILLIGRAM(S): 50 INJECTION, SOLUTION INTRAVENOUS at 11:30

## 2025-03-18 RX ADMIN — Medication 8 MILLIGRAM(S): at 09:55

## 2025-03-20 ENCOUNTER — APPOINTMENT (OUTPATIENT)
Age: 80
End: 2025-03-20

## 2025-03-20 ENCOUNTER — OUTPATIENT (OUTPATIENT)
Dept: OUTPATIENT SERVICES | Facility: HOSPITAL | Age: 80
LOS: 1 days | End: 2025-03-20
Payer: MEDICAID

## 2025-03-20 DIAGNOSIS — C18.9 MALIGNANT NEOPLASM OF COLON, UNSPECIFIED: ICD-10-CM

## 2025-03-20 PROCEDURE — 99211 OFF/OP EST MAY X REQ PHY/QHP: CPT

## 2025-04-01 ENCOUNTER — OUTPATIENT (OUTPATIENT)
Dept: OUTPATIENT SERVICES | Facility: HOSPITAL | Age: 80
LOS: 1 days | End: 2025-04-01
Payer: MEDICAID

## 2025-04-01 ENCOUNTER — APPOINTMENT (OUTPATIENT)
Age: 80
End: 2025-04-01
Payer: MEDICAID

## 2025-04-01 VITALS
SYSTOLIC BLOOD PRESSURE: 142 MMHG | TEMPERATURE: 97.5 F | OXYGEN SATURATION: 98 % | RESPIRATION RATE: 16 BRPM | BODY MASS INDEX: 24.17 KG/M2 | HEART RATE: 72 BPM | DIASTOLIC BLOOD PRESSURE: 69 MMHG | WEIGHT: 128 LBS | HEIGHT: 61 IN

## 2025-04-01 DIAGNOSIS — Z51.11 ENCOUNTER FOR ANTINEOPLASTIC CHEMOTHERAPY: ICD-10-CM

## 2025-04-01 DIAGNOSIS — C18.9 MALIGNANT NEOPLASM OF COLON, UNSPECIFIED: ICD-10-CM

## 2025-04-01 DIAGNOSIS — C79.60 MALIGNANT NEOPLASM OF COLON, UNSPECIFIED: ICD-10-CM

## 2025-04-01 LAB
ALBUMIN SERPL ELPH-MCNC: 4.2 G/DL
ALP BLD-CCNC: 91 U/L
ALT SERPL-CCNC: 7 U/L
ANION GAP SERPL CALC-SCNC: 13 MMOL/L
AST SERPL-CCNC: 17 U/L
AUTO BASOPHILS #: 0.01 K/UL
AUTO BASOPHILS %: 0.3 %
AUTO EOSINOPHILS #: 0 K/UL
AUTO EOSINOPHILS %: 0 %
AUTO IMMATURE GRANULOCYTES #: 0.04 K/UL
AUTO LYMPHOCYTES #: 1.11 K/UL
AUTO LYMPHOCYTES %: 28.2 %
AUTO MONOCYTES #: 0.59 K/UL
AUTO MONOCYTES %: 15 %
AUTO NEUTROPHILS #: 2.18 K/UL
AUTO NEUTROPHILS %: 55.5 %
AUTO NRBC #: 0 K/UL
BILIRUB SERPL-MCNC: 0.4 MG/DL
BUN SERPL-MCNC: 27 MG/DL
CALCIUM SERPL-MCNC: 9.6 MG/DL
CHLORIDE SERPL-SCNC: 101 MMOL/L
CO2 SERPL-SCNC: 23 MMOL/L
CREAT SERPL-MCNC: 1 MG/DL
EGFRCR SERPLBLD CKD-EPI 2021: 58 ML/MIN/1.73M2
GLUCOSE SERPL-MCNC: 97 MG/DL
HCT VFR BLD CALC: 34.6 %
HGB BLD-MCNC: 11.7 G/DL
IMM GRANULOCYTES NFR BLD AUTO: 1 %
MAN DIFF?: NORMAL
MCHC RBC-ENTMCNC: 31 PG
MCHC RBC-ENTMCNC: 33.8 G/DL
MCV RBC AUTO: 91.5 FL
PLATELET # BLD AUTO: 108 K/UL
PMV BLD AUTO: 0 /100 WBCS
PMV BLD: 11 FL
POTASSIUM SERPL-SCNC: 4.6 MMOL/L
PROT SERPL-MCNC: 6.7 G/DL
RBC # BLD: 3.78 M/UL
RBC # FLD: 16.1 %
SODIUM SERPL-SCNC: 137 MMOL/L
WBC # FLD AUTO: 3.93 K/UL

## 2025-04-01 PROCEDURE — 96413 CHEMO IV INFUSION 1 HR: CPT

## 2025-04-01 PROCEDURE — 96375 TX/PRO/DX INJ NEW DRUG ADDON: CPT

## 2025-04-01 PROCEDURE — 99213 OFFICE O/P EST LOW 20 MIN: CPT

## 2025-04-01 PROCEDURE — 85025 COMPLETE CBC W/AUTO DIFF WBC: CPT

## 2025-04-01 PROCEDURE — 80053 COMPREHEN METABOLIC PANEL: CPT

## 2025-04-01 PROCEDURE — 99213 OFFICE O/P EST LOW 20 MIN: CPT | Mod: 25

## 2025-04-01 PROCEDURE — 96411 CHEMO IV PUSH ADDL DRUG: CPT

## 2025-04-01 PROCEDURE — 82378 CARCINOEMBRYONIC ANTIGEN: CPT

## 2025-04-01 PROCEDURE — 96416 CHEMO PROLONG INFUSE W/PUMP: CPT

## 2025-04-01 PROCEDURE — 36415 COLL VENOUS BLD VENIPUNCTURE: CPT

## 2025-04-01 RX ORDER — LEUCOVORIN CALCIUM 50 MG/5ML
360 INJECTION, POWDER, LYOPHILIZED, FOR SOLUTION INTRAMUSCULAR; INTRAVENOUS ONCE
Refills: 0 | Status: COMPLETED | OUTPATIENT
Start: 2025-04-01 | End: 2025-04-01

## 2025-04-01 RX ORDER — FLUOROURACIL 50 MG/ML
2880 INJECTION, SOLUTION INTRAVENOUS ONCE
Refills: 0 | Status: COMPLETED | OUTPATIENT
Start: 2025-04-01 | End: 2025-04-01

## 2025-04-01 RX ORDER — FLUOROURACIL 50 MG/ML
360 INJECTION, SOLUTION INTRAVENOUS ONCE
Refills: 0 | Status: COMPLETED | OUTPATIENT
Start: 2025-04-01 | End: 2025-04-01

## 2025-04-01 RX ORDER — ONDANSETRON HCL/PF 4 MG/2 ML
8 VIAL (ML) INJECTION ONCE
Refills: 0 | Status: COMPLETED | OUTPATIENT
Start: 2025-04-01 | End: 2025-04-01

## 2025-04-01 RX ORDER — DEXAMETHASONE 0.5 MG/1
8 TABLET ORAL ONCE
Refills: 0 | Status: COMPLETED | OUTPATIENT
Start: 2025-04-01 | End: 2025-04-01

## 2025-04-01 RX ADMIN — FLUOROURACIL 2880 MILLIGRAM(S): 50 INJECTION, SOLUTION INTRAVENOUS at 14:10

## 2025-04-01 RX ADMIN — DEXAMETHASONE 8 MILLIGRAM(S): 0.5 TABLET ORAL at 12:55

## 2025-04-01 RX ADMIN — LEUCOVORIN CALCIUM 360 MILLIGRAM(S): 50 INJECTION, POWDER, LYOPHILIZED, FOR SOLUTION INTRAMUSCULAR; INTRAVENOUS at 12:55

## 2025-04-01 RX ADMIN — FLUOROURACIL 86.4 MILLIGRAM(S): 50 INJECTION, SOLUTION INTRAVENOUS at 14:00

## 2025-04-01 RX ADMIN — Medication 8 MILLIGRAM(S): at 12:40

## 2025-04-01 RX ADMIN — DEXAMETHASONE 100 MILLIGRAM(S): 0.5 TABLET ORAL at 12:40

## 2025-04-01 RX ADMIN — Medication 100 MILLIGRAM(S): at 12:23

## 2025-04-01 RX ADMIN — LEUCOVORIN CALCIUM 360 MILLIGRAM(S): 50 INJECTION, POWDER, LYOPHILIZED, FOR SOLUTION INTRAMUSCULAR; INTRAVENOUS at 13:55

## 2025-04-02 DIAGNOSIS — C18.9 MALIGNANT NEOPLASM OF COLON, UNSPECIFIED: ICD-10-CM

## 2025-04-02 LAB — CEA SERPL-MCNC: 3.7 NG/ML

## 2025-04-02 NOTE — ED PROVIDER NOTE - DISCUSSED CASE WITH MULTISELECT OPTIONS
Health Maintenance       COVID-19 Vaccine (3 - 2024-25 season)  Overdue since 9/1/2024    Depression Screening (Yearly)  Due since 3/29/2025           Following review of the above:  Patient is not proceeding with: COVID-19 of depression screening     Note: Refer to final orders and clinician documentation.       Consultant

## 2025-04-03 ENCOUNTER — APPOINTMENT (OUTPATIENT)
Age: 80
End: 2025-04-03

## 2025-04-03 ENCOUNTER — OUTPATIENT (OUTPATIENT)
Dept: OUTPATIENT SERVICES | Facility: HOSPITAL | Age: 80
LOS: 1 days | End: 2025-04-03
Payer: MEDICAID

## 2025-04-03 DIAGNOSIS — C18.9 MALIGNANT NEOPLASM OF COLON, UNSPECIFIED: ICD-10-CM

## 2025-04-03 PROCEDURE — 99211 OFF/OP EST MAY X REQ PHY/QHP: CPT

## 2025-04-22 ENCOUNTER — RESULT REVIEW (OUTPATIENT)
Age: 80
End: 2025-04-22

## 2025-04-22 ENCOUNTER — OUTPATIENT (OUTPATIENT)
Dept: OUTPATIENT SERVICES | Facility: HOSPITAL | Age: 80
LOS: 1 days | End: 2025-04-22
Payer: MEDICAID

## 2025-04-22 DIAGNOSIS — C18.9 MALIGNANT NEOPLASM OF COLON, UNSPECIFIED: ICD-10-CM

## 2025-04-22 LAB — GLUCOSE BLDC GLUCOMTR-MCNC: 112 MG/DL — HIGH (ref 70–99)

## 2025-04-22 PROCEDURE — 78815 PET IMAGE W/CT SKULL-THIGH: CPT | Mod: 26,PS

## 2025-04-22 PROCEDURE — 78815 PET IMAGE W/CT SKULL-THIGH: CPT | Mod: PS

## 2025-04-22 PROCEDURE — 82962 GLUCOSE BLOOD TEST: CPT

## 2025-04-22 PROCEDURE — A9552: CPT

## 2025-04-23 DIAGNOSIS — C18.9 MALIGNANT NEOPLASM OF COLON, UNSPECIFIED: ICD-10-CM

## 2025-04-29 ENCOUNTER — OUTPATIENT (OUTPATIENT)
Dept: OUTPATIENT SERVICES | Facility: HOSPITAL | Age: 80
LOS: 1 days | End: 2025-04-29
Payer: MEDICAID

## 2025-04-29 ENCOUNTER — APPOINTMENT (OUTPATIENT)
Age: 80
End: 2025-04-29
Payer: MEDICAID

## 2025-04-29 VITALS
OXYGEN SATURATION: 97 % | SYSTOLIC BLOOD PRESSURE: 160 MMHG | RESPIRATION RATE: 16 BRPM | DIASTOLIC BLOOD PRESSURE: 79 MMHG | HEART RATE: 87 BPM | HEIGHT: 61 IN | TEMPERATURE: 97.9 F | BODY MASS INDEX: 23.6 KG/M2 | WEIGHT: 125 LBS

## 2025-04-29 DIAGNOSIS — C18.9 MALIGNANT NEOPLASM OF COLON, UNSPECIFIED: ICD-10-CM

## 2025-04-29 PROCEDURE — 99214 OFFICE O/P EST MOD 30 MIN: CPT

## 2025-05-09 ENCOUNTER — OUTPATIENT (OUTPATIENT)
Dept: OUTPATIENT SERVICES | Facility: HOSPITAL | Age: 80
LOS: 1 days | End: 2025-05-09
Payer: MEDICAID

## 2025-05-09 VITALS
SYSTOLIC BLOOD PRESSURE: 210 MMHG | OXYGEN SATURATION: 98 % | TEMPERATURE: 98 F | WEIGHT: 121.92 LBS | DIASTOLIC BLOOD PRESSURE: 95 MMHG | RESPIRATION RATE: 16 BRPM | HEART RATE: 67 BPM | HEIGHT: 60 IN

## 2025-05-09 DIAGNOSIS — Z01.818 ENCOUNTER FOR OTHER PREPROCEDURAL EXAMINATION: ICD-10-CM

## 2025-05-09 DIAGNOSIS — Z90.710 ACQUIRED ABSENCE OF BOTH CERVIX AND UTERUS: Chronic | ICD-10-CM

## 2025-05-09 DIAGNOSIS — C18.9 MALIGNANT NEOPLASM OF COLON, UNSPECIFIED: ICD-10-CM

## 2025-05-09 DIAGNOSIS — Z90.49 ACQUIRED ABSENCE OF OTHER SPECIFIED PARTS OF DIGESTIVE TRACT: Chronic | ICD-10-CM

## 2025-05-09 LAB
ALBUMIN SERPL ELPH-MCNC: 4.2 G/DL — SIGNIFICANT CHANGE UP (ref 3.5–5.2)
ALP SERPL-CCNC: 77 U/L — SIGNIFICANT CHANGE UP (ref 30–115)
ALT FLD-CCNC: 9 U/L — SIGNIFICANT CHANGE UP (ref 0–41)
ANION GAP SERPL CALC-SCNC: 14 MMOL/L — SIGNIFICANT CHANGE UP (ref 7–14)
APTT BLD: 32.4 SEC — SIGNIFICANT CHANGE UP (ref 27–39.2)
AST SERPL-CCNC: 19 U/L — SIGNIFICANT CHANGE UP (ref 0–41)
BASOPHILS # BLD AUTO: 0 K/UL — SIGNIFICANT CHANGE UP (ref 0–0.2)
BASOPHILS NFR BLD AUTO: 0 % — SIGNIFICANT CHANGE UP (ref 0–1)
BILIRUB SERPL-MCNC: 0.3 MG/DL — SIGNIFICANT CHANGE UP (ref 0.2–1.2)
BUN SERPL-MCNC: 25 MG/DL — HIGH (ref 10–20)
CALCIUM SERPL-MCNC: 9.5 MG/DL — SIGNIFICANT CHANGE UP (ref 8.4–10.5)
CHLORIDE SERPL-SCNC: 98 MMOL/L — SIGNIFICANT CHANGE UP (ref 98–110)
CO2 SERPL-SCNC: 24 MMOL/L — SIGNIFICANT CHANGE UP (ref 17–32)
CREAT SERPL-MCNC: 0.9 MG/DL — SIGNIFICANT CHANGE UP (ref 0.7–1.5)
EGFR: 65 ML/MIN/1.73M2 — SIGNIFICANT CHANGE UP
EGFR: 65 ML/MIN/1.73M2 — SIGNIFICANT CHANGE UP
EOSINOPHIL # BLD AUTO: 0 K/UL — SIGNIFICANT CHANGE UP (ref 0–0.7)
EOSINOPHIL NFR BLD AUTO: 0 % — SIGNIFICANT CHANGE UP (ref 0–8)
GLUCOSE SERPL-MCNC: 91 MG/DL — SIGNIFICANT CHANGE UP (ref 70–99)
HCT VFR BLD CALC: 33 % — LOW (ref 37–47)
HGB BLD-MCNC: 11.2 G/DL — LOW (ref 12–16)
IMM GRANULOCYTES NFR BLD AUTO: 0.2 % — SIGNIFICANT CHANGE UP (ref 0.1–0.3)
INR BLD: 0.95 RATIO — SIGNIFICANT CHANGE UP (ref 0.65–1.3)
LYMPHOCYTES # BLD AUTO: 0.89 K/UL — LOW (ref 1.2–3.4)
LYMPHOCYTES # BLD AUTO: 21.5 % — SIGNIFICANT CHANGE UP (ref 20.5–51.1)
MCHC RBC-ENTMCNC: 31.3 PG — HIGH (ref 27–31)
MCHC RBC-ENTMCNC: 33.9 G/DL — SIGNIFICANT CHANGE UP (ref 32–37)
MCV RBC AUTO: 92.2 FL — SIGNIFICANT CHANGE UP (ref 81–99)
MONOCYTES # BLD AUTO: 0.45 K/UL — SIGNIFICANT CHANGE UP (ref 0.1–0.6)
MONOCYTES NFR BLD AUTO: 10.9 % — HIGH (ref 1.7–9.3)
NEUTROPHILS # BLD AUTO: 2.78 K/UL — SIGNIFICANT CHANGE UP (ref 1.4–6.5)
NEUTROPHILS NFR BLD AUTO: 67.4 % — SIGNIFICANT CHANGE UP (ref 42.2–75.2)
NRBC BLD AUTO-RTO: 0 /100 WBCS — SIGNIFICANT CHANGE UP (ref 0–0)
PLATELET # BLD AUTO: 147 K/UL — SIGNIFICANT CHANGE UP (ref 130–400)
PMV BLD: 10.8 FL — HIGH (ref 7.4–10.4)
POTASSIUM SERPL-MCNC: 4.5 MMOL/L — SIGNIFICANT CHANGE UP (ref 3.5–5)
POTASSIUM SERPL-SCNC: 4.5 MMOL/L — SIGNIFICANT CHANGE UP (ref 3.5–5)
PROT SERPL-MCNC: 6.7 G/DL — SIGNIFICANT CHANGE UP (ref 6–8)
PROTHROM AB SERPL-ACNC: 11.2 SEC — SIGNIFICANT CHANGE UP (ref 9.95–12.87)
RBC # BLD: 3.58 M/UL — LOW (ref 4.2–5.4)
RBC # FLD: 13.4 % — SIGNIFICANT CHANGE UP (ref 11.5–14.5)
SODIUM SERPL-SCNC: 136 MMOL/L — SIGNIFICANT CHANGE UP (ref 135–146)
WBC # BLD: 4.13 K/UL — LOW (ref 4.8–10.8)
WBC # FLD AUTO: 4.13 K/UL — LOW (ref 4.8–10.8)

## 2025-05-09 PROCEDURE — 80053 COMPREHEN METABOLIC PANEL: CPT

## 2025-05-09 PROCEDURE — 93010 ELECTROCARDIOGRAM REPORT: CPT

## 2025-05-09 PROCEDURE — 99214 OFFICE O/P EST MOD 30 MIN: CPT | Mod: 25

## 2025-05-09 PROCEDURE — 93005 ELECTROCARDIOGRAM TRACING: CPT

## 2025-05-09 PROCEDURE — 36415 COLL VENOUS BLD VENIPUNCTURE: CPT

## 2025-05-09 PROCEDURE — 85610 PROTHROMBIN TIME: CPT

## 2025-05-09 PROCEDURE — 85730 THROMBOPLASTIN TIME PARTIAL: CPT

## 2025-05-09 PROCEDURE — 85025 COMPLETE CBC W/AUTO DIFF WBC: CPT

## 2025-05-09 NOTE — H&P PST ADULT - HISTORY OF PRESENT ILLNESS
80y Female presents today for presurgical testing for Port Removal. Per Hem/Onc note dated 4/29/25 "metastatic colon cancer S/P debulking surgery followed by chemotherapy with FOLFOX X7  PET scan ( 4/2025 ) : no evidence of residual or recurrent disease  Anemia , multifactorial including iron deficiency , nearly resolved.  ?  Plan : Situation was discussed with relative , options include maintenance therapy with 5FU or xeloda. The patient is adamant about returning to [facility] and be followed locally  would repeat blood work and CT scan Chest abdomen and pelvis in 3 months  schedule PORT removal."  Patient states above is true and accurate. She denies pain and states that she feels well overall. She denies headache, dizziness or lightheadedness. She offers no other complaints at this time, now for scheduled procedure.   Of note, patient with elevated BP during visit. I’ve discussed with the patient the abnormality found during their pre-procedure evaluation.  They have been told to go to the ER for further evaluation but refuse.  I have discussed the risks including the possibility of worsening disease, pain, permanent disability, and/or death with them.  They voiced understanding of this to me.  I advised them that they can and should go immediately if they develop any worse/different/additional symptoms, or if they change their mind and want to continue their evaluation as we discussed. Per patient's cousin, patient becomes hypertensive during hospital visits. Case discussed with Anesthesia circulator who states that patient can be reevaluated on DOP.     Patient/guardian denies any CP, palpitations, SOB, cough, or dysuria. No recent URI or UTI.  Stated exercise tolerance is FOS .5  BISHOP screen reviewed    Patient/guardian denies any recent personal exposure to COVID19. Denies any sick contacts. Patient/guardian denies travel within the past 30 days. Patient was instructed to quarantine until after procedure.    Anesthesia Alert  NO--Difficult Airway - class III  NO--History of neck surgery or radiation  NO--Limited ROM of neck  NO--History of Malignant hyperthermia  NO--Personal or family history of Pseudocholinesterase deficiency.  NO--Prior Anesthesia Complication  NO--Latex Allergy  NO--Loose teeth - full upper and lower denture  NO--History of Rheumatoid Arthritis  NO--BISHOP  YES--Bleeding risk - DAILY ELIQUIS  NO--Other_____    9.95 points  The higher the score (maximum 58.2), the higher the functional status.  3.97 METs    0 points  RCRI Score  3.9 %  Risk of major cardiac event    Patient/guardian states that this is their complete medical history and list of medications.  Patient/guardian understands instructions given during this visit and was given the opportunity to ask questions and have them answered. They were instructed to follow up with their surgeon/surgeon's office with any questions regarding their procedure.

## 2025-05-09 NOTE — H&P PST ADULT - REASON FOR ADMISSION
Case Type: OP Non-block TimeSuite: Interventional RadiologyProceduralist: Servando Angulo  Confirmed Surgery DateTime: 05- - 0:00PAST DateTime: 05- - 0:00Procedure: Port Removal  ERP?: UnavailableLaterality: N/ALength of Procedure: 60 Minutes  Anesthesia Type: Local Standby

## 2025-05-09 NOTE — H&P PST ADULT - NSICDXPASTMEDICALHX_GEN_ALL_CORE_FT
PAST MEDICAL HISTORY:  Colon cancer     DVT, lower extremity     History of blood transfusion     Hypertension

## 2025-05-09 NOTE — H&P PST ADULT - NSANTHOSAYNRD_GEN_A_CORE
No. BISHOP screening performed.  STOP BANG Legend: 0-2 = LOW Risk; 3-4 = INTERMEDIATE Risk; 5-8 = HIGH Risk

## 2025-05-10 DIAGNOSIS — C18.9 MALIGNANT NEOPLASM OF COLON, UNSPECIFIED: ICD-10-CM

## 2025-05-10 DIAGNOSIS — Z01.818 ENCOUNTER FOR OTHER PREPROCEDURAL EXAMINATION: ICD-10-CM

## 2025-05-16 ENCOUNTER — RESULT REVIEW (OUTPATIENT)
Age: 80
End: 2025-05-16

## 2025-05-16 ENCOUNTER — OUTPATIENT (OUTPATIENT)
Dept: OUTPATIENT SERVICES | Facility: HOSPITAL | Age: 80
LOS: 1 days | Discharge: HOME | End: 2025-05-16
Payer: MEDICAID

## 2025-05-16 VITALS
TEMPERATURE: 97 F | HEART RATE: 69 BPM | DIASTOLIC BLOOD PRESSURE: 62 MMHG | OXYGEN SATURATION: 96 % | SYSTOLIC BLOOD PRESSURE: 128 MMHG | RESPIRATION RATE: 18 BRPM

## 2025-05-16 VITALS
RESPIRATION RATE: 18 BRPM | DIASTOLIC BLOOD PRESSURE: 78 MMHG | OXYGEN SATURATION: 99 % | SYSTOLIC BLOOD PRESSURE: 156 MMHG | TEMPERATURE: 97 F | HEIGHT: 55 IN | HEART RATE: 72 BPM | WEIGHT: 121.92 LBS

## 2025-05-16 DIAGNOSIS — C18.9 MALIGNANT NEOPLASM OF COLON, UNSPECIFIED: ICD-10-CM

## 2025-05-16 DIAGNOSIS — Z90.710 ACQUIRED ABSENCE OF BOTH CERVIX AND UTERUS: Chronic | ICD-10-CM

## 2025-05-16 DIAGNOSIS — Z90.49 ACQUIRED ABSENCE OF OTHER SPECIFIED PARTS OF DIGESTIVE TRACT: Chronic | ICD-10-CM

## 2025-05-16 PROCEDURE — 99152 MOD SED SAME PHYS/QHP 5/>YRS: CPT

## 2025-05-16 PROCEDURE — 36590 REMOVAL TUNNELED CV CATH: CPT | Mod: RT

## 2025-05-16 RX ORDER — CEFAZOLIN SODIUM IN 0.9 % NACL 3 G/100 ML
2000 INTRAVENOUS SOLUTION, PIGGYBACK (ML) INTRAVENOUS ONCE
Refills: 0 | Status: COMPLETED | OUTPATIENT
Start: 2025-05-16 | End: 2025-05-16

## 2025-05-16 RX ADMIN — Medication 100 MILLIGRAM(S): at 07:50

## 2025-05-16 NOTE — ASU PREOP CHECKLIST - BOWEL PREP
----- Message from Jessica Angel MA sent at 3/27/2017  8:54 AM CDT -----  Contact: [email or web address]      ----- Message -----     From: Raquel Fleming     Sent: 3/27/2017   8:34 AM       To: Robert FLOWER Staff    Patient states he was seen Friday for gout checkup patient states both legs has gotten worse please call patient.   n/a

## 2025-05-16 NOTE — ASU PATIENT PROFILE, ADULT - FALL HARM RISK - UNIVERSAL INTERVENTIONS
Bed in lowest position, wheels locked, appropriate side rails in place/Call bell, personal items and telephone in reach/Instruct patient to call for assistance before getting out of bed or chair/Non-slip footwear when patient is out of bed/[place] to call system/Physically safe environment - no spills, clutter or unnecessary equipment/Purposeful Proactive Rounding/Room/bathroom lighting operational, light cord in reach

## 2025-05-16 NOTE — PROGRESS NOTE ADULT - SUBJECTIVE AND OBJECTIVE BOX
INTERVENTIONAL RADIOLOGY BRIEF-OPERATIVE NOTE    Procedure: right chest port removal    Pre-Op Diagnosis:  port no longer needed    Post-Op Diagnosis: same    Attending: Josh  Resident: None    Anesthesia (type):  [ ] General Anesthesia  [ x] Sedation  [ ] Spinal Anesthesia  [x ] Local/Regional    Contrast: None    Estimated Blood Loss: Minimal    Condition:   [ ] Critical  [ ] Serious  [ ] Fair   [x ] Good    Findings/Follow up Plan of Care:  Successful removal of right chest port    Complications:  None    Disposition: Recovery then home      Please call Interventional Radiology [phone] with any questions, concerns, or issues.

## 2025-05-16 NOTE — ASU DISCHARGE PLAN (ADULT/PEDIATRIC) - FINANCIAL ASSISTANCE
[facility] provides services at a reduced cost to those who are determined to be eligible through [facility]’s financial assistance program. Information regarding [facility]’s financial assistance program can be found by going to [email or web address] or by calling [phone].

## 2025-05-16 NOTE — PRE PROCEDURE NOTE - PRE PROCEDURE EVALUATION
Vascular & Interventional Radiology Pre-Procedure Note    Procedure Name: Port removal    Allergies: No Known Allergies      Exam  General: NAD, AAO x3, no distress  Chest: breathing comfortably on room air, CTAB  Abdomen: soft, non-tender, non- distended   Extremities: positive pulses bilaterally x4      Consentable: [x ] Yes   [ ] No     Plan:   -80y Female presents for port removal  -Risks/Benefits/alternatives explained with the patient and/or healthcare proxy and witnessed informed consent obtained.    Vascular & Interventional Radiology Pre-Procedure Note    Procedure Name: image guided Port removal with sedation/anesthesia    Allergies: No Known Allergies      Exam  General: NAD, AAO x3, no distress  Chest: breathing comfortably on room air, CTAB  Abdomen: soft, non-tender, non- distended   Extremities: positive pulses bilaterally x4      Consentable: [x ] Yes   [ ] No     Plan:   -80y Female presents for image guided Port removal with sedation/anesthesia  -Risks/Benefits/alternatives explained with the patient and/or healthcare proxy and witnessed informed consent obtained.

## 2025-05-24 DIAGNOSIS — Z85.038 PERSONAL HISTORY OF OTHER MALIGNANT NEOPLASM OF LARGE INTESTINE: ICD-10-CM

## 2025-05-24 DIAGNOSIS — Z45.2 ENCOUNTER FOR ADJUSTMENT AND MANAGEMENT OF VASCULAR ACCESS DEVICE: ICD-10-CM

## 2025-06-30 NOTE — ED ADULT NURSE NOTE - AS PAIN REST
Requested Prescriptions     Pending Prescriptions Disp Refills    amphetamine-dextroamphetamine (ADDERALL XR) 15 MG extended release capsule 30 capsule 0     Sig: Take 1 capsule by mouth daily for 30 days. Max Daily Amount: 15 mg    venlafaxine (EFFEXOR XR) 150 MG extended release capsule 90 capsule 3     Sig: Take 1 capsule by mouth daily   Patient requesting a medication refill.  Pharmacy: [facility]  Next office visit: Visit date not found  Last regular office visit: 3/24/2025   5 (moderate pain)